# Patient Record
Sex: MALE | Race: OTHER | NOT HISPANIC OR LATINO
[De-identification: names, ages, dates, MRNs, and addresses within clinical notes are randomized per-mention and may not be internally consistent; named-entity substitution may affect disease eponyms.]

---

## 2022-11-29 PROBLEM — Z00.00 ENCOUNTER FOR PREVENTIVE HEALTH EXAMINATION: Status: ACTIVE | Noted: 2022-11-29

## 2022-12-08 PROBLEM — Z87.891 FORMER SMOKER: Status: ACTIVE | Noted: 2022-12-08

## 2022-12-08 PROBLEM — Z78.9 SOCIAL ALCOHOL USE: Status: ACTIVE | Noted: 2022-12-08

## 2022-12-09 ENCOUNTER — APPOINTMENT (OUTPATIENT)
Dept: CARDIOTHORACIC SURGERY | Facility: CLINIC | Age: 72
End: 2022-12-09

## 2022-12-09 VITALS
HEIGHT: 71 IN | BODY MASS INDEX: 34.86 KG/M2 | SYSTOLIC BLOOD PRESSURE: 110 MMHG | TEMPERATURE: 98 F | WEIGHT: 249 LBS | HEART RATE: 81 BPM | DIASTOLIC BLOOD PRESSURE: 63 MMHG | OXYGEN SATURATION: 95 %

## 2022-12-09 DIAGNOSIS — Z83.49 FAMILY HISTORY OF OTHER ENDOCRINE, NUTRITIONAL AND METABOLIC DISEASES: ICD-10-CM

## 2022-12-09 DIAGNOSIS — Z86.79 PERSONAL HISTORY OF OTHER DISEASES OF THE CIRCULATORY SYSTEM: ICD-10-CM

## 2022-12-09 DIAGNOSIS — Z78.9 OTHER SPECIFIED HEALTH STATUS: ICD-10-CM

## 2022-12-09 DIAGNOSIS — Z82.61 FAMILY HISTORY OF ARTHRITIS: ICD-10-CM

## 2022-12-09 DIAGNOSIS — Z80.9 FAMILY HISTORY OF MALIGNANT NEOPLASM, UNSPECIFIED: ICD-10-CM

## 2022-12-09 DIAGNOSIS — Z83.511 FAMILY HISTORY OF GLAUCOMA: ICD-10-CM

## 2022-12-09 DIAGNOSIS — Z87.891 PERSONAL HISTORY OF NICOTINE DEPENDENCE: ICD-10-CM

## 2022-12-09 PROCEDURE — 99205 OFFICE O/P NEW HI 60 MIN: CPT

## 2022-12-12 PROBLEM — Z86.79 HISTORY OF HYPERTENSION: Status: RESOLVED | Noted: 2022-12-12 | Resolved: 2022-12-12

## 2022-12-12 PROBLEM — Z80.9 FAMILY HISTORY OF MALIGNANT NEOPLASM: Status: ACTIVE | Noted: 2022-12-12

## 2022-12-12 PROBLEM — Z82.61 FAMILY HISTORY OF ARTHRITIS: Status: ACTIVE | Noted: 2022-12-12

## 2022-12-12 PROBLEM — Z83.511 FAMILY HISTORY OF GLAUCOMA: Status: ACTIVE | Noted: 2022-12-12

## 2022-12-12 PROBLEM — Z83.49 FAMILY HISTORY OF THYROID DISEASE: Status: ACTIVE | Noted: 2022-12-12

## 2022-12-14 NOTE — HISTORY OF PRESENT ILLNESS
[FreeTextEntry1] : 72 year old male, former smoker ( 25 years 1 ppd, quit 43 years ago) with a past medical history of hypertension, renal calculi, gout, hemochromatosis (managed by Dr.Jeffrey Gomez), atrial fibrillation (on Coumadin), nonischemic cardiomyopathy and severe mitral regurgitation who presents for evaluation and management of his valvular disease. Patient is currently under the care of Dr. Napoleon Corbett. \par \par Patient is doing well and denies recent hospitalization, ER visits, or surgeries. He  denies fever, chills, fatigue, headache, blurred vision, dizziness, syncope, chest pain, palpitations, shortness of breath, orthopnea, paroxysmal nocturnal dyspnea, nausea, vomiting, abdominal pain, back pain, BRBPR or swelling to legs. He is active and typically walks up to 3 miles a day. NYHA II.\par \par Of note he is retired, lives with wife and is independent of all ADLs \par \par

## 2022-12-14 NOTE — END OF VISIT
[FreeTextEntry3] : I, TENNILLE ROBLERO , am scribing for and in the presence of CHUCK GONZALEZ the following sections: History of present illness, past Medical/family/surgical/family/social history, review of systems, vital signs, physical exam and disposition.\par

## 2022-12-14 NOTE — ASSESSMENT
[FreeTextEntry1] : 72 year old male, former smoker ( 25 years 1 ppd, quit 43 years ago) with a past medical history of hypertension, renal calculi, gout, hemochromatosis (managed by Dr.Jeffrey Gomez), atrial fibrillation (on Coumadin), nonischemic cardiomyopathy and severe mitral regurgitation who presents for evaluation and management of his valvular disease. Patient is currently under the care of Dr. Napoleon Corbett. \par \par \par Dr. Atkins reviewed the echocardiogram images with the and discussed the case with Dr. Corbett and Dr. Larkin.. Dr. Larkin discussed the risks, benefits and alternatives to surgery and the various surgical approaches, minimally invasive versus sternotomy. Risks include but not limited to death, heart attack, bleeding, stroke, kidney problems and infection. Dr. Atkins quoted a 2% operative mortality and complication risk. Dr. Atkins feels the patient will benefit from and is a candidate for a Mitral valve repair, possible replacement via sternotomy. CHERI occlusion and CryoMaze Ablation. All questions were addressed and the patient agrees to proceed with surgery. \par  \par Plan \par Mitral valve repair/replacement via sternotomy and CHERI occlusion with atriclip, CyroMaze Ablation \par Stop Coumadin and bridge with Lovenox \par Covid test in [facility] 72 hrs prior to admission\par Admit 1 day before for IV heparin and PST (LABS, UA, EKG, PFTs, chest x-ray PA/LAT) \par Will call patient to plan DOS \par \par

## 2022-12-14 NOTE — DATA REVIEWED
[FreeTextEntry1] : CARLY 11/14/22: \par EF 50-55% \par Severely dilated left atrium \par Severe MR \par Mild TR \par Mitral regurgitation jet is towards the septum \par No evidence of left atrial or CHERI thrombus \par Originates through the posterior leaflet \par Prolapse of the posterior Mitral leaflet \par Systolic flow reversal in the pulmonary veins, which is consistent with severe MR

## 2022-12-14 NOTE — PHYSICAL EXAM
[General Appearance - Alert] : alert [General Appearance - In No Acute Distress] : in no acute distress [Sclera] : the sclera and conjunctiva were normal [PERRL With Normal Accommodation] : pupils were equal in size, round, and reactive to light [Extraocular Movements] : extraocular movements were intact [Outer Ear] : the ears and nose were normal in appearance [Oropharynx] : the oropharynx was normal [Auscultation Breath Sounds / Voice Sounds] : lungs were clear to auscultation bilaterally [Heart Rate And Rhythm] : heart rate was normal and rhythm regular [Heart Sounds] : normal S1 and S2 [Heart Sounds Gallop] : no gallops [Systolic grade ___/6] : A grade [unfilled]/6 systolic murmur was heard. [Examination Of The Chest] : the chest was normal in appearance [2+] : left 2+ [No Abnormalities] : the abdominal aorta was not enlarged and no bruit was heard [Breast Appearance] : normal in appearance [Bowel Sounds] : normal bowel sounds [Abdomen Soft] : soft [Abdomen Tenderness] : non-tender [No CVA Tenderness] : no ~M costovertebral angle tenderness [Abnormal Walk] : normal gait [Nail Clubbing] : no clubbing  or cyanosis of the fingernails [Musculoskeletal - Swelling] : no joint swelling seen [Skin Color & Pigmentation] : normal skin color and pigmentation [Skin Turgor] : normal skin turgor [] : no rash [Cranial Nerves] : cranial nerves 2-12 were intact [Deep Tendon Reflexes (DTR)] : deep tendon reflexes were 2+ and symmetric [Sensation] : the sensory exam was normal to light touch and pinprick [Oriented To Time, Place, And Person] : oriented to person, place, and time [Impaired Insight] : insight and judgment were intact [Affect] : the affect was normal [Varicose Veins Of The Right Leg] : the patient has no varicose veins of the right leg [Varicose Veins Of The Left Leg] : the patient has no varicose veins of the left leg

## 2022-12-19 ENCOUNTER — APPOINTMENT (OUTPATIENT)
Dept: CARDIOTHORACIC SURGERY | Facility: CLINIC | Age: 72
End: 2022-12-19

## 2022-12-19 ENCOUNTER — NON-APPOINTMENT (OUTPATIENT)
Age: 72
End: 2022-12-19

## 2022-12-19 DIAGNOSIS — I34.0 NONRHEUMATIC MITRAL (VALVE) INSUFFICIENCY: ICD-10-CM

## 2022-12-19 PROCEDURE — 99441: CPT

## 2022-12-21 PROBLEM — I34.0 SEVERE MITRAL REGURGITATION: Status: ACTIVE | Noted: 2022-12-08

## 2023-01-03 DIAGNOSIS — Z87.19 PERSONAL HISTORY OF OTHER DISEASES OF THE DIGESTIVE SYSTEM: ICD-10-CM

## 2023-01-06 ENCOUNTER — RESULT REVIEW (OUTPATIENT)
Age: 73
End: 2023-01-06

## 2023-01-09 ENCOUNTER — INPATIENT (INPATIENT)
Facility: HOSPITAL | Age: 73
LOS: 8 days | Discharge: HOME HEALTH | DRG: 216 | End: 2023-01-18
Attending: THORACIC SURGERY (CARDIOTHORACIC VASCULAR SURGERY) | Admitting: THORACIC SURGERY (CARDIOTHORACIC VASCULAR SURGERY)
Payer: MEDICARE

## 2023-01-09 ENCOUNTER — TRANSCRIPTION ENCOUNTER (OUTPATIENT)
Age: 73
End: 2023-01-09

## 2023-01-09 VITALS
SYSTOLIC BLOOD PRESSURE: 118 MMHG | DIASTOLIC BLOOD PRESSURE: 77 MMHG | TEMPERATURE: 97 F | RESPIRATION RATE: 16 BRPM | HEART RATE: 88 BPM | OXYGEN SATURATION: 96 %

## 2023-01-09 DIAGNOSIS — Z90.49 ACQUIRED ABSENCE OF OTHER SPECIFIED PARTS OF DIGESTIVE TRACT: Chronic | ICD-10-CM

## 2023-01-09 LAB
A1C WITH ESTIMATED AVERAGE GLUCOSE RESULT: 5.6 % — SIGNIFICANT CHANGE UP (ref 4–5.6)
ALBUMIN SERPL ELPH-MCNC: 4 G/DL — SIGNIFICANT CHANGE UP (ref 3.3–5)
ALP SERPL-CCNC: 49 U/L — SIGNIFICANT CHANGE UP (ref 40–120)
ALT FLD-CCNC: 34 U/L — SIGNIFICANT CHANGE UP (ref 10–45)
ANION GAP SERPL CALC-SCNC: 8 MMOL/L — SIGNIFICANT CHANGE UP (ref 5–17)
APPEARANCE UR: CLEAR — SIGNIFICANT CHANGE UP
APTT BLD: 33.6 SEC — SIGNIFICANT CHANGE UP (ref 27.5–35.5)
AST SERPL-CCNC: 39 U/L — SIGNIFICANT CHANGE UP (ref 10–40)
BASOPHILS # BLD AUTO: 0.04 K/UL — SIGNIFICANT CHANGE UP (ref 0–0.2)
BASOPHILS NFR BLD AUTO: 0.6 % — SIGNIFICANT CHANGE UP (ref 0–2)
BILIRUB SERPL-MCNC: 1.2 MG/DL — SIGNIFICANT CHANGE UP (ref 0.2–1.2)
BILIRUB UR-MCNC: NEGATIVE — SIGNIFICANT CHANGE UP
BLD GP AB SCN SERPL QL: NEGATIVE — SIGNIFICANT CHANGE UP
BUN SERPL-MCNC: 37 MG/DL — HIGH (ref 7–23)
CALCIUM SERPL-MCNC: 8.9 MG/DL — SIGNIFICANT CHANGE UP (ref 8.4–10.5)
CHLORIDE SERPL-SCNC: 105 MMOL/L — SIGNIFICANT CHANGE UP (ref 96–108)
CHOLEST SERPL-MCNC: 186 MG/DL — SIGNIFICANT CHANGE UP
CO2 SERPL-SCNC: 27 MMOL/L — SIGNIFICANT CHANGE UP (ref 22–31)
COLOR SPEC: YELLOW — SIGNIFICANT CHANGE UP
CREAT SERPL-MCNC: 1.11 MG/DL — SIGNIFICANT CHANGE UP (ref 0.5–1.3)
DIFF PNL FLD: NEGATIVE — SIGNIFICANT CHANGE UP
EGFR: 71 ML/MIN/1.73M2 — SIGNIFICANT CHANGE UP
EOSINOPHIL # BLD AUTO: 0.16 K/UL — SIGNIFICANT CHANGE UP (ref 0–0.5)
EOSINOPHIL NFR BLD AUTO: 2.3 % — SIGNIFICANT CHANGE UP (ref 0–6)
ESTIMATED AVERAGE GLUCOSE: 114 MG/DL — SIGNIFICANT CHANGE UP (ref 68–114)
GLUCOSE SERPL-MCNC: 112 MG/DL — HIGH (ref 70–99)
GLUCOSE UR QL: NEGATIVE — SIGNIFICANT CHANGE UP
HCT VFR BLD CALC: 48.1 % — SIGNIFICANT CHANGE UP (ref 39–50)
HDLC SERPL-MCNC: 44 MG/DL — SIGNIFICANT CHANGE UP
HGB BLD-MCNC: 16.4 G/DL — SIGNIFICANT CHANGE UP (ref 13–17)
IMM GRANULOCYTES NFR BLD AUTO: 0.3 % — SIGNIFICANT CHANGE UP (ref 0–0.9)
INR BLD: 1.14 — SIGNIFICANT CHANGE UP (ref 0.88–1.16)
ISTAT ACTK (ACTIVATED CLOTTING TIME KAOLIN): 245 SEC — HIGH (ref 74–137)
KETONES UR-MCNC: NEGATIVE — SIGNIFICANT CHANGE UP
LEUKOCYTE ESTERASE UR-ACNC: NEGATIVE — SIGNIFICANT CHANGE UP
LIPID PNL WITH DIRECT LDL SERPL: 122 MG/DL — HIGH
LYMPHOCYTES # BLD AUTO: 1.89 K/UL — SIGNIFICANT CHANGE UP (ref 1–3.3)
LYMPHOCYTES # BLD AUTO: 27.8 % — SIGNIFICANT CHANGE UP (ref 13–44)
MCHC RBC-ENTMCNC: 31.1 PG — SIGNIFICANT CHANGE UP (ref 27–34)
MCHC RBC-ENTMCNC: 34.1 GM/DL — SIGNIFICANT CHANGE UP (ref 32–36)
MCV RBC AUTO: 91.1 FL — SIGNIFICANT CHANGE UP (ref 80–100)
MONOCYTES # BLD AUTO: 0.67 K/UL — SIGNIFICANT CHANGE UP (ref 0–0.9)
MONOCYTES NFR BLD AUTO: 9.8 % — SIGNIFICANT CHANGE UP (ref 2–14)
NEUTROPHILS # BLD AUTO: 4.03 K/UL — SIGNIFICANT CHANGE UP (ref 1.8–7.4)
NEUTROPHILS NFR BLD AUTO: 59.2 % — SIGNIFICANT CHANGE UP (ref 43–77)
NITRITE UR-MCNC: NEGATIVE — SIGNIFICANT CHANGE UP
NON HDL CHOLESTEROL: 142 MG/DL — HIGH
NRBC # BLD: 0 /100 WBCS — SIGNIFICANT CHANGE UP (ref 0–0)
NT-PROBNP SERPL-SCNC: 906 PG/ML — HIGH (ref 0–300)
PH UR: 6 — SIGNIFICANT CHANGE UP (ref 5–8)
PLATELET # BLD AUTO: 186 K/UL — SIGNIFICANT CHANGE UP (ref 150–400)
POTASSIUM SERPL-MCNC: 4.5 MMOL/L — SIGNIFICANT CHANGE UP (ref 3.5–5.3)
POTASSIUM SERPL-SCNC: 4.5 MMOL/L — SIGNIFICANT CHANGE UP (ref 3.5–5.3)
PROT SERPL-MCNC: 7.2 G/DL — SIGNIFICANT CHANGE UP (ref 6–8.3)
PROT UR-MCNC: NEGATIVE MG/DL — SIGNIFICANT CHANGE UP
PROTHROM AB SERPL-ACNC: 13.6 SEC — HIGH (ref 10.5–13.4)
RBC # BLD: 5.28 M/UL — SIGNIFICANT CHANGE UP (ref 4.2–5.8)
RBC # FLD: 13.8 % — SIGNIFICANT CHANGE UP (ref 10.3–14.5)
RH IG SCN BLD-IMP: POSITIVE — SIGNIFICANT CHANGE UP
RH IG SCN BLD-IMP: POSITIVE — SIGNIFICANT CHANGE UP
SARS-COV-2 RNA SPEC QL NAA+PROBE: SIGNIFICANT CHANGE UP
SODIUM SERPL-SCNC: 140 MMOL/L — SIGNIFICANT CHANGE UP (ref 135–145)
SP GR SPEC: 1.01 — SIGNIFICANT CHANGE UP (ref 1–1.03)
TRIGL SERPL-MCNC: 98 MG/DL — SIGNIFICANT CHANGE UP
TSH SERPL-MCNC: 2.42 UIU/ML — SIGNIFICANT CHANGE UP (ref 0.27–4.2)
UROBILINOGEN FLD QL: 1 E.U./DL — SIGNIFICANT CHANGE UP
WBC # BLD: 6.81 K/UL — SIGNIFICANT CHANGE UP (ref 3.8–10.5)
WBC # FLD AUTO: 6.81 K/UL — SIGNIFICANT CHANGE UP (ref 3.8–10.5)

## 2023-01-09 PROCEDURE — 99221 1ST HOSP IP/OBS SF/LOW 40: CPT

## 2023-01-09 PROCEDURE — 99152 MOD SED SAME PHYS/QHP 5/>YRS: CPT

## 2023-01-09 PROCEDURE — 93880 EXTRACRANIAL BILAT STUDY: CPT | Mod: 26

## 2023-01-09 PROCEDURE — 94010 BREATHING CAPACITY TEST: CPT | Mod: 26

## 2023-01-09 PROCEDURE — 93454 CORONARY ARTERY ANGIO S&I: CPT | Mod: 26

## 2023-01-09 PROCEDURE — 71046 X-RAY EXAM CHEST 2 VIEWS: CPT | Mod: 26

## 2023-01-09 RX ORDER — HEPARIN SODIUM 5000 [USP'U]/ML
1300 INJECTION INTRAVENOUS; SUBCUTANEOUS
Qty: 25000 | Refills: 0 | Status: DISCONTINUED | OUTPATIENT
Start: 2023-01-09 | End: 2023-01-10

## 2023-01-09 RX ORDER — CHLORHEXIDINE GLUCONATE 213 G/1000ML
1 SOLUTION TOPICAL ONCE
Refills: 0 | Status: DISCONTINUED | OUTPATIENT
Start: 2023-01-09 | End: 2023-01-10

## 2023-01-09 RX ORDER — SODIUM CHLORIDE 9 MG/ML
500 INJECTION INTRAMUSCULAR; INTRAVENOUS; SUBCUTANEOUS
Refills: 0 | Status: DISCONTINUED | OUTPATIENT
Start: 2023-01-09 | End: 2023-01-09

## 2023-01-09 RX ORDER — CHLORHEXIDINE GLUCONATE 213 G/1000ML
1 SOLUTION TOPICAL ONCE
Refills: 0 | Status: COMPLETED | OUTPATIENT
Start: 2023-01-09 | End: 2023-01-09

## 2023-01-09 RX ORDER — CARVEDILOL PHOSPHATE 80 MG/1
25 CAPSULE, EXTENDED RELEASE ORAL EVERY 12 HOURS
Refills: 0 | Status: DISCONTINUED | OUTPATIENT
Start: 2023-01-09 | End: 2023-01-10

## 2023-01-09 RX ORDER — CHLORHEXIDINE GLUCONATE 213 G/1000ML
15 SOLUTION TOPICAL ONCE
Refills: 0 | Status: COMPLETED | OUTPATIENT
Start: 2023-01-09 | End: 2023-01-10

## 2023-01-09 RX ORDER — SODIUM CHLORIDE 9 MG/ML
3 INJECTION INTRAMUSCULAR; INTRAVENOUS; SUBCUTANEOUS ONCE
Refills: 0 | Status: DISCONTINUED | OUTPATIENT
Start: 2023-01-09 | End: 2023-01-10

## 2023-01-09 RX ORDER — POLYETHYLENE GLYCOL 3350 17 G/17G
17 POWDER, FOR SOLUTION ORAL DAILY
Refills: 0 | Status: DISCONTINUED | OUTPATIENT
Start: 2023-01-09 | End: 2023-01-10

## 2023-01-09 RX ORDER — SENNA PLUS 8.6 MG/1
2 TABLET ORAL AT BEDTIME
Refills: 0 | Status: DISCONTINUED | OUTPATIENT
Start: 2023-01-09 | End: 2023-01-10

## 2023-01-09 RX ORDER — PANTOPRAZOLE SODIUM 20 MG/1
40 TABLET, DELAYED RELEASE ORAL
Refills: 0 | Status: DISCONTINUED | OUTPATIENT
Start: 2023-01-09 | End: 2023-01-10

## 2023-01-09 RX ORDER — CHLORHEXIDINE GLUCONATE 213 G/1000ML
1 SOLUTION TOPICAL ONCE
Refills: 0 | Status: COMPLETED | OUTPATIENT
Start: 2023-01-10 | End: 2023-01-10

## 2023-01-09 RX ADMIN — SODIUM CHLORIDE 30 MILLILITER(S): 9 INJECTION INTRAMUSCULAR; INTRAVENOUS; SUBCUTANEOUS at 08:24

## 2023-01-09 RX ADMIN — SENNA PLUS 2 TABLET(S): 8.6 TABLET ORAL at 23:00

## 2023-01-09 RX ADMIN — CARVEDILOL PHOSPHATE 25 MILLIGRAM(S): 80 CAPSULE, EXTENDED RELEASE ORAL at 20:08

## 2023-01-09 RX ADMIN — CHLORHEXIDINE GLUCONATE 1 APPLICATION(S): 213 SOLUTION TOPICAL at 22:59

## 2023-01-09 RX ADMIN — HEPARIN SODIUM 13 UNIT(S)/HR: 5000 INJECTION INTRAVENOUS; SUBCUTANEOUS at 18:55

## 2023-01-09 NOTE — CONSULT NOTE ADULT - SUBJECTIVE AND OBJECTIVE BOX
Surgeon: Dr. Atkins    HISTORY OF PRESENT ILLNESS:  72 year old male, former smoker (1 ppd, quit 43 years ago) with a past medical history of hypertension, renal calculi, gout, hemochromatosis (managed by Dr.Jeffrey Gomez), atrial fibrillation (on Coumadin, last dose 1/3/23), nonischemic cardiomyopathy and severe mitral regurgitation who presents for evaluation and management of his valvular disease. Patient is currently under the care of Dr. Napoleon Corbett. Patient arrived to [facility] for preop cardiac cath 1/9/22 revealing coronaries with mild LI only and known severe MR. Admitted to CTS under Dr. Atkins for presurgical testing for planned MVR, CHERI occlusion, cryomaze ablation with Dr. Atkins and Dr. Larkin tomorrow. Denies fever, chills, fatigue, headache, blurred vision, dizziness, syncope, chest pain, palpitations, shortness of breath, orthopnea, paroxysmal nocturnal dyspnea, nausea, vomiting, abdominal pain, back pain, BRBPR or swelling to legs.       PAST MEDICAL & SURGICAL HISTORY:  Hypertension      Renal calculi      Gout      History of hemochromatosis      Nonischemic cardiomyopathy      Mitral regurgitation      History of cholecystectomy          MEDICATIONS  (STANDING):  chlorhexidine 0.12% Liquid 15 milliLiter(s) Swish and Spit once  chlorhexidine 4% Liquid 1 Application(s) Topical Once  chlorhexidine 4% Liquid 1 Application(s) Topical once  chlorhexidine 4% Liquid 1 Application(s) Topical once  sodium chloride 0.9% lock flush 3 milliLiter(s) IV Push Once    MEDICATIONS  (PRN):      Allergies    No Known Allergies    Intolerances        SOCIAL HISTORY:  Smoker:  former smoker, 1 ppd, quit 43 years ago  ETOH use:  social dirnker  Ilicit Drug use:  No  Occupation: retired  Assisted device use (Cane / Walker): none  Live with: wife    FAMILY HISTORY:  FH: arthritis (Mother)    FH: glaucoma (Sibling)    FH: hypothyroidism (Sibling)        Review of Systems:  CONSTITUTIONAL: Denies fevers / chills, sweats, fatigue, weight loss, weight gain                                       NEURO:  Denies parathesias, seizures, syncope, confusion                                                                                  EYES:  Denies blurry vision, discharge, pain, loss of vision                                                                                    ENMT:  Denies difficulty hearing, vertigo, dysphagia, epistaxis, recent dental work                                       CV:  Denies chest pain, palpitations, ASHFORD, orthopnea                                                                                           RESPIRATORY:  Denies wWheezing, SOB, cough / sputum, hemoptysis                                                               GI:  Denies nausea, vomiting, diarrhea, constipation, melena                                                                          : Denies hematuria, dysuria, urgency, incontinence                                                                                          MUSKULOSKELETAL:  Denies arthritis, joint swelling, muscle weakness                                                             SKIN/BREAST:  Denies rash, itching, hair loss, masses                                                                                              PSYCH:  Denies depression, anxiety, suicidal ideation                                                                                                HEME/LYMPH:  Denies bruises easily, enlarged lymph nodes, tender lymph nodes                                          ENDOCRINE:  Denies cold intolerance, heat intolerance, polydipsia                                                                      Vital Signs Last 24 Hrs  T(C): 36.2 (09 Jan 2023 07:38), Max: 36.2 (09 Jan 2023 07:38)  T(F): 97.2 (09 Jan 2023 07:38), Max: 97.2 (09 Jan 2023 07:38)  HR: 88 (09 Jan 2023 07:38) (88 - 88)  BP: 118/77 (09 Jan 2023 07:38) (118/77 - 118/77)  BP(mean): 90 (09 Jan 2023 07:38) (90 - 90)  RR: 16 (09 Jan 2023 07:38) (16 - 16)  SpO2: 96% (09 Jan 2023 07:38) (96% - 96%)    Parameters below as of 09 Jan 2023 07:38  Patient On (Oxygen Delivery Method): room air        Physical Exam (Need 8)  CONSTITUTIONAL:                                                                          WNL  NEURO:                                                                                             WNL                      EYES:                                                                                                  WNL  ENMT:                                                                                                WNL  CV:                                                                                                      WNL  RESPIRATORY:                                                                                  WNL  GI:                                                                                                       WNL  : CABEZAS + / -                                                                                 WNL  MUSKULOSKELETAL:                                                                       WNL  SKIN / BREAST:                                                                                 WNL                                                          LABS:                        16.4   6.81  )-----------( 186      ( 09 Jan 2023 07:39 )             48.1     01-09    140  |  105  |  37<H>  ----------------------------<  112<H>  4.5   |  27  |  1.11    Ca    8.9      09 Jan 2023 07:37    TPro  7.2  /  Alb  4.0  /  TBili  1.2  /  DBili  x   /  AST  39  /  ALT  34  /  AlkPhos  49  01-09    PT/INR - ( 09 Jan 2023 07:39 )   PT: 13.6 sec;   INR: 1.14          PTT - ( 09 Jan 2023 07:39 )  PTT:33.6 sec            RADIOLOGY & ADDITIONAL STUDIES:  CAROTID U/S:    CXR:    CT Scan:    EKG:    TTE / CARLY:    Cardiac Cath: Surgeon: Dr. Atkins    HISTORY OF PRESENT ILLNESS:  72 year old male, former smoker (1 ppd, quit 43 years ago) with a past medical history of hypertension, renal calculi, gout, hemochromatosis (managed by Dr.Jeffrey Gomez), atrial fibrillation (on Coumadin, last dose 1/3/23), nonischemic cardiomyopathy and severe mitral regurgitation who presents for evaluation and management of his valvular disease. Patient is currently under the care of Dr. Napoleon Corbett. Patient arrived to [facility] for preop cardiac cath 1/9/22 revealing coronaries with mild LI only and known severe MR. Admitted to CTS under Dr. Atkins for presurgical testing for planned MVR, CHERI occlusion, cryomaze ablation with Dr. Atkins and Dr. Larkin tomorrow, 1/10/22. Denies fever, chills, fatigue, headache, blurred vision, dizziness, syncope, chest pain, palpitations, shortness of breath, orthopnea, paroxysmal nocturnal dyspnea, nausea, vomiting, abdominal pain, back pain, BRBPR or swelling to legs.       PAST MEDICAL & SURGICAL HISTORY:  Hypertension      Renal calculi      Gout      History of hemochromatosis      Nonischemic cardiomyopathy      Mitral regurgitation      History of cholecystectomy          MEDICATIONS  (STANDING):  chlorhexidine 0.12% Liquid 15 milliLiter(s) Swish and Spit once  chlorhexidine 4% Liquid 1 Application(s) Topical Once  chlorhexidine 4% Liquid 1 Application(s) Topical once  chlorhexidine 4% Liquid 1 Application(s) Topical once  sodium chloride 0.9% lock flush 3 milliLiter(s) IV Push Once    MEDICATIONS  (PRN):      Allergies    No Known Allergies    Intolerances        SOCIAL HISTORY:  Smoker:  former smoker, 1 ppd, quit 43 years ago  ETOH use:  social dirnker  Ilicit Drug use:  No  Occupation: retired  Assisted device use (Cane / Walker): none  Live with: wife    FAMILY HISTORY:  FH: arthritis (Mother)    FH: glaucoma (Sibling)    FH: hypothyroidism (Sibling)        Review of Systems:  CONSTITUTIONAL: Denies fevers / chills, sweats, fatigue, weight loss, weight gain                                       NEURO:  Denies parathesias, seizures, syncope, confusion                                                                                  EYES:  Denies blurry vision, discharge, pain, loss of vision                                                                                    ENMT:  Denies difficulty hearing, vertigo, dysphagia, epistaxis, recent dental work                                       CV:  Denies chest pain, palpitations, ASHFORD, orthopnea                                                                                           RESPIRATORY:  Denies wWheezing, SOB, cough / sputum, hemoptysis                                                               GI:  Denies nausea, vomiting, diarrhea, constipation, melena                                                                          : Denies hematuria, dysuria, urgency, incontinence                                                                                          MUSKULOSKELETAL:  Denies arthritis, joint swelling, muscle weakness                                                             SKIN/BREAST:  Denies rash, itching, hair loss, masses                                                                                              PSYCH:  Denies depression, anxiety, suicidal ideation                                                                                                HEME/LYMPH:  Denies bruises easily, enlarged lymph nodes, tender lymph nodes                                          ENDOCRINE:  Denies cold intolerance, heat intolerance, polydipsia                                                                      Vital Signs Last 24 Hrs  T(C): 36.2 (09 Jan 2023 07:38), Max: 36.2 (09 Jan 2023 07:38)  T(F): 97.2 (09 Jan 2023 07:38), Max: 97.2 (09 Jan 2023 07:38)  HR: 88 (09 Jan 2023 07:38) (88 - 88)  BP: 118/77 (09 Jan 2023 07:38) (118/77 - 118/77)  BP(mean): 90 (09 Jan 2023 07:38) (90 - 90)  RR: 16 (09 Jan 2023 07:38) (16 - 16)  SpO2: 96% (09 Jan 2023 07:38) (96% - 96%)    Parameters below as of 09 Jan 2023 07:38  Patient On (Oxygen Delivery Method): room air        Physical Exam  General: NAD, sitting comfortably in stretcher, conversing appropriately  Head; NC/AT  Neurological: alert and oriented, UE and LE strength equal b/l, facial symmetry present  Cardiovascular: RRR, Clear S1 and S2, +systolic murmur  Respiratory: chest expansion symmetrical, CTA b/l, no wheezing noted  Gastrointestinal: +BS, soft, NT, ND  Extremities: moving spontaneously, no calf tenderness or edema.  Vascular: warm, well perfused. DP/PT pulses palpable b/l.  Skin: without obvious rashes  Incisions: b/l radial bands in place    LABS:                        16.4   6.81  )-----------( 186      ( 09 Jan 2023 07:39 )             48.1     01-09    140  |  105  |  37<H>  ----------------------------<  112<H>  4.5   |  27  |  1.11    Ca    8.9      09 Jan 2023 07:37    TPro  7.2  /  Alb  4.0  /  TBili  1.2  /  DBili  x   /  AST  39  /  ALT  34  /  AlkPhos  49  01-09    PT/INR - ( 09 Jan 2023 07:39 )   PT: 13.6 sec;   INR: 1.14          PTT - ( 09 Jan 2023 07:39 )  PTT:33.6 sec            RADIOLOGY & ADDITIONAL STUDIES:  CAROTID U/S:  pending    CXR:  pending    CT Scan:  not indicated at this time    EKG:  in chart    TTE / CARLY:  CARLY 11/14/22:  EF 50-55%, severe MVR, Mild TR, prolapse of posterior mitral leaflet    TTE 11/9/22:  EF 55% without wall motion abnormalities, left atrium severely dilated    Cardiac Cath:  CATH 1/09/23: RRA attenpted - tortous/aborted, LLA accessed - has R and L TR bands to be removed at noon by cards PA  coronaries with mild LI only  no LVEDP measured, known severe MR

## 2023-01-09 NOTE — H&P ADULT - ASSESSMENT
71 y/o M (former smoker), with PMHx of HTN,  atrial fibrillation (on coumadin - last dose: __   ), renal calculi, gout, hemochromatosis (managed by Dr. Tenzin Gomez), nonischemic cardiomyopathy (EF: 50-55% as per echo below), and severe mitral regurgitation, who now presents for cardiac catheterization to r/o ischemic disease and will be admitted post procedure to [facility] to undergo surgery  (mitral valve repair/replacement via sternotomy and CHERI occlusion with atrialclip/cryomaze ablation) ,with Dr. Atkins.           H/H    . Pt denies bleeding, GI bleeding, hematemesis, hematuria, BRBPR or melena . Pt to be loaded only with  mg PO x 1 as pt. is pre-op  Cr.    IV NS@ 30 cc/hr X 2  pre-cath only 2/2 severe MR.    ASA 3   Mallampati 3    Risks & benefits of procedure and alternative therapy have been explained to the patient including but not limited to: allergic reaction, bleeding w/possible need for blood transfusion, infection, renal and vascular compromise, limb damage, arrhythmia, stroke, vessel dissection/perforation, Myocardial infarction, emergent CABG. Informed consent obtained and in chart   71 y/o M (former smoker), with PMHx of HTN,  atrial fibrillation (on coumadin - last dose ), renal calculi, gout, hemochromatosis (managed by Dr. Tenzin Gomez), nonischemic cardiomyopathy (EF: 50-55% as per echo below), and severe mitral regurgitation, who now presents for cardiac catheterization to r/o ischemic disease and will be admitted post procedure to [facility] to undergo surgery  (mitral valve repair/replacement via sternotomy and CHERI occlusion with atrialclip/cryomaze ablation) ,with Dr. Atkins.           H/H    . Pt denies bleeding, GI bleeding, hematemesis, hematuria, BRBPR or melena . Pt to be loaded only with  mg PO x 1 as pt. is pre-op  Cr.    IV NS@ 30 cc/hr X 2  pre-cath only 2/2 severe MR.    ASA 3   Mallampati 3    Risks & benefits of procedure and alternative therapy have been explained to the patient including but not limited to: allergic reaction, bleeding w/possible need for blood transfusion, infection, renal and vascular compromise, limb damage, arrhythmia, stroke, vessel dissection/perforation, Myocardial infarction, emergent CABG. Informed consent obtained and in chart

## 2023-01-09 NOTE — PATIENT PROFILE ADULT - FALL HARM RISK - HARM RISK INTERVENTIONS

## 2023-01-09 NOTE — H&P ADULT - HISTORY OF PRESENT ILLNESS
** Patient to be admitted to [facility] post procedure**      Cardiologist: Dr. Atkins / Dr. Corbett (Cards)  Pharmacy:   COVID: 1/6/23 neg (HIE)    coumadin last dose   , lovonex     71 y/o M (former smoker), with PMHx of HTN,  atrial fibrillation (on coumadin - last dose: __   ), renal calculi, gout, hemochromatosis (managed by Dr. Tenzin Gomez), nonischemic cardiomyopathy (EF: 50-55% as per echo below), and severe mitral regurgitation, who presented to Dr. Atkins, for his valvular disease. Patient feels well overall, denies current symptoms. Patient denies CP, SOB, palpitations, orthopnea, LE edema, syncope, n/v, dizziness, diaphoresis, claudication, fever, chills, recent travel, or sick contacts.   ECHO 11/14/2022: Severely dilated LA. Severe MVR. Mild TR. Prolapse of the posterior mitral leaflet. Systolic flow reversal in the pulmonary veins, which is consistent with severe MR. EF: 50-55%.     In light of patient's risk factors, known severe MR, with plan for mitral valve repair/replacement via sternotomy and CHERI occlusion with atrialclip/cryomaze ablation, patient to undergo cardiac catheterization to r/o ischemic disease and will be admitted post procedure to [facility] to undergo surgery with Dr. Atkins.     ** Patient to be admitted to [facility] post procedure**      Cardiologist: Dr. Atkins / Dr. Corbett (Cards)  Pharmacy: [facility] [id number]  COVID: 1/6/23 neg (HIE)    coumadin last dose 1/3/23   bridged to lovonex 1/6-1/8    73 y/o M (former smoker), with PMHx of HTN,  atrial fibrillation (on coumadin), renal calculi, gout, hemochromatosis (managed by Dr. Tenzin Gomez), nonischemic cardiomyopathy (EF: 50-55% as per echo below), and severe mitral regurgitation, who presented to Dr. Atkins, for his valvular disease. Patient feels well overall, denies current symptoms. Patient denies CP, SOB, palpitations, orthopnea, LE edema, syncope, n/v, dizziness, diaphoresis, claudication, fever, chills, recent travel, or sick contacts.  ECHO 11/14/2022: Severely dilated LA. Severe MVR. Mild TR. Prolapse of the posterior mitral leaflet. Systolic flow reversal in the pulmonary veins, which is consistent with severe MR. EF: 50-55%.     In light of patient's risk factors, known severe MR, with plan for mitral valve repair/replacement via sternotomy and CHERI occlusion with atrialclip/cryomaze ablation, patient to undergo cardiac catheterization to r/o ischemic disease and will be admitted post procedure to [facility] to undergo surgery with Dr. Atkins.     (4) rarely moist

## 2023-01-09 NOTE — H&P ADULT - NSHPSOCIALHISTORY_GEN_ALL_CORE
former smoker; quit 43 years ago; smoked 1 PPD x 25 years  social ETOH user  denies any Illicit drug use

## 2023-01-09 NOTE — H&P ADULT - NSICDXPASTMEDICALHX_GEN_ALL_CORE_FT
PAST MEDICAL HISTORY:  Gout     History of hemochromatosis     Hypertension     Mitral regurgitation     Nonischemic cardiomyopathy     Renal calculi

## 2023-01-09 NOTE — H&P ADULT - NSICDXFAMILYHX_GEN_ALL_CORE_FT
FAMILY HISTORY:  Mother  Still living? Unknown  FH: arthritis, Age at diagnosis: Age Unknown    Sibling  Still living? Unknown  FH: glaucoma, Age at diagnosis: Age Unknown  FH: hypothyroidism, Age at diagnosis: Age Unknown

## 2023-01-09 NOTE — H&P ADULT - CONSTITUTIONAL
DATE:  05/03/2019    CONSULTANT:  Bran Cheatham M.D.    REASON FOR CONSULTATION:  Nephrology is consulted for evaluation of renal  failure.     HISTORY OF PRESENT ILLNESS:  The patient is a 65-year-old woman known to our  Nephrology Service from her last hospital admission.  She was discharged home  on April 1, 2019 after being treated for cardiomyopathy and lower extremity  edema, status post acute myocardial infarction.  Cardiac catheterization was  not performed due to renal failure.  Discharge creatinine measured 3.97 mg/dL.  She has been following up in the congestive Heart failure Clinic at [facility].     Yesterday, she presented to the clinic with complaints of dyspnea on exertion  and poor exercise tolerance.  She was also notably jaundiced.  She was admitted  to the hospital.  Creatinine now measures 2.07 mg/dL.  She remains volume  expanded with shortness of breath at rest.  Nephrology is consulted for further  evaluation.     The patient denies any fever or chills.  She had some nausea.  There were no  complaints of cough.  She denies any urinary complaints.  She has significant  lower extremity edema, which makes it difficult for her to ambulate.     PAST MEDICAL HISTORY:  Significant for renal insufficiency, longstanding  diabetes, and hypertension.  She has peripheral edema that was controlled with  diuretics.  Medical history is also significant for acute myocardial infarction  and ischemic cardiomyopathy.  There is no history of stroke.  She is also  treated for hyperlipidemia.     PAST SURGICAL HISTORY:  Significant for coronary artery bypass 10 years ago.  She also has a history of cardiac catheterization with stent placement.     ALLERGIES:  The patient has no known drug allergies.    MEDICATIONS:  Current medications include the following:  Xanax 0.25 mg p.r.n.,  insulin on a sliding scale, hydralazine 25 mg p.o. b.i.d., Coreg 3.125 mg p.o.  q.12 hours, isosorbide mononitrate 30 mg p.o.  daily, potassium chloride 5 mEq  p.o. daily, Tylenol as needed, Plavix 75 mg p.o. daily, aspirin 81 mg p.o.  daily, atorvastatin 80 mg p.o. daily.     FAMILY HISTORY:  Significant for hypertension, diabetes, and coronary artery  disease.     SOCIAL HISTORY:  The patient is single.  She lives at home with one of her  daughters.  She has a total of 3 children.  She does not smoke cigarettes.  She  quit smoking more than 20 years ago.  She does not use alcohol.  She was born  and raised in [place].  She is currently retired.  She worked in [employer]  before residential.  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     PHYSICAL EXAMINATION:  General:  The patient is a chronically ill-appearing,  jaundiced black woman.  She is alert and oriented to person, place, and time.  Vital Signs:  Blood pressure is 109/72, respiratory rate 16, pulse 67,  temperature 36.3 degrees centigrade.  HEENT:  There were no cranial  abnormalities.  There was no conjunctivitis.  Sclerae were icteric.  JVD was  present.  Oral mucosal membranes were moist and intact.  Trachea was in the  midline without stridor.  Pulmonary:  Examination disclosed bibasilar rales and  decreased breath sounds.  Cardiac:  Examination showed a regular rate and  rhythm with a normal S1, S2.  No extra heart sounds were present.  There was no  pericardial friction rub.  There was a grade 2/6 systolic ejection murmur at  the apex that does not radiate.  Abdomen:  Distended.  Ascites was present.  Bowel sounds were present in all 4 quadrants.  There was no tenderness.  Mild  sacral edema was present.  There was 3+ pitting edema peripherally.  Neurologic:  Cranial nerves II through XII were intact.  She was able to move  all 4 extremities.     LABORATORY DATA:  Sodium is 137, potassium 4.2, chloride 103, CO2 22, ,  creatinine 2.07, magnesium 3.1, phosphorus 5.2, bilirubin total 3.2.  INR is  1.2.  Hemoglobin is 11.4 g/dL.  White blood cell count  3.3 thousand per  microliter, platelet count 279,000.     ASSESSMENT AND PLAN:  The patient is a 65-year-old woman readmitted to the  hospital with anasarca and decreased exercise tolerance.  She also appears  jaundiced on examination.  Workup for her jaundice thus far has included an  ultrasound of the gallbladder showing a suggestion of gallstones, however,  there was no evidence of acute cholecystitis.  There was no ductal dilatation.  Chest x-ray is pending.  She appears volume expanded on physical examination,  and is uncomfortable at rest.     Medical problems in this woman include the followin. Stage 3/4 chronic kidney disease.  2. Anasarca.  3. Ischemic cardiomyopathy.  4. Dyspnea on exertion with poor exercise tolerance.  5. Coronary artery disease, status post coronary artery bypass.  6. Ischemic cardiomyopathy, status post pacemaker and defibrillator placement.  7. History of hypertension.  8. Anasarca.  9. Recent acute myocardial infarction.  10. Chronic anemia.  11. Hyperlipidemia.   The patient will be treated with a furosemide drip at a rate of 40 mg/hour as  well as Milrinone as a means of mobilizing fluid and improving cardiac output.  Her last echocardiogram performed on 2019 showed an ejection fraction  measuring between 10% and 15%.  Right ventricular pressures were also elevated  measuring 81 mmHg.  As stated above, she is significantly volume expanded and  has painless jaundice, which may indicate cardiac cirrhosis.     A Lasix drip will be recommended at a rate of 40 mg/hour.  She will be placed  on a salt and fluid restriction.  Milrinone is recommended at a dosage of 0.125  mcg/kg per minute.  If titration upwards is necessary, she may require transfer  to telemetry.  Intake and output will be followed closely.  Urine electrolytes  have already been requested.  A chest x-ray has been requested at the bedside.  Laboratory studies will be followed daily.  Cardiology consultation  is also  requested.  Urine electrolytes have been collected.  ACE inhibitors,  angiotensin II receptor blockers, and nonsteroidals will be avoided at this  time.  Any further orders will be requested as clinically indicated.  The case  will be discussed with Dr. Novoa.     Dr. Novoa, thank you very kindly for allowing me to continue to participate in  the care of this patient.         DATE AND TIME Bran Cheatham M.D.      J/MEDQ-#[id number]  DD:  05/03/2019 12:08:38      DT:  05/03/2019 15:52:41    cc:           Vania Novoa M.D.        [facility]    MRN#: [id number]  CONSULTATION                  ROOM: [address] [place] [address]  ACCT#: [id number]   normal/well-groomed/no distress

## 2023-01-10 ENCOUNTER — APPOINTMENT (OUTPATIENT)
Dept: CARDIOTHORACIC SURGERY | Facility: HOSPITAL | Age: 73
End: 2023-01-10

## 2023-01-10 ENCOUNTER — TRANSCRIPTION ENCOUNTER (OUTPATIENT)
Age: 73
End: 2023-01-10

## 2023-01-10 ENCOUNTER — RESULT REVIEW (OUTPATIENT)
Age: 73
End: 2023-01-10

## 2023-01-10 LAB
ALBUMIN SERPL ELPH-MCNC: 3.3 G/DL — SIGNIFICANT CHANGE UP (ref 3.3–5)
ALBUMIN SERPL ELPH-MCNC: 3.9 G/DL — SIGNIFICANT CHANGE UP (ref 3.3–5)
ALP SERPL-CCNC: 42 U/L — SIGNIFICANT CHANGE UP (ref 40–120)
ALP SERPL-CCNC: 45 U/L — SIGNIFICANT CHANGE UP (ref 40–120)
ALT FLD-CCNC: 57 U/L — HIGH (ref 10–45)
ALT FLD-CCNC: 63 U/L — HIGH (ref 10–45)
ANION GAP SERPL CALC-SCNC: 10 MMOL/L — SIGNIFICANT CHANGE UP (ref 5–17)
ANION GAP SERPL CALC-SCNC: 13 MMOL/L — SIGNIFICANT CHANGE UP (ref 5–17)
ANION GAP SERPL CALC-SCNC: 9 MMOL/L — SIGNIFICANT CHANGE UP (ref 5–17)
APTT BLD: 25.9 SEC — LOW (ref 27.5–35.5)
APTT BLD: 28.6 SEC — SIGNIFICANT CHANGE UP (ref 27.5–35.5)
APTT BLD: 47 SEC — HIGH (ref 27.5–35.5)
APTT BLD: 56.6 SEC — HIGH (ref 27.5–35.5)
AST SERPL-CCNC: 172 U/L — HIGH (ref 10–40)
AST SERPL-CCNC: 174 U/L — HIGH (ref 10–40)
BASE EXCESS BLDA CALC-SCNC: -1 MMOL/L — SIGNIFICANT CHANGE UP (ref -2–3)
BASE EXCESS BLDA CALC-SCNC: -2 MMOL/L — SIGNIFICANT CHANGE UP (ref -2–3)
BASE EXCESS BLDA CALC-SCNC: -2.5 MMOL/L — LOW (ref -2–3)
BASE EXCESS BLDA CALC-SCNC: 0.1 MMOL/L — SIGNIFICANT CHANGE UP (ref -2–3)
BASE EXCESS BLDA CALC-SCNC: 0.3 MMOL/L — SIGNIFICANT CHANGE UP (ref -2–3)
BASE EXCESS BLDA CALC-SCNC: 1.3 MMOL/L — SIGNIFICANT CHANGE UP (ref -2–3)
BASE EXCESS BLDA CALC-SCNC: 1.9 MMOL/L — SIGNIFICANT CHANGE UP (ref -2–3)
BASE EXCESS BLDA CALC-SCNC: 2.3 MMOL/L — SIGNIFICANT CHANGE UP (ref -2–3)
BASE EXCESS BLDA CALC-SCNC: 2.3 MMOL/L — SIGNIFICANT CHANGE UP (ref -2–3)
BASE EXCESS BLDV CALC-SCNC: -1.8 MMOL/L — SIGNIFICANT CHANGE UP (ref -2–3)
BASE EXCESS BLDV CALC-SCNC: -2.4 MMOL/L — LOW (ref -2–3)
BASE EXCESS BLDV CALC-SCNC: 3 MMOL/L — SIGNIFICANT CHANGE UP (ref -2–3)
BILIRUB SERPL-MCNC: 1.7 MG/DL — HIGH (ref 0.2–1.2)
BILIRUB SERPL-MCNC: 2.4 MG/DL — HIGH (ref 0.2–1.2)
BUN SERPL-MCNC: 22 MG/DL — SIGNIFICANT CHANGE UP (ref 7–23)
BUN SERPL-MCNC: 24 MG/DL — HIGH (ref 7–23)
BUN SERPL-MCNC: 26 MG/DL — HIGH (ref 7–23)
CA-I BLDA-SCNC: 0.88 MMOL/L — LOW (ref 1.15–1.33)
CA-I BLDA-SCNC: 0.95 MMOL/L — LOW (ref 1.15–1.33)
CA-I BLDA-SCNC: 0.96 MMOL/L — LOW (ref 1.15–1.33)
CA-I BLDA-SCNC: 1.01 MMOL/L — LOW (ref 1.15–1.33)
CA-I BLDA-SCNC: 1.14 MMOL/L — LOW (ref 1.15–1.33)
CA-I BLDA-SCNC: 1.19 MMOL/L — SIGNIFICANT CHANGE UP (ref 1.15–1.33)
CA-I BLDA-SCNC: 1.21 MMOL/L — SIGNIFICANT CHANGE UP (ref 1.15–1.33)
CA-I BLDA-SCNC: 1.26 MMOL/L — SIGNIFICANT CHANGE UP (ref 1.15–1.33)
CA-I BLDA-SCNC: 1.29 MMOL/L — SIGNIFICANT CHANGE UP (ref 1.15–1.33)
CA-I SERPL-SCNC: 0.98 MMOL/L — LOW (ref 1.15–1.33)
CA-I SERPL-SCNC: 1.16 MMOL/L — SIGNIFICANT CHANGE UP (ref 1.15–1.33)
CALCIUM SERPL-MCNC: 8.4 MG/DL — SIGNIFICANT CHANGE UP (ref 8.4–10.5)
CALCIUM SERPL-MCNC: 8.7 MG/DL — SIGNIFICANT CHANGE UP (ref 8.4–10.5)
CALCIUM SERPL-MCNC: 8.9 MG/DL — SIGNIFICANT CHANGE UP (ref 8.4–10.5)
CHLORIDE SERPL-SCNC: 104 MMOL/L — SIGNIFICANT CHANGE UP (ref 96–108)
CHLORIDE SERPL-SCNC: 106 MMOL/L — SIGNIFICANT CHANGE UP (ref 96–108)
CHLORIDE SERPL-SCNC: 109 MMOL/L — HIGH (ref 96–108)
CO2 BLDA-SCNC: 24 MMOL/L — SIGNIFICANT CHANGE UP (ref 19–24)
CO2 BLDA-SCNC: 24 MMOL/L — SIGNIFICANT CHANGE UP (ref 19–24)
CO2 BLDA-SCNC: 25 MMOL/L — HIGH (ref 19–24)
CO2 BLDA-SCNC: 27 MMOL/L — HIGH (ref 19–24)
CO2 BLDA-SCNC: 27 MMOL/L — HIGH (ref 19–24)
CO2 BLDA-SCNC: 28 MMOL/L — HIGH (ref 19–24)
CO2 BLDA-SCNC: 29 MMOL/L — HIGH (ref 19–24)
CO2 BLDA-SCNC: 30 MMOL/L — HIGH (ref 19–24)
CO2 BLDA-SCNC: 30 MMOL/L — HIGH (ref 19–24)
CO2 BLDV-SCNC: 25 MMOL/L — SIGNIFICANT CHANGE UP (ref 22–26)
CO2 BLDV-SCNC: 26.1 MMOL/L — HIGH (ref 22–26)
CO2 BLDV-SCNC: 31.9 MMOL/L — HIGH (ref 22–26)
CO2 SERPL-SCNC: 21 MMOL/L — LOW (ref 22–31)
CO2 SERPL-SCNC: 23 MMOL/L — SIGNIFICANT CHANGE UP (ref 22–31)
CO2 SERPL-SCNC: 26 MMOL/L — SIGNIFICANT CHANGE UP (ref 22–31)
COHGB MFR BLDA: 1.2 % — SIGNIFICANT CHANGE UP
COHGB MFR BLDA: 1.4 % — SIGNIFICANT CHANGE UP
COHGB MFR BLDA: 1.5 % — SIGNIFICANT CHANGE UP
COHGB MFR BLDA: 1.7 % — SIGNIFICANT CHANGE UP
COHGB MFR BLDA: 1.9 % — SIGNIFICANT CHANGE UP
COHGB MFR BLDV: 1.7 % — SIGNIFICANT CHANGE UP
CREAT SERPL-MCNC: 0.93 MG/DL — SIGNIFICANT CHANGE UP (ref 0.5–1.3)
CREAT SERPL-MCNC: 1.04 MG/DL — SIGNIFICANT CHANGE UP (ref 0.5–1.3)
CREAT SERPL-MCNC: 1.15 MG/DL — SIGNIFICANT CHANGE UP (ref 0.5–1.3)
EGFR: 68 ML/MIN/1.73M2 — SIGNIFICANT CHANGE UP
EGFR: 76 ML/MIN/1.73M2 — SIGNIFICANT CHANGE UP
EGFR: 87 ML/MIN/1.73M2 — SIGNIFICANT CHANGE UP
GAS PNL BLDA: SIGNIFICANT CHANGE UP
GAS PNL BLDV: 134 MMOL/L — LOW (ref 136–145)
GAS PNL BLDV: 138 MMOL/L — SIGNIFICANT CHANGE UP (ref 136–145)
GAS PNL BLDV: SIGNIFICANT CHANGE UP
GLUCOSE BLDA-MCNC: 104 MG/DL — HIGH (ref 70–99)
GLUCOSE BLDA-MCNC: 105 MG/DL — HIGH (ref 70–99)
GLUCOSE BLDA-MCNC: 107 MG/DL — HIGH (ref 70–99)
GLUCOSE BLDA-MCNC: 118 MG/DL — HIGH (ref 70–99)
GLUCOSE BLDA-MCNC: 152 MG/DL — HIGH (ref 70–99)
GLUCOSE BLDA-MCNC: 160 MG/DL — HIGH (ref 70–99)
GLUCOSE BLDA-MCNC: 263 MG/DL — HIGH (ref 70–99)
GLUCOSE BLDA-MCNC: 91 MG/DL — SIGNIFICANT CHANGE UP (ref 70–99)
GLUCOSE BLDA-MCNC: 98 MG/DL — SIGNIFICANT CHANGE UP (ref 70–99)
GLUCOSE BLDC GLUCOMTR-MCNC: 171 MG/DL — HIGH (ref 70–99)
GLUCOSE BLDC GLUCOMTR-MCNC: 87 MG/DL — SIGNIFICANT CHANGE UP (ref 70–99)
GLUCOSE BLDV-MCNC: 104 MG/DL — HIGH (ref 70–99)
GLUCOSE SERPL-MCNC: 102 MG/DL — HIGH (ref 70–99)
GLUCOSE SERPL-MCNC: 169 MG/DL — HIGH (ref 70–99)
GLUCOSE SERPL-MCNC: 217 MG/DL — HIGH (ref 70–99)
HCO3 BLDA-SCNC: 22 MMOL/L — SIGNIFICANT CHANGE UP (ref 21–28)
HCO3 BLDA-SCNC: 23 MMOL/L — SIGNIFICANT CHANGE UP (ref 21–28)
HCO3 BLDA-SCNC: 24 MMOL/L — SIGNIFICANT CHANGE UP (ref 21–28)
HCO3 BLDA-SCNC: 25 MMOL/L — SIGNIFICANT CHANGE UP (ref 21–28)
HCO3 BLDA-SCNC: 26 MMOL/L — SIGNIFICANT CHANGE UP (ref 21–28)
HCO3 BLDA-SCNC: 27 MMOL/L — SIGNIFICANT CHANGE UP (ref 21–28)
HCO3 BLDA-SCNC: 27 MMOL/L — SIGNIFICANT CHANGE UP (ref 21–28)
HCO3 BLDA-SCNC: 28 MMOL/L — SIGNIFICANT CHANGE UP (ref 21–28)
HCO3 BLDA-SCNC: 29 MMOL/L — HIGH (ref 21–28)
HCO3 BLDV-SCNC: 24 MMOL/L — SIGNIFICANT CHANGE UP (ref 22–29)
HCO3 BLDV-SCNC: 25 MMOL/L — SIGNIFICANT CHANGE UP (ref 22–29)
HCO3 BLDV-SCNC: 30 MMOL/L — HIGH (ref 22–29)
HCT VFR BLD CALC: 41.6 % — SIGNIFICANT CHANGE UP (ref 39–50)
HCT VFR BLD CALC: 43.9 % — SIGNIFICANT CHANGE UP (ref 39–50)
HCT VFR BLD CALC: 49.8 % — SIGNIFICANT CHANGE UP (ref 39–50)
HCV AB S/CO SERPL IA: 0.05 S/CO — SIGNIFICANT CHANGE UP
HCV AB SERPL-IMP: SIGNIFICANT CHANGE UP
HGB BLD CALC-MCNC: 14 G/DL — SIGNIFICANT CHANGE UP (ref 12.6–17.4)
HGB BLD-MCNC: 14.3 G/DL — SIGNIFICANT CHANGE UP (ref 13–17)
HGB BLD-MCNC: 15 G/DL — SIGNIFICANT CHANGE UP (ref 13–17)
HGB BLD-MCNC: 16.5 G/DL — SIGNIFICANT CHANGE UP (ref 13–17)
HGB BLDA-MCNC: 13.1 G/DL — SIGNIFICANT CHANGE UP (ref 12.6–17.4)
HGB BLDA-MCNC: 13.3 G/DL — SIGNIFICANT CHANGE UP (ref 12.6–17.4)
HGB BLDA-MCNC: 13.3 G/DL — SIGNIFICANT CHANGE UP (ref 12.6–17.4)
HGB BLDA-MCNC: 13.4 G/DL — SIGNIFICANT CHANGE UP (ref 12.6–17.4)
HGB BLDA-MCNC: 14.1 G/DL — SIGNIFICANT CHANGE UP (ref 12.6–17.4)
HGB BLDA-MCNC: 14.2 G/DL — SIGNIFICANT CHANGE UP (ref 12.6–17.4)
HGB BLDA-MCNC: 15.5 G/DL — SIGNIFICANT CHANGE UP (ref 12.6–17.4)
HGB BLDA-MCNC: 15.8 G/DL — SIGNIFICANT CHANGE UP (ref 12.6–17.4)
HGB BLDA-MCNC: 15.8 G/DL — SIGNIFICANT CHANGE UP (ref 12.6–17.4)
HOROWITZ INDEX BLDV+IHG-RTO: 80 — SIGNIFICANT CHANGE UP
INR BLD: 1.2 — HIGH (ref 0.88–1.16)
INR BLD: 1.32 — HIGH (ref 0.88–1.16)
INR BLD: 1.41 — HIGH (ref 0.88–1.16)
ISTAT ARTERIAL BE: -3 MMOL/L — LOW (ref -2–3)
ISTAT ARTERIAL GLUCOSE: 157 MG/DL — HIGH (ref 70–99)
ISTAT ARTERIAL HCO3: 23 MMOL/L — SIGNIFICANT CHANGE UP (ref 22–26)
ISTAT ARTERIAL HEMATOCRIT: 42 % — SIGNIFICANT CHANGE UP (ref 39–50)
ISTAT ARTERIAL HEMOGLOBIN: 14.3 G/DL — SIGNIFICANT CHANGE UP (ref 13–17)
ISTAT ARTERIAL IONIZED CALCIUM: 1.15 MMOL/L — SIGNIFICANT CHANGE UP (ref 1.12–1.3)
ISTAT ARTERIAL PCO2: 43 MMHG — SIGNIFICANT CHANGE UP (ref 35–45)
ISTAT ARTERIAL PH: 7.34 — LOW (ref 7.35–7.45)
ISTAT ARTERIAL PO2: 94 MMHG — SIGNIFICANT CHANGE UP (ref 80–105)
ISTAT ARTERIAL POTASSIUM: 3.8 MMOL/L — SIGNIFICANT CHANGE UP (ref 3.5–5.3)
ISTAT ARTERIAL SO2: 97 % — SIGNIFICANT CHANGE UP (ref 95–98)
ISTAT ARTERIAL SODIUM: 144 MMOL/L — SIGNIFICANT CHANGE UP (ref 135–145)
ISTAT ARTERIAL TCO2: 24 MMOL/L — SIGNIFICANT CHANGE UP (ref 22–31)
LACTATE SERPL-SCNC: 1.7 MMOL/L — SIGNIFICANT CHANGE UP (ref 0.5–2)
LACTATE SERPL-SCNC: 2 MMOL/L — SIGNIFICANT CHANGE UP (ref 0.5–2)
MAGNESIUM SERPL-MCNC: 1.9 MG/DL — SIGNIFICANT CHANGE UP (ref 1.6–2.6)
MAGNESIUM SERPL-MCNC: 2.4 MG/DL — SIGNIFICANT CHANGE UP (ref 1.6–2.6)
MAGNESIUM SERPL-MCNC: 2.8 MG/DL — HIGH (ref 1.6–2.6)
MCHC RBC-ENTMCNC: 30.9 PG — SIGNIFICANT CHANGE UP (ref 27–34)
MCHC RBC-ENTMCNC: 30.9 PG — SIGNIFICANT CHANGE UP (ref 27–34)
MCHC RBC-ENTMCNC: 31 PG — SIGNIFICANT CHANGE UP (ref 27–34)
MCHC RBC-ENTMCNC: 33.1 GM/DL — SIGNIFICANT CHANGE UP (ref 32–36)
MCHC RBC-ENTMCNC: 34.2 GM/DL — SIGNIFICANT CHANGE UP (ref 32–36)
MCHC RBC-ENTMCNC: 34.4 GM/DL — SIGNIFICANT CHANGE UP (ref 32–36)
MCV RBC AUTO: 90.2 FL — SIGNIFICANT CHANGE UP (ref 80–100)
MCV RBC AUTO: 90.5 FL — SIGNIFICANT CHANGE UP (ref 80–100)
MCV RBC AUTO: 93.3 FL — SIGNIFICANT CHANGE UP (ref 80–100)
METHGB MFR BLDA: 0.4 % — SIGNIFICANT CHANGE UP
METHGB MFR BLDA: 0.8 % — SIGNIFICANT CHANGE UP
METHGB MFR BLDA: 0.9 % — SIGNIFICANT CHANGE UP
METHGB MFR BLDA: 1 % — SIGNIFICANT CHANGE UP
METHGB MFR BLDA: 1 % — SIGNIFICANT CHANGE UP
METHGB MFR BLDA: 1.1 % — SIGNIFICANT CHANGE UP
METHGB MFR BLDV: 0.6 % — SIGNIFICANT CHANGE UP
NRBC # BLD: 0 /100 WBCS — SIGNIFICANT CHANGE UP (ref 0–0)
NT-PROBNP SERPL-SCNC: 866 PG/ML — HIGH (ref 0–300)
OXYHGB MFR BLDA: 96.3 % — HIGH (ref 90–95)
OXYHGB MFR BLDA: 96.4 % — HIGH (ref 90–95)
OXYHGB MFR BLDA: 96.5 % — HIGH (ref 90–95)
OXYHGB MFR BLDA: 96.5 % — HIGH (ref 90–95)
OXYHGB MFR BLDA: 96.9 % — HIGH (ref 90–95)
OXYHGB MFR BLDA: 96.9 % — HIGH (ref 90–95)
OXYHGB MFR BLDA: 97 % — HIGH (ref 90–95)
OXYHGB MFR BLDA: 97.1 % — HIGH (ref 90–95)
OXYHGB MFR BLDA: 97.2 % — HIGH (ref 90–95)
PCO2 BLDA: 38 MMHG — SIGNIFICANT CHANGE UP (ref 35–48)
PCO2 BLDA: 39 MMHG — SIGNIFICANT CHANGE UP (ref 35–48)
PCO2 BLDA: 40 MMHG — SIGNIFICANT CHANGE UP (ref 35–48)
PCO2 BLDA: 41 MMHG — SIGNIFICANT CHANGE UP (ref 35–48)
PCO2 BLDA: 43 MMHG — SIGNIFICANT CHANGE UP (ref 35–48)
PCO2 BLDA: 45 MMHG — SIGNIFICANT CHANGE UP (ref 35–48)
PCO2 BLDA: 47 MMHG — SIGNIFICANT CHANGE UP (ref 35–48)
PCO2 BLDA: 48 MMHG — SIGNIFICANT CHANGE UP (ref 35–48)
PCO2 BLDA: 51 MMHG — HIGH (ref 35–48)
PCO2 BLDV: 42 MMHG — SIGNIFICANT CHANGE UP (ref 42–55)
PCO2 BLDV: 50 MMHG — SIGNIFICANT CHANGE UP (ref 42–55)
PCO2 BLDV: 56 MMHG — HIGH (ref 42–55)
PH BLDA: 7.36 — SIGNIFICANT CHANGE UP (ref 7.35–7.45)
PH BLDA: 7.37 — SIGNIFICANT CHANGE UP (ref 7.35–7.45)
PH BLDA: 7.38 — SIGNIFICANT CHANGE UP (ref 7.35–7.45)
PH BLDA: 7.38 — SIGNIFICANT CHANGE UP (ref 7.35–7.45)
PH BLDA: 7.4 — SIGNIFICANT CHANGE UP (ref 7.35–7.45)
PH BLDA: 7.42 — SIGNIFICANT CHANGE UP (ref 7.35–7.45)
PH BLDV: 7.3 — LOW (ref 7.32–7.43)
PH BLDV: 7.34 — SIGNIFICANT CHANGE UP (ref 7.32–7.43)
PH BLDV: 7.36 — SIGNIFICANT CHANGE UP (ref 7.32–7.43)
PHOSPHATE SERPL-MCNC: 1.1 MG/DL — LOW (ref 2.5–4.5)
PHOSPHATE SERPL-MCNC: 1.6 MG/DL — LOW (ref 2.5–4.5)
PLATELET # BLD AUTO: 133 K/UL — LOW (ref 150–400)
PLATELET # BLD AUTO: 140 K/UL — LOW (ref 150–400)
PLATELET # BLD AUTO: 172 K/UL — SIGNIFICANT CHANGE UP (ref 150–400)
PO2 BLDA: 162 MMHG — HIGH (ref 83–108)
PO2 BLDA: 181 MMHG — HIGH (ref 83–108)
PO2 BLDA: 253 MMHG — HIGH (ref 83–108)
PO2 BLDA: 255 MMHG — HIGH (ref 83–108)
PO2 BLDA: 326 MMHG — HIGH (ref 83–108)
PO2 BLDA: 373 MMHG — HIGH (ref 83–108)
PO2 BLDA: 406 MMHG — HIGH (ref 83–108)
PO2 BLDA: 455 MMHG — HIGH (ref 83–108)
PO2 BLDA: 483 MMHG — HIGH (ref 83–108)
PO2 BLDV: 44 MMHG — SIGNIFICANT CHANGE UP (ref 25–45)
PO2 BLDV: 45 MMHG — SIGNIFICANT CHANGE UP (ref 25–45)
PO2 BLDV: 54 MMHG — HIGH (ref 25–45)
POTASSIUM BLDA-SCNC: 4.3 MMOL/L — SIGNIFICANT CHANGE UP (ref 3.5–5.1)
POTASSIUM BLDA-SCNC: 4.6 MMOL/L — SIGNIFICANT CHANGE UP (ref 3.5–5.1)
POTASSIUM BLDA-SCNC: 4.8 MMOL/L — SIGNIFICANT CHANGE UP (ref 3.5–5.1)
POTASSIUM BLDA-SCNC: 5.8 MMOL/L — HIGH (ref 3.5–5.1)
POTASSIUM BLDA-SCNC: 5.8 MMOL/L — HIGH (ref 3.5–5.1)
POTASSIUM BLDA-SCNC: 6.3 MMOL/L — CRITICAL HIGH (ref 3.5–5.1)
POTASSIUM BLDA-SCNC: 6.6 MMOL/L — CRITICAL HIGH (ref 3.5–5.1)
POTASSIUM BLDA-SCNC: 6.6 MMOL/L — CRITICAL HIGH (ref 3.5–5.1)
POTASSIUM BLDA-SCNC: 6.8 MMOL/L — CRITICAL HIGH (ref 3.5–5.1)
POTASSIUM BLDV-SCNC: 4 MMOL/L — SIGNIFICANT CHANGE UP (ref 3.5–5.1)
POTASSIUM BLDV-SCNC: 6 MMOL/L — HIGH (ref 3.5–5.1)
POTASSIUM SERPL-MCNC: 4.1 MMOL/L — SIGNIFICANT CHANGE UP (ref 3.5–5.3)
POTASSIUM SERPL-MCNC: 4.1 MMOL/L — SIGNIFICANT CHANGE UP (ref 3.5–5.3)
POTASSIUM SERPL-MCNC: 4.4 MMOL/L — SIGNIFICANT CHANGE UP (ref 3.5–5.3)
POTASSIUM SERPL-SCNC: 4.1 MMOL/L — SIGNIFICANT CHANGE UP (ref 3.5–5.3)
POTASSIUM SERPL-SCNC: 4.1 MMOL/L — SIGNIFICANT CHANGE UP (ref 3.5–5.3)
POTASSIUM SERPL-SCNC: 4.4 MMOL/L — SIGNIFICANT CHANGE UP (ref 3.5–5.3)
PROT SERPL-MCNC: 6 G/DL — SIGNIFICANT CHANGE UP (ref 6–8.3)
PROT SERPL-MCNC: 6.2 G/DL — SIGNIFICANT CHANGE UP (ref 6–8.3)
PROTHROM AB SERPL-ACNC: 14.3 SEC — HIGH (ref 10.5–13.4)
PROTHROM AB SERPL-ACNC: 15.7 SEC — HIGH (ref 10.5–13.4)
PROTHROM AB SERPL-ACNC: 16.8 SEC — HIGH (ref 10.5–13.4)
RBC # BLD: 4.61 M/UL — SIGNIFICANT CHANGE UP (ref 4.2–5.8)
RBC # BLD: 4.85 M/UL — SIGNIFICANT CHANGE UP (ref 4.2–5.8)
RBC # BLD: 5.34 M/UL — SIGNIFICANT CHANGE UP (ref 4.2–5.8)
RBC # FLD: 13.4 % — SIGNIFICANT CHANGE UP (ref 10.3–14.5)
RBC # FLD: 13.5 % — SIGNIFICANT CHANGE UP (ref 10.3–14.5)
RBC # FLD: 13.7 % — SIGNIFICANT CHANGE UP (ref 10.3–14.5)
SAO2 % BLDA: 98.9 % — HIGH (ref 94–98)
SAO2 % BLDA: 98.9 % — HIGH (ref 94–98)
SAO2 % BLDA: 99 % — HIGH (ref 94–98)
SAO2 % BLDA: 99 % — HIGH (ref 94–98)
SAO2 % BLDA: 99.2 % — HIGH (ref 94–98)
SAO2 % BLDA: 99.3 % — HIGH (ref 94–98)
SAO2 % BLDA: 99.4 % — HIGH (ref 94–98)
SAO2 % BLDA: 99.5 % — HIGH (ref 94–98)
SAO2 % BLDA: 99.5 % — HIGH (ref 94–98)
SAO2 % BLDV: 71.2 % — SIGNIFICANT CHANGE UP (ref 67–88)
SAO2 % BLDV: 75 % — SIGNIFICANT CHANGE UP (ref 67–88)
SAO2 % BLDV: 86 % — SIGNIFICANT CHANGE UP (ref 67–88)
SODIUM BLDA-SCNC: 132 MMOL/L — LOW (ref 136–145)
SODIUM BLDA-SCNC: 132 MMOL/L — LOW (ref 136–145)
SODIUM BLDA-SCNC: 133 MMOL/L — LOW (ref 136–145)
SODIUM BLDA-SCNC: 135 MMOL/L — LOW (ref 136–145)
SODIUM BLDA-SCNC: 136 MMOL/L — SIGNIFICANT CHANGE UP (ref 136–145)
SODIUM BLDA-SCNC: 136 MMOL/L — SIGNIFICANT CHANGE UP (ref 136–145)
SODIUM BLDA-SCNC: 137 MMOL/L — SIGNIFICANT CHANGE UP (ref 136–145)
SODIUM BLDA-SCNC: 138 MMOL/L — SIGNIFICANT CHANGE UP (ref 136–145)
SODIUM BLDA-SCNC: 139 MMOL/L — SIGNIFICANT CHANGE UP (ref 136–145)
SODIUM SERPL-SCNC: 139 MMOL/L — SIGNIFICANT CHANGE UP (ref 135–145)
SODIUM SERPL-SCNC: 140 MMOL/L — SIGNIFICANT CHANGE UP (ref 135–145)
SODIUM SERPL-SCNC: 142 MMOL/L — SIGNIFICANT CHANGE UP (ref 135–145)
TSH SERPL-MCNC: 2.6 UIU/ML — SIGNIFICANT CHANGE UP (ref 0.27–4.2)
WBC # BLD: 15.63 K/UL — HIGH (ref 3.8–10.5)
WBC # BLD: 17.24 K/UL — HIGH (ref 3.8–10.5)
WBC # BLD: 6.59 K/UL — SIGNIFICANT CHANGE UP (ref 3.8–10.5)
WBC # FLD AUTO: 15.63 K/UL — HIGH (ref 3.8–10.5)
WBC # FLD AUTO: 17.24 K/UL — HIGH (ref 3.8–10.5)
WBC # FLD AUTO: 6.59 K/UL — SIGNIFICANT CHANGE UP (ref 3.8–10.5)

## 2023-01-10 PROCEDURE — 93010 ELECTROCARDIOGRAM REPORT: CPT

## 2023-01-10 PROCEDURE — 99292 CRITICAL CARE ADDL 30 MIN: CPT

## 2023-01-10 PROCEDURE — 88305 TISSUE EXAM BY PATHOLOGIST: CPT | Mod: 26

## 2023-01-10 PROCEDURE — 71045 X-RAY EXAM CHEST 1 VIEW: CPT | Mod: 26

## 2023-01-10 PROCEDURE — 33426 REPAIR OF MITRAL VALVE: CPT

## 2023-01-10 PROCEDURE — 99291 CRITICAL CARE FIRST HOUR: CPT

## 2023-01-10 PROCEDURE — 33426 REPAIR OF MITRAL VALVE: CPT | Mod: 80

## 2023-01-10 RX ORDER — MILRINONE LACTATE 1 MG/ML
0.25 INJECTION, SOLUTION INTRAVENOUS
Qty: 20 | Refills: 0 | Status: DISCONTINUED | OUTPATIENT
Start: 2023-01-10 | End: 2023-01-11

## 2023-01-10 RX ORDER — ALBUMIN HUMAN 25 %
250 VIAL (ML) INTRAVENOUS
Refills: 0 | Status: COMPLETED | OUTPATIENT
Start: 2023-01-10 | End: 2023-01-11

## 2023-01-10 RX ORDER — ASPIRIN/CALCIUM CARB/MAGNESIUM 324 MG
81 TABLET ORAL DAILY
Refills: 0 | Status: DISCONTINUED | OUTPATIENT
Start: 2023-01-11 | End: 2023-01-18

## 2023-01-10 RX ORDER — CHLORHEXIDINE GLUCONATE 213 G/1000ML
15 SOLUTION TOPICAL EVERY 12 HOURS
Refills: 0 | Status: DISCONTINUED | OUTPATIENT
Start: 2023-01-10 | End: 2023-01-11

## 2023-01-10 RX ORDER — AMIODARONE HYDROCHLORIDE 400 MG/1
150 TABLET ORAL ONCE
Refills: 0 | Status: COMPLETED | OUTPATIENT
Start: 2023-01-10 | End: 2023-01-10

## 2023-01-10 RX ORDER — SODIUM CHLORIDE 9 MG/ML
1000 INJECTION INTRAMUSCULAR; INTRAVENOUS; SUBCUTANEOUS
Refills: 0 | Status: DISCONTINUED | OUTPATIENT
Start: 2023-01-10 | End: 2023-01-18

## 2023-01-10 RX ORDER — CHLORHEXIDINE GLUCONATE 213 G/1000ML
5 SOLUTION TOPICAL
Refills: 0 | Status: DISCONTINUED | OUTPATIENT
Start: 2023-01-10 | End: 2023-01-10

## 2023-01-10 RX ORDER — ACETAMINOPHEN 500 MG
1000 TABLET ORAL ONCE
Refills: 0 | Status: COMPLETED | OUTPATIENT
Start: 2023-01-10 | End: 2023-01-12

## 2023-01-10 RX ORDER — CHLORHEXIDINE GLUCONATE 213 G/1000ML
1 SOLUTION TOPICAL DAILY
Refills: 0 | Status: DISCONTINUED | OUTPATIENT
Start: 2023-01-11 | End: 2023-01-18

## 2023-01-10 RX ORDER — DEXTROSE 50 % IN WATER 50 %
25 SYRINGE (ML) INTRAVENOUS
Refills: 0 | Status: DISCONTINUED | OUTPATIENT
Start: 2023-01-10 | End: 2023-01-11

## 2023-01-10 RX ORDER — ALBUMIN HUMAN 25 %
50 VIAL (ML) INTRAVENOUS
Refills: 0 | Status: COMPLETED | OUTPATIENT
Start: 2023-01-10 | End: 2023-01-10

## 2023-01-10 RX ORDER — CEFAZOLIN SODIUM 1 G
2000 VIAL (EA) INJECTION EVERY 8 HOURS
Refills: 0 | Status: COMPLETED | OUTPATIENT
Start: 2023-01-10 | End: 2023-01-12

## 2023-01-10 RX ORDER — CALCIUM GLUCONATE 100 MG/ML
2 VIAL (ML) INTRAVENOUS ONCE
Refills: 0 | Status: COMPLETED | OUTPATIENT
Start: 2023-01-10 | End: 2023-01-10

## 2023-01-10 RX ORDER — VASOPRESSIN 20 [USP'U]/ML
0.02 INJECTION INTRAVENOUS
Qty: 40 | Refills: 0 | Status: DISCONTINUED | OUTPATIENT
Start: 2023-01-10 | End: 2023-01-12

## 2023-01-10 RX ORDER — PROPOFOL 10 MG/ML
5 INJECTION, EMULSION INTRAVENOUS
Qty: 1000 | Refills: 0 | Status: DISCONTINUED | OUTPATIENT
Start: 2023-01-10 | End: 2023-01-11

## 2023-01-10 RX ORDER — HEPARIN SODIUM 5000 [USP'U]/ML
5000 INJECTION INTRAVENOUS; SUBCUTANEOUS EVERY 8 HOURS
Refills: 0 | Status: DISCONTINUED | OUTPATIENT
Start: 2023-01-10 | End: 2023-01-13

## 2023-01-10 RX ORDER — PANTOPRAZOLE SODIUM 20 MG/1
40 TABLET, DELAYED RELEASE ORAL DAILY
Refills: 0 | Status: DISCONTINUED | OUTPATIENT
Start: 2023-01-10 | End: 2023-01-11

## 2023-01-10 RX ORDER — INSULIN HUMAN 100 [IU]/ML
1 INJECTION, SOLUTION SUBCUTANEOUS
Qty: 50 | Refills: 0 | Status: DISCONTINUED | OUTPATIENT
Start: 2023-01-10 | End: 2023-01-11

## 2023-01-10 RX ORDER — DEXMEDETOMIDINE HYDROCHLORIDE IN 0.9% SODIUM CHLORIDE 4 UG/ML
0.2 INJECTION INTRAVENOUS
Qty: 400 | Refills: 0 | Status: DISCONTINUED | OUTPATIENT
Start: 2023-01-10 | End: 2023-01-15

## 2023-01-10 RX ORDER — FENTANYL CITRATE 50 UG/ML
25 INJECTION INTRAVENOUS
Refills: 0 | Status: DISCONTINUED | OUTPATIENT
Start: 2023-01-10 | End: 2023-01-16

## 2023-01-10 RX ORDER — MAGNESIUM OXIDE 400 MG ORAL TABLET 241.3 MG
800 TABLET ORAL ONCE
Refills: 0 | Status: COMPLETED | OUTPATIENT
Start: 2023-01-10 | End: 2023-01-10

## 2023-01-10 RX ORDER — MEPERIDINE HYDROCHLORIDE 50 MG/ML
25 INJECTION INTRAMUSCULAR; INTRAVENOUS; SUBCUTANEOUS ONCE
Refills: 0 | Status: DISCONTINUED | OUTPATIENT
Start: 2023-01-10 | End: 2023-01-10

## 2023-01-10 RX ORDER — DOBUTAMINE HCL 250MG/20ML
5 VIAL (ML) INTRAVENOUS
Qty: 500 | Refills: 0 | Status: DISCONTINUED | OUTPATIENT
Start: 2023-01-10 | End: 2023-01-16

## 2023-01-10 RX ORDER — NOREPINEPHRINE BITARTRATE/D5W 8 MG/250ML
0.05 PLASTIC BAG, INJECTION (ML) INTRAVENOUS
Qty: 8 | Refills: 0 | Status: DISCONTINUED | OUTPATIENT
Start: 2023-01-10 | End: 2023-01-11

## 2023-01-10 RX ORDER — DEXTROSE 50 % IN WATER 50 %
50 SYRINGE (ML) INTRAVENOUS
Refills: 0 | Status: DISCONTINUED | OUTPATIENT
Start: 2023-01-10 | End: 2023-01-11

## 2023-01-10 RX ADMIN — HEPARIN SODIUM 15 UNIT(S)/HR: 5000 INJECTION INTRAVENOUS; SUBCUTANEOUS at 01:01

## 2023-01-10 RX ADMIN — AMIODARONE HYDROCHLORIDE 133.33 MILLIGRAM(S): 400 TABLET ORAL at 23:54

## 2023-01-10 RX ADMIN — Medication 250 MILLILITER(S): at 23:50

## 2023-01-10 RX ADMIN — CHLORHEXIDINE GLUCONATE 15 MILLILITER(S): 213 SOLUTION TOPICAL at 11:38

## 2023-01-10 RX ADMIN — Medication 200 GRAM(S): at 22:00

## 2023-01-10 RX ADMIN — Medication 50 MILLILITER(S): at 20:19

## 2023-01-10 RX ADMIN — FENTANYL CITRATE 25 MICROGRAM(S): 50 INJECTION INTRAVENOUS at 20:52

## 2023-01-10 RX ADMIN — DEXMEDETOMIDINE HYDROCHLORIDE IN 0.9% SODIUM CHLORIDE 5.6 MICROGRAM(S)/KG/HR: 4 INJECTION INTRAVENOUS at 22:03

## 2023-01-10 RX ADMIN — HEPARIN SODIUM 15 UNIT(S)/HR: 5000 INJECTION INTRAVENOUS; SUBCUTANEOUS at 10:40

## 2023-01-10 RX ADMIN — MAGNESIUM OXIDE 400 MG ORAL TABLET 800 MILLIGRAM(S): 241.3 TABLET ORAL at 10:40

## 2023-01-10 RX ADMIN — MILRINONE LACTATE 8.4 MICROGRAM(S)/KG/MIN: 1 INJECTION, SOLUTION INTRAVENOUS at 22:04

## 2023-01-10 RX ADMIN — CARVEDILOL PHOSPHATE 25 MILLIGRAM(S): 80 CAPSULE, EXTENDED RELEASE ORAL at 05:40

## 2023-01-10 RX ADMIN — VASOPRESSIN 3 UNIT(S)/MIN: 20 INJECTION INTRAVENOUS at 22:05

## 2023-01-10 RX ADMIN — CHLORHEXIDINE GLUCONATE 1 APPLICATION(S): 213 SOLUTION TOPICAL at 05:14

## 2023-01-10 RX ADMIN — Medication 125 MILLILITER(S): at 21:00

## 2023-01-10 RX ADMIN — PANTOPRAZOLE SODIUM 40 MILLIGRAM(S): 20 TABLET, DELAYED RELEASE ORAL at 06:57

## 2023-01-10 RX ADMIN — Medication 50 MILLILITER(S): at 20:36

## 2023-01-10 RX ADMIN — FENTANYL CITRATE 25 MICROGRAM(S): 50 INJECTION INTRAVENOUS at 20:22

## 2023-01-10 RX ADMIN — Medication 125 MILLILITER(S): at 22:00

## 2023-01-10 NOTE — BRIEF OPERATIVE NOTE - NSICDXBRIEFPOSTOP_GEN_ALL_CORE_FT
POST-OP DIAGNOSIS:  Prolapse of mitral valve 10-Stefano-2023 18:44:15  Ban Cooper  Chronic systolic congestive heart failure 10-Stefano-2023 18:45:00  Ban Cooper  A-fib 10-Stefano-2023 18:45:20  Ban Cooper

## 2023-01-10 NOTE — PRE-OP CHECKLIST - PATIENT'S PERSONAL PROPERTY GIVEN TO
security/safe
Abdomen soft, nontender, nondistended, bowel sounds present in all 4 quadrants.
security/safe

## 2023-01-10 NOTE — PRE-OP CHECKLIST - SELECT TESTS ORDERED
87/POCT Blood Glucose 87/CBC/CMP/PT/PTT/INR/Type and Screen/Urinalysis/EKG/CXR/POCT Blood Glucose/COVID-19

## 2023-01-10 NOTE — PROGRESS NOTE ADULT - ASSESSMENT
72 M  former smoker w/PMHx of HTN,  atrial fibrillation on coumadin, gout, hemochromatosis and severe mitral regurgitation admitted  for cardiac catheterization to r/o ischemic disease prior to surgical mgmt of MR via sternotomy with CryoMaze and Mitral clip.  Nonischemic CMP EF ~ 50%    S/p Mitral valve repair/ Maze and CHERI occlusion  EF 25-30 %   1/10  Received intubated and sedated  On Levo/Vaso/  and Mil  Woke up nonfocal     ASSESSMENT/PLAN  Back on sedation w/full vent synchrony  developed Junc tach/SVT with good response to carotid massage/ one amio bolus for frequent ectopies   Lytes optmized/ Pacer wires back up VVI @ 40   Low UOP and borderline cardiac indices--received volume repletion with colloids   HCT in good range no need for pRBC transfusion yet--> will monitor response   Cont  5/ will sl increase milrinone to 0.375 for marginal indices/SVR 1500 and low UOP  LFTs mildly elevated, Lactate normal  Came off Levo/ Vaso req decreasing as well   Blakes with average output 20 cc/hr   On insulin gtt for hyperglycemia with improving acidosis   Ppx: Sq hep/ PPI/ HOB 45     To monitor organ perfusion parameters closely, titrate inotrops accordingly   SAT in am     ATTENDING: I have personally and independently provided 45 Min of critical care services. this excludes time spent on any procedures or teaching.        72 M  former smoker w/PMHx of HTN,  atrial fibrillation on coumadin, gout, hemochromatosis and severe mitral regurgitation admitted  for cardiac catheterization to r/o ischemic disease prior to surgical mgmt of MR via sternotomy with CryoMaze and Mitral clip.  Nonischemic CMP EF ~ 50%    S/p Mitral valve repair/ Maze and CHERI occlusion  EF 25-30 %   1/10  Received intubated and sedated  On Levo/Vaso/  and Mil  Woke up nonfocal     ASSESSMENT/PLAN  Back on sedation w/full vent synchrony/ ETT retracted  developed Junc tach/SVT with good response to carotid massage/ one amio bolus for frequent ectopies   Lytes optmized/ Pacer wires back up VVI @ 40   Low UOP and borderline cardiac indices--received volume repletion with colloids   HCT in good range no need for pRBC transfusion yet--> will monitor response   Cont  5/ will sl increase milrinone to 0.375 for marginal indices/SVR 1500 and low UOP  LFTs mildly elevated, Lactate normal  Came off Levo/ Vaso req decreasing as well   Blakes with average output 20 cc/hr   On insulin gtt for hyperglycemia with improving acidosis   Ppx: Sq hep/ PPI/ HOB 45     To monitor organ perfusion parameters closely, titrate inotrops accordingly   SAT in am     ATTENDING: I have personally and independently provided 45 Min of critical care services. this excludes time spent on any procedures or teaching.        72 M  former smoker w/PMHx of HTN,  atrial fibrillation on coumadin, gout, hemochromatosis and severe mitral regurgitation admitted  for cardiac catheterization to r/o ischemic disease prior to surgical mgmt of MR via sternotomy with CryoMaze and Mitral clip.  Nonischemic CMP EF ~ 50%    S/p Mitral valve repair/ Maze and CHERI occlusion  EF 25-30 %   1/10  Received intubated and sedated  On Levo/Vaso/  and Mil  Woke up nonfocal     ASSESSMENT/PLAN  Back on sedation w/full vent synchrony/ ETT retracted 1 cm  developed Junc tach/SVT with good response to carotid massage/ one amio bolus for frequent ectopies   Lytes optimized Pacer wires back up VVI @ 40   Low UOP and borderline cardiac indices--received volume repletion with colloids   HCT in good range no need for pRBC transfusion yet--> will monitor response   Cont  5/ will sl increase milrinone to 0.375 for marginal indices/SVR 1500 and low UOP  LFTs mildly elevated, Lactate normal  Came off Levo/ Vaso req decreasing as well   Blakes with average output 20 cc/hr   On insulin gtt for hyperglycemia with improving acidosis   Ppx: Sq hep/ PPI/ HOB 45     To monitor organ perfusion parameters closely, titrate inotrops accordingly   SAT in am     ATTENDING: I have personally and independently provided 45 Min of critical care services. this excludes time spent on any procedures or teaching.

## 2023-01-10 NOTE — PROGRESS NOTE ADULT - SUBJECTIVE AND OBJECTIVE BOX
CTICU  CRITICAL  CARE  attending     Hand off received 					   Pertinent clinical, laboratory, radiographic, hemodynamic, echocardiographic, respiratory data, microbiologic data and chart were reviewed and analyzed frequently throughout the course of the day and night  Patient seen and examined with CTS/ SH attending at bedside  Pt is a 72y , Male, HEALTH ISSUES - PROBLEM Dx:      , FAMILY HISTORY:  FH: arthritis (Mother)    FH: glaucoma (Sibling)    FH: hypothyroidism (Sibling)    PAST MEDICAL & SURGICAL HISTORY:  Hypertension      Renal calculi      Gout      History of hemochromatosis      Nonischemic cardiomyopathy      Mitral regurgitation      History of cholecystectomy        Patient is a 72y old  Male who presents with a chief complaint of     14 system review limited by mentation and multiorgan morbidity     Vital signs, hemodynamic and respiratory parameters were reviewed from the bedside nursing flowsheet.  ICU Vital Signs Last 24 Hrs  T(C): 36.1 (10 Stefano 2023 22:50), Max: 36.5 (10 Stefano 2023 08:45)  T(F): 96.9 (10 Stefano 2023 22:50), Max: 97.7 (10 Stefano 2023 08:45)  HR: 73 (10 Stefano 2023 23:00) (73 - 91)  BP: 108/62 (10 Stefano 2023 11:06) (97/55 - 129/79)  BP(mean): 77 (10 Stefano 2023 08:51) (74 - 77)  ABP: 136/77 (10 Stefano 2023 23:00) (94/53 - 147/75)  ABP(mean): 95 (10 Stefano 2023 23:00) (68 - 100)  RR: 18 (10 Stefano 2023 23:00) (12 - 18)  SpO2: 98% (10 Stefano 2023 23:00) (91% - 100%)    O2 Parameters below as of 10 Stefano 2023 23:00  Patient On (Oxygen Delivery Method): ventilator    O2 Concentration (%): 60      Adult Advanced Hemodynamics Last 24 Hrs  CVP(mm Hg): 8 (10 Stefano 2023 23:00) (-5 - 220)  CVP(cm H2O): --  CO: 4.2 (10 Stefano 2023 22:30) (4.2 - 4.3)  CI: 1.8 (10 Stefano 2023 22:30) (1.8 - 1.9)  PA: 30/17 (10 Stefano 2023 23:00) (18/13 - 34/24)  PA(mean): 24 (10 Stefano 2023 23:00) (16 - 28)  PCWP: --  SVR: 1675 (10 Stefano 2023 22:30) (1200 - 1675)  SVRI: 3865 (10 Stefano 2023 22:30) (6483 - 1875)  PVR: --  PVRI: --, ABG - ( 10 Stefano 2023 22:00 )  pH, Arterial: 7.36  pH, Blood: x     /  pCO2: 32    /  pO2: 122   / HCO3: 18    / Base Excess: -6.3  /  SaO2: 99.1              Mode: AC/ CMV (Assist Control/ Continuous Mandatory Ventilation)  RR (machine): 18  TV (machine): 600  FiO2: 60  PEEP: 7  ITime: 1  MAP: 12  PIP: 22    Intake and output was reviewed and the fluid balance was calculated  Daily Height in cm: 180 (10 Stefano 2023 11:06)    Daily   I&O's Summary    09 Jan 2023 07:01  -  10 Stefano 2023 07:00  --------------------------------------------------------  IN: 65 mL / OUT: 0 mL / NET: 65 mL    10 Stefano 2023 07:01  -  10 Stefano 2023 23:31  --------------------------------------------------------  IN: 1192.3 mL / OUT: 685 mL / NET: 507.3 mL        All lines and drain sites were assessed  Glycemic trend was reviewedCAPILLARY BLOOD GLUCOSE      POCT Blood Glucose.: 171 mg/dL (10 Stefano 2023 23:04)    No acute change in focality  Auscultation of the chest reveals equal bs  Abdomen is soft  Extremities are warm and well perfused  Wounds appear clean and unremarkable  Antibiotics are periop    labs  CBC Full  -  ( 10 Stefano 2023 22:38 )  WBC Count : 15.63 K/uL  RBC Count : 4.61 M/uL  Hemoglobin : 14.3 g/dL  Hematocrit : 41.6 %  Platelet Count - Automated : 133 K/uL  Mean Cell Volume : 90.2 fl  Mean Cell Hemoglobin : 31.0 pg  Mean Cell Hemoglobin Concentration : 34.4 gm/dL  Auto Neutrophil # : x  Auto Lymphocyte # : x  Auto Monocyte # : x  Auto Eosinophil # : x  Auto Basophil # : x  Auto Neutrophil % : x  Auto Lymphocyte % : x  Auto Monocyte % : x  Auto Eosinophil % : x  Auto Basophil % : x    01-10    140  |  106  |  24<H>  ----------------------------<  217<H>  4.1   |  21<L>  |  1.15    Ca    8.7      10 Stefano 2023 22:38  Phos  1.1     01-10  Mg     2.4     01-10    TPro  6.2  /  Alb  3.9  /  TBili  2.4<H>  /  DBili  x   /  AST  172<H>  /  ALT  57<H>  /  AlkPhos  42  01-10    PT/INR - ( 10 Stefano 2023 22:38 )   PT: 16.8 sec;   INR: 1.41          PTT - ( 10 Stefano 2023 22:38 )  PTT:25.9 sec  The current medications were reviewed   MEDICATIONS  (STANDING):  acetaminophen   IVPB .. 1000 milliGRAM(s) IV Intermittent once  albumin human  5% IVPB 250 milliLiter(s) IV Intermittent every 1 hour  ceFAZolin   IVPB 2000 milliGRAM(s) IV Intermittent every 8 hours  chlorhexidine 0.12% Liquid 15 milliLiter(s) Oral Mucosa every 12 hours  dexMEDEtomidine Infusion 0.2 MICROgram(s)/kG/Hr (5.6 mL/Hr) IV Continuous <Continuous>  dextrose 50% Injectable 50 milliLiter(s) IV Push every 15 minutes  dextrose 50% Injectable 25 milliLiter(s) IV Push every 15 minutes  DOBUTamine Infusion 5 MICROgram(s)/kG/Min (16.8 mL/Hr) IV Continuous <Continuous>  heparin   Injectable 5000 Unit(s) SubCutaneous every 8 hours  insulin regular Infusion 1 Unit(s)/Hr (1 mL/Hr) IV Continuous <Continuous>  milrinone Infusion 0.25 MICROgram(s)/kG/Min (8.4 mL/Hr) IV Continuous <Continuous>  norepinephrine Infusion 0.05 MICROgram(s)/kG/Min (10.5 mL/Hr) IV Continuous <Continuous>  pantoprazole  Injectable 40 milliGRAM(s) IV Push daily  propofol Infusion 5 MICROgram(s)/kG/Min (3.36 mL/Hr) IV Continuous <Continuous>  sodium chloride 0.9%. 1000 milliLiter(s) (10 mL/Hr) IV Continuous <Continuous>  vasopressin Infusion 0.02 Unit(s)/Min (3 mL/Hr) IV Continuous <Continuous>    MEDICATIONS  (PRN):  fentaNYL    Injectable 25 MICROGram(s) IV Push every 3 hours PRN Severe Pain (7 - 10)       PROBLEM LIST/ ASSESSMENT:  HEALTH ISSUES - PROBLEM Dx:      ,   Patient is a 72y old  Male who presents with a chief complaint of    s/p cardiac surgery                My plan includes :  close hemodynamic, ventilatory and drain monitoring and management per post op routine    Monitor for arrhythmias and monitor parameters for organ perfusion  beta blockade not administered due to hemodynamic instability and bradycardia  monitor neurologic status  Head of the bed should remain elevated to 45 deg .   chest PT and IS will be encouraged  monitor adequacy of oxygenation and ventilation and attempt to wean oxygen  antibiotic regimen will be tailored to the clinical, laboratory and microbiologic data  Nutritional goals will be met using po eventually , ensure adequate caloric intake and montior the same  Stress ulcer and VTE prophylaxis will be achieved    Glycemic control is satisfactory  Electrolytes have been repleted as necessary and wound care has been carried out. Pain control has been achieved.   agressive physical therapy and early mobility and ambulation goals will be met   The family was updated about the course and plan  CRITICAL CARE TIME personally provided by me  in evaluation and management, reassessments, review and interpretation of labs and x-rays, ventilator and hemodynamic management, formulating a plan and coordinating care: ___90____ MIN.  Time does not include procedural time. Time spent was non routine post-operarive caRE and included multiple and repeated evaluations at the bedside  CTICU ATTENDING     					    Dmitry Swan MD

## 2023-01-10 NOTE — BRIEF OPERATIVE NOTE - NSICDXBRIEFPROCEDURE_GEN_ALL_CORE_FT
PROCEDURES:  Repair, mitral valve, with CARLY 10-Stefano-2023 18:37:05 36mm medtronic partial ring Ban Cooper  Maze procedure with cardiopulmonary bypass 10-Stefano-2023 18:38:10  Ban Cooper  Occlusion, left atrial appendage 10-Stefano-2023 18:39:22 atriclip (atricure) Ban Cooper

## 2023-01-10 NOTE — BRIEF OPERATIVE NOTE - COMMENTS
Dr. Larkin was the first assistant for this case including but not limited to sternotomy, mitral valve repair, cryo maze procedure and CHERI occlusion.      I was present for this procedure and participated as first assistant as described by the PA above, unless otherwise noted below.

## 2023-01-10 NOTE — BRIEF OPERATIVE NOTE - NSICDXBRIEFPREOP_GEN_ALL_CORE_FT
PRE-OP DIAGNOSIS:  Mitral valve prolapse 10-Stefano-2023 18:40:25  Ban Cooper  A-fib 10-Stefano-2023 18:40:35  Ban Cooper

## 2023-01-10 NOTE — PROGRESS NOTE ADULT - SUBJECTIVE AND OBJECTIVE BOX
INTERVAL COURSE/ OVERNIGHT EVENTS   S/p MV repair/ Maze procedure with CHERI occlusion  EF 25-30%   1/10  3 L Crystalloids intraop   Received intubated on levo/vaso/  and mil  Woke up nonfocal   Low CT output  Volume repleted for low UOP and borderline cardiac indices/HCT 41%/ Normal lactate, mixed venous sats in good range    ICU Vital Signs Last 24 Hrs  T(C): 36.1 (10 Stefano 2023 22:50), Max: 36.5 (10 Stefano 2023 08:45)  T(F): 96.9 (10 Stefano 2023 22:50), Max: 97.7 (10 Stefano 2023 08:45)  HR: 68 (2023 02:00) (68 - 133)  BP: 108/62 (10 Stefano 2023 11:06) (108/62 - 129/79)  BP(mean): 77 (10 Stefano 2023 08:51) (74 - 77)  ABP: 113/89 (2023 02:00) (94/53 - 147/75)  ABP(mean): 98 (2023 02:00) (68 - 101)  RR: 18 (2023 02:00) (12 - 18)  SpO2: 99% (2023 02:00) (95% - 100%)  O2 Parameters below as of 2023 02:00    Patient On (Oxygen Delivery Method): ventilator  O2 Concentration (%): 60  Mode: AC/ CMV (Assist Control/ Continuous Mandatory Ventilation)  RR (machine): 18  TV (machine): 600  FiO2: 60  PEEP: 7  ITime: 1  MAP: 14  PIP: 23    Adult Advanced Hemodynamics Last 24 Hrs  CVP(mm Hg): 10 (2023 02:00) (-5 - 220)  CO: 4.2 (2023 01:30) (4.2 - 4.4)  CI: 1.8 (2023 01:30) (1.8 - 1.9)  PA: 30/17 (2023 02:00) (18/13 - 34/24)  PA(mean): 22 (2023 02:00) (16 - 28)  SVR: 1579 (2023 01:30) (1199 - 1675)  SVRI: 3645 (2023 01:30) (6896 - 6587)  ABG - ( 2023 02:02 )  pH, Arterial: 7.41  pH, Blood: x     /  pCO2: 30    /  pO2: 119   / HCO3: 19    / Base Excess: -4.5  /  SaO2: 99.3      I&O's Summary  10 Stefano 2023 07:01  -  2023 02:41  --------------------------------------------------------  IN: 2210.2 mL / OUT: 935 mL / NET: 1275.2 mL    PHYSICAL EXAM  General: sedated and intubated/ full vent synchrony   Respiratory: CTA B/L; no wheezes  Cardiovascular: Regular rhythm/rate  Gastrointestinal: Soft; NTND  Extremities: WWP; no edema    MEDICATIONS  (STANDING):  acetaminophen   IVPB .. 1000 milliGRAM(s) IV Intermittent once  albumin human  5% IVPB 250 milliLiter(s) IV Intermittent every 1 hour  aspirin enteric coated 81 milliGRAM(s) Oral daily  ceFAZolin   IVPB 2000 milliGRAM(s) IV Intermittent every 8 hours  chlorhexidine 0.12% Liquid 15 milliLiter(s) Oral Mucosa every 12 hours  chlorhexidine 2% Cloths 1 Application(s) Topical daily  dexMEDEtomidine Infusion 0.2 MICROgram(s)/kG/Hr (5.6 mL/Hr) IV Continuous <Continuous>  dextrose 50% Injectable 50 milliLiter(s) IV Push every 15 minutes  dextrose 50% Injectable 25 milliLiter(s) IV Push every 15 minutes  DOBUTamine Infusion 5 MICROgram(s)/kG/Min (16.8 mL/Hr) IV Continuous <Continuous>  heparin   Injectable 5000 Unit(s) SubCutaneous every 8 hours  insulin regular Infusion 1 Unit(s)/Hr (1 mL/Hr) IV Continuous <Continuous>  milrinone Infusion 0.25 MICROgram(s)/kG/Min (8.4 mL/Hr) IV Continuous <Continuous>  norepinephrine Infusion 0.05 MICROgram(s)/kG/Min (10.5 mL/Hr) IV Continuous <Continuous>  pantoprazole  Injectable 40 milliGRAM(s) IV Push daily  propofol Infusion 5 MICROgram(s)/kG/Min (3.36 mL/Hr) IV Continuous <Continuous>  sodium chloride 0.9%. 1000 milliLiter(s) (10 mL/Hr) IV Continuous <Continuous>  vasopressin Infusion 0.02 Unit(s)/Min (3 mL/Hr) IV Continuous <Continuous>    MEDICATIONS  (PRN):  fentaNYL    Injectable 25 MICROGram(s) IV Push every 3 hours PRN Severe Pain (7 - 10)      LABS                        14.3   15.63 )-----------( 133      ( 10 Stefano 2023 22:38 )             41.6     01-10    140  |  106  |  24<H>  ----------------------------<  217<H>  4.1   |  21<L>  |  1.15    Ca    8.7      10 Stefano 2023 22:38  Phos  1.1     0110  Mg     2.4     01-10    TPro  6.2  /  Alb  3.9  /  TBili  2.4<H>  /  DBili  x   /  AST  172<H>  /  ALT  57<H>  /  AlkPhos  42  01-10    LIVER FUNCTIONS - ( 10 Stefano 2023 22:38 )  Alb: 3.9 g/dL / Pro: 6.2 g/dL / ALK PHOS: 42 U/L / ALT: 57 U/L / AST: 172 U/L / GGT: x           PT/INR - ( 10 Stefano 2023 22:38 )   PT: 16.8 sec;   INR: 1.41          PTT - ( 10 Stefano 2023 22:38 )  PTT:25.9 sec  Urinalysis Basic - ( 2023 10:07 )    Color: Yellow / Appearance: Clear / S.010 / pH: x  Gluc: x / Ketone: NEGATIVE  / Bili: Negative / Urobili: 1.0 E.U./dL   Blood: x / Protein: NEGATIVE mg/dL / Nitrite: NEGATIVE   Leuk Esterase: NEGATIVE / RBC: x / WBC x   Sq Epi: x / Non Sq Epi: x / Bacteria: x    IMAGING/EKG/ETC  Reviewed.

## 2023-01-11 LAB
ALBUMIN SERPL ELPH-MCNC: 3.6 G/DL — SIGNIFICANT CHANGE UP (ref 3.3–5)
ALBUMIN SERPL ELPH-MCNC: 3.7 G/DL — SIGNIFICANT CHANGE UP (ref 3.3–5)
ALBUMIN SERPL ELPH-MCNC: 3.8 G/DL — SIGNIFICANT CHANGE UP (ref 3.3–5)
ALBUMIN SERPL ELPH-MCNC: 4.1 G/DL — SIGNIFICANT CHANGE UP (ref 3.3–5)
ALBUMIN SERPL ELPH-MCNC: 4.1 G/DL — SIGNIFICANT CHANGE UP (ref 3.3–5)
ALP SERPL-CCNC: 26 U/L — LOW (ref 40–120)
ALP SERPL-CCNC: 28 U/L — LOW (ref 40–120)
ALP SERPL-CCNC: 31 U/L — LOW (ref 40–120)
ALP SERPL-CCNC: 34 U/L — LOW (ref 40–120)
ALP SERPL-CCNC: 34 U/L — LOW (ref 40–120)
ALT FLD-CCNC: 25 U/L — SIGNIFICANT CHANGE UP (ref 10–45)
ALT FLD-CCNC: 32 U/L — SIGNIFICANT CHANGE UP (ref 10–45)
ALT FLD-CCNC: 35 U/L — SIGNIFICANT CHANGE UP (ref 10–45)
ALT FLD-CCNC: 35 U/L — SIGNIFICANT CHANGE UP (ref 10–45)
ALT FLD-CCNC: 47 U/L — HIGH (ref 10–45)
ANION GAP SERPL CALC-SCNC: 11 MMOL/L — SIGNIFICANT CHANGE UP (ref 5–17)
ANION GAP SERPL CALC-SCNC: 7 MMOL/L — SIGNIFICANT CHANGE UP (ref 5–17)
ANION GAP SERPL CALC-SCNC: 8 MMOL/L — SIGNIFICANT CHANGE UP (ref 5–17)
ANION GAP SERPL CALC-SCNC: 9 MMOL/L — SIGNIFICANT CHANGE UP (ref 5–17)
ANION GAP SERPL CALC-SCNC: 9 MMOL/L — SIGNIFICANT CHANGE UP (ref 5–17)
APTT BLD: 25.3 SEC — LOW (ref 27.5–35.5)
APTT BLD: 25.4 SEC — LOW (ref 27.5–35.5)
APTT BLD: 26.1 SEC — LOW (ref 27.5–35.5)
APTT BLD: 28.3 SEC — SIGNIFICANT CHANGE UP (ref 27.5–35.5)
AST SERPL-CCNC: 138 U/L — HIGH (ref 10–40)
AST SERPL-CCNC: 70 U/L — HIGH (ref 10–40)
AST SERPL-CCNC: 86 U/L — HIGH (ref 10–40)
AST SERPL-CCNC: 96 U/L — HIGH (ref 10–40)
AST SERPL-CCNC: 99 U/L — HIGH (ref 10–40)
BASE EXCESS BLDV CALC-SCNC: -1.2 MMOL/L — SIGNIFICANT CHANGE UP (ref -2–3)
BASE EXCESS BLDV CALC-SCNC: 1 MMOL/L — SIGNIFICANT CHANGE UP (ref -2–3)
BASE EXCESS BLDV CALC-SCNC: 1.7 MMOL/L — SIGNIFICANT CHANGE UP (ref -2–3)
BASE EXCESS BLDV CALC-SCNC: 1.7 MMOL/L — SIGNIFICANT CHANGE UP (ref -2–3)
BASE EXCESS BLDV CALC-SCNC: 4.2 MMOL/L — HIGH (ref -2–3)
BILIRUB SERPL-MCNC: 1 MG/DL — SIGNIFICANT CHANGE UP (ref 0.2–1.2)
BILIRUB SERPL-MCNC: 1 MG/DL — SIGNIFICANT CHANGE UP (ref 0.2–1.2)
BILIRUB SERPL-MCNC: 1.1 MG/DL — SIGNIFICANT CHANGE UP (ref 0.2–1.2)
BILIRUB SERPL-MCNC: 1.2 MG/DL — SIGNIFICANT CHANGE UP (ref 0.2–1.2)
BILIRUB SERPL-MCNC: 2 MG/DL — HIGH (ref 0.2–1.2)
BUN SERPL-MCNC: 19 MG/DL — SIGNIFICANT CHANGE UP (ref 7–23)
BUN SERPL-MCNC: 21 MG/DL — SIGNIFICANT CHANGE UP (ref 7–23)
BUN SERPL-MCNC: 21 MG/DL — SIGNIFICANT CHANGE UP (ref 7–23)
BUN SERPL-MCNC: 22 MG/DL — SIGNIFICANT CHANGE UP (ref 7–23)
BUN SERPL-MCNC: 23 MG/DL — SIGNIFICANT CHANGE UP (ref 7–23)
CALCIUM SERPL-MCNC: 7.8 MG/DL — LOW (ref 8.4–10.5)
CALCIUM SERPL-MCNC: 8.4 MG/DL — SIGNIFICANT CHANGE UP (ref 8.4–10.5)
CALCIUM SERPL-MCNC: 8.4 MG/DL — SIGNIFICANT CHANGE UP (ref 8.4–10.5)
CALCIUM SERPL-MCNC: 8.9 MG/DL — SIGNIFICANT CHANGE UP (ref 8.4–10.5)
CALCIUM SERPL-MCNC: 9.7 MG/DL — SIGNIFICANT CHANGE UP (ref 8.4–10.5)
CHLORIDE SERPL-SCNC: 106 MMOL/L — SIGNIFICANT CHANGE UP (ref 96–108)
CHLORIDE SERPL-SCNC: 107 MMOL/L — SIGNIFICANT CHANGE UP (ref 96–108)
CHLORIDE SERPL-SCNC: 107 MMOL/L — SIGNIFICANT CHANGE UP (ref 96–108)
CHLORIDE SERPL-SCNC: 110 MMOL/L — HIGH (ref 96–108)
CHLORIDE SERPL-SCNC: 110 MMOL/L — HIGH (ref 96–108)
CO2 BLDV-SCNC: 24.8 MMOL/L — SIGNIFICANT CHANGE UP (ref 22–26)
CO2 BLDV-SCNC: 27.3 MMOL/L — HIGH (ref 22–26)
CO2 BLDV-SCNC: 27.9 MMOL/L — HIGH (ref 22–26)
CO2 BLDV-SCNC: 27.9 MMOL/L — HIGH (ref 22–26)
CO2 BLDV-SCNC: 30.6 MMOL/L — HIGH (ref 22–26)
CO2 SERPL-SCNC: 22 MMOL/L — SIGNIFICANT CHANGE UP (ref 22–31)
CO2 SERPL-SCNC: 25 MMOL/L — SIGNIFICANT CHANGE UP (ref 22–31)
CO2 SERPL-SCNC: 27 MMOL/L — SIGNIFICANT CHANGE UP (ref 22–31)
CREAT SERPL-MCNC: 0.94 MG/DL — SIGNIFICANT CHANGE UP (ref 0.5–1.3)
CREAT SERPL-MCNC: 1.05 MG/DL — SIGNIFICANT CHANGE UP (ref 0.5–1.3)
CREAT SERPL-MCNC: 1.05 MG/DL — SIGNIFICANT CHANGE UP (ref 0.5–1.3)
CREAT SERPL-MCNC: 1.06 MG/DL — SIGNIFICANT CHANGE UP (ref 0.5–1.3)
CREAT SERPL-MCNC: 1.11 MG/DL — SIGNIFICANT CHANGE UP (ref 0.5–1.3)
EGFR: 71 ML/MIN/1.73M2 — SIGNIFICANT CHANGE UP
EGFR: 75 ML/MIN/1.73M2 — SIGNIFICANT CHANGE UP
EGFR: 86 ML/MIN/1.73M2 — SIGNIFICANT CHANGE UP
GAS PNL BLDA: SIGNIFICANT CHANGE UP
GLUCOSE BLDC GLUCOMTR-MCNC: 102 MG/DL — HIGH (ref 70–99)
GLUCOSE BLDC GLUCOMTR-MCNC: 114 MG/DL — HIGH (ref 70–99)
GLUCOSE BLDC GLUCOMTR-MCNC: 127 MG/DL — HIGH (ref 70–99)
GLUCOSE BLDC GLUCOMTR-MCNC: 133 MG/DL — HIGH (ref 70–99)
GLUCOSE BLDC GLUCOMTR-MCNC: 142 MG/DL — HIGH (ref 70–99)
GLUCOSE BLDC GLUCOMTR-MCNC: 145 MG/DL — HIGH (ref 70–99)
GLUCOSE BLDC GLUCOMTR-MCNC: 146 MG/DL — HIGH (ref 70–99)
GLUCOSE BLDC GLUCOMTR-MCNC: 148 MG/DL — HIGH (ref 70–99)
GLUCOSE BLDC GLUCOMTR-MCNC: 157 MG/DL — HIGH (ref 70–99)
GLUCOSE BLDC GLUCOMTR-MCNC: 159 MG/DL — HIGH (ref 70–99)
GLUCOSE BLDC GLUCOMTR-MCNC: 176 MG/DL — HIGH (ref 70–99)
GLUCOSE BLDC GLUCOMTR-MCNC: 96 MG/DL — SIGNIFICANT CHANGE UP (ref 70–99)
GLUCOSE BLDC GLUCOMTR-MCNC: 99 MG/DL — SIGNIFICANT CHANGE UP (ref 70–99)
GLUCOSE SERPL-MCNC: 106 MG/DL — HIGH (ref 70–99)
GLUCOSE SERPL-MCNC: 137 MG/DL — HIGH (ref 70–99)
GLUCOSE SERPL-MCNC: 149 MG/DL — HIGH (ref 70–99)
GLUCOSE SERPL-MCNC: 150 MG/DL — HIGH (ref 70–99)
GLUCOSE SERPL-MCNC: 193 MG/DL — HIGH (ref 70–99)
HCO3 BLDV-SCNC: 24 MMOL/L — SIGNIFICANT CHANGE UP (ref 22–29)
HCO3 BLDV-SCNC: 26 MMOL/L — SIGNIFICANT CHANGE UP (ref 22–29)
HCO3 BLDV-SCNC: 27 MMOL/L — SIGNIFICANT CHANGE UP (ref 22–29)
HCO3 BLDV-SCNC: 27 MMOL/L — SIGNIFICANT CHANGE UP (ref 22–29)
HCO3 BLDV-SCNC: 29 MMOL/L — SIGNIFICANT CHANGE UP (ref 22–29)
HCT VFR BLD CALC: 31.3 % — LOW (ref 39–50)
HCT VFR BLD CALC: 33.6 % — LOW (ref 39–50)
HCT VFR BLD CALC: 34.8 % — LOW (ref 39–50)
HCT VFR BLD CALC: 35.3 % — LOW (ref 39–50)
HCT VFR BLD CALC: 37.8 % — LOW (ref 39–50)
HGB BLD-MCNC: 10.9 G/DL — LOW (ref 13–17)
HGB BLD-MCNC: 11.5 G/DL — LOW (ref 13–17)
HGB BLD-MCNC: 12.1 G/DL — LOW (ref 13–17)
HGB BLD-MCNC: 12.1 G/DL — LOW (ref 13–17)
HGB BLD-MCNC: 13.1 G/DL — SIGNIFICANT CHANGE UP (ref 13–17)
INR BLD: 1.49 — HIGH (ref 0.88–1.16)
INR BLD: 1.51 — HIGH (ref 0.88–1.16)
INR BLD: 1.51 — HIGH (ref 0.88–1.16)
INR BLD: 1.55 — HIGH (ref 0.88–1.16)
LACTATE SERPL-SCNC: 0.9 MMOL/L — SIGNIFICANT CHANGE UP (ref 0.5–2)
LACTATE SERPL-SCNC: 1 MMOL/L — SIGNIFICANT CHANGE UP (ref 0.5–2)
LACTATE SERPL-SCNC: 1.1 MMOL/L — SIGNIFICANT CHANGE UP (ref 0.5–2)
LACTATE SERPL-SCNC: 1.3 MMOL/L — SIGNIFICANT CHANGE UP (ref 0.5–2)
LACTATE SERPL-SCNC: 1.9 MMOL/L — SIGNIFICANT CHANGE UP (ref 0.5–2)
MAGNESIUM SERPL-MCNC: 1.9 MG/DL — SIGNIFICANT CHANGE UP (ref 1.6–2.6)
MAGNESIUM SERPL-MCNC: 2 MG/DL — SIGNIFICANT CHANGE UP (ref 1.6–2.6)
MAGNESIUM SERPL-MCNC: 2.1 MG/DL — SIGNIFICANT CHANGE UP (ref 1.6–2.6)
MAGNESIUM SERPL-MCNC: 2.2 MG/DL — SIGNIFICANT CHANGE UP (ref 1.6–2.6)
MAGNESIUM SERPL-MCNC: 2.2 MG/DL — SIGNIFICANT CHANGE UP (ref 1.6–2.6)
MCHC RBC-ENTMCNC: 30.8 PG — SIGNIFICANT CHANGE UP (ref 27–34)
MCHC RBC-ENTMCNC: 30.9 PG — SIGNIFICANT CHANGE UP (ref 27–34)
MCHC RBC-ENTMCNC: 31.1 PG — SIGNIFICANT CHANGE UP (ref 27–34)
MCHC RBC-ENTMCNC: 31.3 PG — SIGNIFICANT CHANGE UP (ref 27–34)
MCHC RBC-ENTMCNC: 31.3 PG — SIGNIFICANT CHANGE UP (ref 27–34)
MCHC RBC-ENTMCNC: 34.2 GM/DL — SIGNIFICANT CHANGE UP (ref 32–36)
MCHC RBC-ENTMCNC: 34.3 GM/DL — SIGNIFICANT CHANGE UP (ref 32–36)
MCHC RBC-ENTMCNC: 34.7 GM/DL — SIGNIFICANT CHANGE UP (ref 32–36)
MCHC RBC-ENTMCNC: 34.8 GM/DL — SIGNIFICANT CHANGE UP (ref 32–36)
MCHC RBC-ENTMCNC: 34.8 GM/DL — SIGNIFICANT CHANGE UP (ref 32–36)
MCV RBC AUTO: 89.8 FL — SIGNIFICANT CHANGE UP (ref 80–100)
MCV RBC AUTO: 89.9 FL — SIGNIFICANT CHANGE UP (ref 80–100)
MCV RBC AUTO: 90.1 FL — SIGNIFICANT CHANGE UP (ref 80–100)
MCV RBC AUTO: 90.1 FL — SIGNIFICANT CHANGE UP (ref 80–100)
MCV RBC AUTO: 90.2 FL — SIGNIFICANT CHANGE UP (ref 80–100)
NRBC # BLD: 0 /100 WBCS — SIGNIFICANT CHANGE UP (ref 0–0)
PCO2 BLDV: 39 MMHG — LOW (ref 42–55)
PCO2 BLDV: 42 MMHG — SIGNIFICANT CHANGE UP (ref 42–55)
PCO2 BLDV: 44 MMHG — SIGNIFICANT CHANGE UP (ref 42–55)
PH BLDV: 7.39 — SIGNIFICANT CHANGE UP (ref 7.32–7.43)
PH BLDV: 7.4 — SIGNIFICANT CHANGE UP (ref 7.32–7.43)
PH BLDV: 7.41 — SIGNIFICANT CHANGE UP (ref 7.32–7.43)
PH BLDV: 7.41 — SIGNIFICANT CHANGE UP (ref 7.32–7.43)
PH BLDV: 7.43 — SIGNIFICANT CHANGE UP (ref 7.32–7.43)
PHOSPHATE SERPL-MCNC: 1.9 MG/DL — LOW (ref 2.5–4.5)
PHOSPHATE SERPL-MCNC: 3.4 MG/DL — SIGNIFICANT CHANGE UP (ref 2.5–4.5)
PHOSPHATE SERPL-MCNC: 3.5 MG/DL — SIGNIFICANT CHANGE UP (ref 2.5–4.5)
PHOSPHATE SERPL-MCNC: 3.8 MG/DL — SIGNIFICANT CHANGE UP (ref 2.5–4.5)
PHOSPHATE SERPL-MCNC: 3.9 MG/DL — SIGNIFICANT CHANGE UP (ref 2.5–4.5)
PLATELET # BLD AUTO: 100 K/UL — LOW (ref 150–400)
PLATELET # BLD AUTO: 109 K/UL — LOW (ref 150–400)
PLATELET # BLD AUTO: 109 K/UL — LOW (ref 150–400)
PLATELET # BLD AUTO: 112 K/UL — LOW (ref 150–400)
PLATELET # BLD AUTO: 114 K/UL — LOW (ref 150–400)
PO2 BLDV: 37 MMHG — SIGNIFICANT CHANGE UP (ref 25–45)
PO2 BLDV: 41 MMHG — SIGNIFICANT CHANGE UP (ref 25–45)
PO2 BLDV: 43 MMHG — SIGNIFICANT CHANGE UP (ref 25–45)
POTASSIUM SERPL-MCNC: 3.4 MMOL/L — LOW (ref 3.5–5.3)
POTASSIUM SERPL-MCNC: 3.8 MMOL/L — SIGNIFICANT CHANGE UP (ref 3.5–5.3)
POTASSIUM SERPL-MCNC: 4 MMOL/L — SIGNIFICANT CHANGE UP (ref 3.5–5.3)
POTASSIUM SERPL-MCNC: 4.2 MMOL/L — SIGNIFICANT CHANGE UP (ref 3.5–5.3)
POTASSIUM SERPL-MCNC: 4.4 MMOL/L — SIGNIFICANT CHANGE UP (ref 3.5–5.3)
POTASSIUM SERPL-SCNC: 3.4 MMOL/L — LOW (ref 3.5–5.3)
POTASSIUM SERPL-SCNC: 3.8 MMOL/L — SIGNIFICANT CHANGE UP (ref 3.5–5.3)
POTASSIUM SERPL-SCNC: 4 MMOL/L — SIGNIFICANT CHANGE UP (ref 3.5–5.3)
POTASSIUM SERPL-SCNC: 4.2 MMOL/L — SIGNIFICANT CHANGE UP (ref 3.5–5.3)
POTASSIUM SERPL-SCNC: 4.4 MMOL/L — SIGNIFICANT CHANGE UP (ref 3.5–5.3)
PROT SERPL-MCNC: 5.5 G/DL — LOW (ref 6–8.3)
PROT SERPL-MCNC: 5.8 G/DL — LOW (ref 6–8.3)
PROT SERPL-MCNC: 5.8 G/DL — LOW (ref 6–8.3)
PROT SERPL-MCNC: 6 G/DL — SIGNIFICANT CHANGE UP (ref 6–8.3)
PROT SERPL-MCNC: 6.1 G/DL — SIGNIFICANT CHANGE UP (ref 6–8.3)
PROTHROM AB SERPL-ACNC: 17.8 SEC — HIGH (ref 10.5–13.4)
PROTHROM AB SERPL-ACNC: 18 SEC — HIGH (ref 10.5–13.4)
PROTHROM AB SERPL-ACNC: 18 SEC — HIGH (ref 10.5–13.4)
PROTHROM AB SERPL-ACNC: 18.5 SEC — HIGH (ref 10.5–13.4)
RBC # BLD: 3.48 M/UL — LOW (ref 4.2–5.8)
RBC # BLD: 3.73 M/UL — LOW (ref 4.2–5.8)
RBC # BLD: 3.86 M/UL — LOW (ref 4.2–5.8)
RBC # BLD: 3.92 M/UL — LOW (ref 4.2–5.8)
RBC # BLD: 4.21 M/UL — SIGNIFICANT CHANGE UP (ref 4.2–5.8)
RBC # FLD: 13.4 % — SIGNIFICANT CHANGE UP (ref 10.3–14.5)
RBC # FLD: 13.5 % — SIGNIFICANT CHANGE UP (ref 10.3–14.5)
SAO2 % BLDV: 66.3 % — LOW (ref 67–88)
SAO2 % BLDV: 71.1 % — SIGNIFICANT CHANGE UP (ref 67–88)
SAO2 % BLDV: 71.4 % — SIGNIFICANT CHANGE UP (ref 67–88)
SAO2 % BLDV: 75.2 % — SIGNIFICANT CHANGE UP (ref 67–88)
SAO2 % BLDV: 75.3 % — SIGNIFICANT CHANGE UP (ref 67–88)
SODIUM SERPL-SCNC: 140 MMOL/L — SIGNIFICANT CHANGE UP (ref 135–145)
SODIUM SERPL-SCNC: 141 MMOL/L — SIGNIFICANT CHANGE UP (ref 135–145)
SODIUM SERPL-SCNC: 143 MMOL/L — SIGNIFICANT CHANGE UP (ref 135–145)
SODIUM SERPL-SCNC: 143 MMOL/L — SIGNIFICANT CHANGE UP (ref 135–145)
SODIUM SERPL-SCNC: 145 MMOL/L — SIGNIFICANT CHANGE UP (ref 135–145)
WBC # BLD: 11.86 K/UL — HIGH (ref 3.8–10.5)
WBC # BLD: 11.96 K/UL — HIGH (ref 3.8–10.5)
WBC # BLD: 12.18 K/UL — HIGH (ref 3.8–10.5)
WBC # BLD: 12.7 K/UL — HIGH (ref 3.8–10.5)
WBC # BLD: 13.39 K/UL — HIGH (ref 3.8–10.5)
WBC # FLD AUTO: 11.86 K/UL — HIGH (ref 3.8–10.5)
WBC # FLD AUTO: 11.96 K/UL — HIGH (ref 3.8–10.5)
WBC # FLD AUTO: 12.18 K/UL — HIGH (ref 3.8–10.5)
WBC # FLD AUTO: 12.7 K/UL — HIGH (ref 3.8–10.5)
WBC # FLD AUTO: 13.39 K/UL — HIGH (ref 3.8–10.5)

## 2023-01-11 PROCEDURE — 71045 X-RAY EXAM CHEST 1 VIEW: CPT | Mod: 26

## 2023-01-11 PROCEDURE — 99292 CRITICAL CARE ADDL 30 MIN: CPT

## 2023-01-11 PROCEDURE — 99291 CRITICAL CARE FIRST HOUR: CPT

## 2023-01-11 PROCEDURE — 93308 TTE F-UP OR LMTD: CPT | Mod: 26

## 2023-01-11 PROCEDURE — 36620 INSERTION CATHETER ARTERY: CPT

## 2023-01-11 RX ORDER — DIGOXIN 250 MCG
250 TABLET ORAL ONCE
Refills: 0 | Status: COMPLETED | OUTPATIENT
Start: 2023-01-11 | End: 2023-01-11

## 2023-01-11 RX ORDER — POTASSIUM CHLORIDE 20 MEQ
20 PACKET (EA) ORAL
Refills: 0 | Status: COMPLETED | OUTPATIENT
Start: 2023-01-11 | End: 2023-01-11

## 2023-01-11 RX ORDER — POLYETHYLENE GLYCOL 3350 17 G/17G
17 POWDER, FOR SOLUTION ORAL AT BEDTIME
Refills: 0 | Status: DISCONTINUED | OUTPATIENT
Start: 2023-01-11 | End: 2023-01-18

## 2023-01-11 RX ORDER — DEXMEDETOMIDINE HYDROCHLORIDE IN 0.9% SODIUM CHLORIDE 4 UG/ML
0.1 INJECTION INTRAVENOUS
Qty: 400 | Refills: 0 | Status: DISCONTINUED | OUTPATIENT
Start: 2023-01-11 | End: 2023-01-12

## 2023-01-11 RX ORDER — MAGNESIUM SULFATE 500 MG/ML
2 VIAL (ML) INJECTION ONCE
Refills: 0 | Status: COMPLETED | OUTPATIENT
Start: 2023-01-11 | End: 2023-01-11

## 2023-01-11 RX ORDER — OXYCODONE HYDROCHLORIDE 5 MG/1
10 TABLET ORAL EVERY 6 HOURS
Refills: 0 | Status: DISCONTINUED | OUTPATIENT
Start: 2023-01-11 | End: 2023-01-15

## 2023-01-11 RX ORDER — PANTOPRAZOLE SODIUM 20 MG/1
40 TABLET, DELAYED RELEASE ORAL
Refills: 0 | Status: DISCONTINUED | OUTPATIENT
Start: 2023-01-11 | End: 2023-01-18

## 2023-01-11 RX ORDER — SODIUM CHLORIDE 9 MG/ML
1000 INJECTION, SOLUTION INTRAVENOUS
Refills: 0 | Status: DISCONTINUED | OUTPATIENT
Start: 2023-01-11 | End: 2023-01-16

## 2023-01-11 RX ORDER — VASOPRESSIN 20 [USP'U]/ML
0.03 INJECTION INTRAVENOUS
Qty: 40 | Refills: 0 | Status: DISCONTINUED | OUTPATIENT
Start: 2023-01-11 | End: 2023-01-12

## 2023-01-11 RX ORDER — POTASSIUM PHOSPHATE, MONOBASIC POTASSIUM PHOSPHATE, DIBASIC 236; 224 MG/ML; MG/ML
30 INJECTION, SOLUTION INTRAVENOUS ONCE
Refills: 0 | Status: COMPLETED | OUTPATIENT
Start: 2023-01-11 | End: 2023-01-11

## 2023-01-11 RX ORDER — MILRINONE LACTATE 1 MG/ML
0.38 INJECTION, SOLUTION INTRAVENOUS
Qty: 20 | Refills: 0 | Status: DISCONTINUED | OUTPATIENT
Start: 2023-01-11 | End: 2023-01-16

## 2023-01-11 RX ORDER — POTASSIUM CHLORIDE 20 MEQ
20 PACKET (EA) ORAL
Refills: 0 | Status: COMPLETED | OUTPATIENT
Start: 2023-01-11 | End: 2023-01-12

## 2023-01-11 RX ORDER — ALBUMIN HUMAN 25 %
250 VIAL (ML) INTRAVENOUS
Refills: 0 | Status: COMPLETED | OUTPATIENT
Start: 2023-01-11 | End: 2023-01-10

## 2023-01-11 RX ORDER — INSULIN LISPRO 100/ML
VIAL (ML) SUBCUTANEOUS
Refills: 0 | Status: DISCONTINUED | OUTPATIENT
Start: 2023-01-11 | End: 2023-01-16

## 2023-01-11 RX ORDER — DEXTROSE 50 % IN WATER 50 %
12.5 SYRINGE (ML) INTRAVENOUS ONCE
Refills: 0 | Status: DISCONTINUED | OUTPATIENT
Start: 2023-01-11 | End: 2023-01-16

## 2023-01-11 RX ORDER — ALBUMIN HUMAN 25 %
250 VIAL (ML) INTRAVENOUS ONCE
Refills: 0 | Status: COMPLETED | OUTPATIENT
Start: 2023-01-11 | End: 2023-01-11

## 2023-01-11 RX ORDER — POTASSIUM PHOSPHATE, MONOBASIC POTASSIUM PHOSPHATE, DIBASIC 236; 224 MG/ML; MG/ML
15 INJECTION, SOLUTION INTRAVENOUS ONCE
Refills: 0 | Status: COMPLETED | OUTPATIENT
Start: 2023-01-11 | End: 2023-01-11

## 2023-01-11 RX ORDER — CALCIUM GLUCONATE 100 MG/ML
2 VIAL (ML) INTRAVENOUS ONCE
Refills: 0 | Status: COMPLETED | OUTPATIENT
Start: 2023-01-11 | End: 2023-01-11

## 2023-01-11 RX ORDER — DEXTROSE 50 % IN WATER 50 %
25 SYRINGE (ML) INTRAVENOUS ONCE
Refills: 0 | Status: DISCONTINUED | OUTPATIENT
Start: 2023-01-11 | End: 2023-01-16

## 2023-01-11 RX ORDER — CALCIUM GLUCONATE 100 MG/ML
2 VIAL (ML) INTRAVENOUS ONCE
Refills: 0 | Status: COMPLETED | OUTPATIENT
Start: 2023-01-11 | End: 2023-01-12

## 2023-01-11 RX ORDER — SODIUM BICARBONATE 1 MEQ/ML
50 SYRINGE (ML) INTRAVENOUS ONCE
Refills: 0 | Status: COMPLETED | OUTPATIENT
Start: 2023-01-11 | End: 2023-01-11

## 2023-01-11 RX ORDER — ACETAMINOPHEN 500 MG
650 TABLET ORAL EVERY 6 HOURS
Refills: 0 | Status: DISCONTINUED | OUTPATIENT
Start: 2023-01-11 | End: 2023-01-18

## 2023-01-11 RX ORDER — AMIODARONE HYDROCHLORIDE 400 MG/1
150 TABLET ORAL ONCE
Refills: 0 | Status: COMPLETED | OUTPATIENT
Start: 2023-01-11 | End: 2023-01-11

## 2023-01-11 RX ORDER — FUROSEMIDE 40 MG
20 TABLET ORAL ONCE
Refills: 0 | Status: COMPLETED | OUTPATIENT
Start: 2023-01-11 | End: 2023-01-11

## 2023-01-11 RX ORDER — DEXTROSE 50 % IN WATER 50 %
15 SYRINGE (ML) INTRAVENOUS ONCE
Refills: 0 | Status: DISCONTINUED | OUTPATIENT
Start: 2023-01-11 | End: 2023-01-16

## 2023-01-11 RX ORDER — ALBUMIN HUMAN 25 %
250 VIAL (ML) INTRAVENOUS
Refills: 0 | Status: COMPLETED | OUTPATIENT
Start: 2023-01-11 | End: 2023-01-11

## 2023-01-11 RX ORDER — GLUCAGON INJECTION, SOLUTION 0.5 MG/.1ML
1 INJECTION, SOLUTION SUBCUTANEOUS ONCE
Refills: 0 | Status: DISCONTINUED | OUTPATIENT
Start: 2023-01-11 | End: 2023-01-18

## 2023-01-11 RX ORDER — OXYCODONE HYDROCHLORIDE 5 MG/1
5 TABLET ORAL EVERY 4 HOURS
Refills: 0 | Status: DISCONTINUED | OUTPATIENT
Start: 2023-01-11 | End: 2023-01-15

## 2023-01-11 RX ORDER — MAGNESIUM SULFATE 500 MG/ML
2 VIAL (ML) INJECTION ONCE
Refills: 0 | Status: COMPLETED | OUTPATIENT
Start: 2023-01-11 | End: 2023-01-12

## 2023-01-11 RX ORDER — FUROSEMIDE 40 MG
10 TABLET ORAL ONCE
Refills: 0 | Status: COMPLETED | OUTPATIENT
Start: 2023-01-11 | End: 2023-01-11

## 2023-01-11 RX ADMIN — INSULIN HUMAN 1 UNIT(S)/HR: 100 INJECTION, SOLUTION SUBCUTANEOUS at 00:06

## 2023-01-11 RX ADMIN — AMIODARONE HYDROCHLORIDE 600 MILLIGRAM(S): 400 TABLET ORAL at 11:17

## 2023-01-11 RX ADMIN — Medication 125 MILLILITER(S): at 19:25

## 2023-01-11 RX ADMIN — CHLORHEXIDINE GLUCONATE 1 APPLICATION(S): 213 SOLUTION TOPICAL at 17:30

## 2023-01-11 RX ADMIN — Medication 100 MILLIGRAM(S): at 17:40

## 2023-01-11 RX ADMIN — HEPARIN SODIUM 5000 UNIT(S): 5000 INJECTION INTRAVENOUS; SUBCUTANEOUS at 05:51

## 2023-01-11 RX ADMIN — CHLORHEXIDINE GLUCONATE 15 MILLILITER(S): 213 SOLUTION TOPICAL at 05:51

## 2023-01-11 RX ADMIN — DEXMEDETOMIDINE HYDROCHLORIDE IN 0.9% SODIUM CHLORIDE 5.6 MICROGRAM(S)/KG/HR: 4 INJECTION INTRAVENOUS at 07:08

## 2023-01-11 RX ADMIN — Medication 200 GRAM(S): at 05:51

## 2023-01-11 RX ADMIN — DEXMEDETOMIDINE HYDROCHLORIDE IN 0.9% SODIUM CHLORIDE 5.6 MICROGRAM(S)/KG/HR: 4 INJECTION INTRAVENOUS at 23:00

## 2023-01-11 RX ADMIN — MILRINONE LACTATE 12.6 MICROGRAM(S)/KG/MIN: 1 INJECTION, SOLUTION INTRAVENOUS at 21:38

## 2023-01-11 RX ADMIN — Medication 50 MILLIEQUIVALENT(S): at 10:01

## 2023-01-11 RX ADMIN — Medication 125 MILLILITER(S): at 13:05

## 2023-01-11 RX ADMIN — Medication 200 GRAM(S): at 10:01

## 2023-01-11 RX ADMIN — AMIODARONE HYDROCHLORIDE 133.33 MILLIGRAM(S): 400 TABLET ORAL at 13:06

## 2023-01-11 RX ADMIN — FENTANYL CITRATE 25 MICROGRAM(S): 50 INJECTION INTRAVENOUS at 09:33

## 2023-01-11 RX ADMIN — Medication 100 MILLIGRAM(S): at 10:00

## 2023-01-11 RX ADMIN — Medication 50 MILLIEQUIVALENT(S): at 09:33

## 2023-01-11 RX ADMIN — Medication 50 MILLIEQUIVALENT(S): at 09:01

## 2023-01-11 RX ADMIN — PANTOPRAZOLE SODIUM 40 MILLIGRAM(S): 20 TABLET, DELAYED RELEASE ORAL at 15:16

## 2023-01-11 RX ADMIN — Medication 10 MILLIGRAM(S): at 17:40

## 2023-01-11 RX ADMIN — Medication 20 MILLIGRAM(S): at 06:05

## 2023-01-11 RX ADMIN — Medication 100 MILLIGRAM(S): at 02:49

## 2023-01-11 RX ADMIN — Medication 16.8 MICROGRAM(S)/KG/MIN: at 07:08

## 2023-01-11 RX ADMIN — Medication 25 GRAM(S): at 11:07

## 2023-01-11 RX ADMIN — Medication 125 MILLILITER(S): at 10:03

## 2023-01-11 RX ADMIN — Medication 250 MILLILITER(S): at 01:11

## 2023-01-11 RX ADMIN — HEPARIN SODIUM 5000 UNIT(S): 5000 INJECTION INTRAVENOUS; SUBCUTANEOUS at 15:16

## 2023-01-11 RX ADMIN — Medication 50 MILLIEQUIVALENT(S): at 11:05

## 2023-01-11 RX ADMIN — HEPARIN SODIUM 5000 UNIT(S): 5000 INJECTION INTRAVENOUS; SUBCUTANEOUS at 22:53

## 2023-01-11 RX ADMIN — FENTANYL CITRATE 25 MICROGRAM(S): 50 INJECTION INTRAVENOUS at 01:00

## 2023-01-11 RX ADMIN — Medication 16.8 MICROGRAM(S)/KG/MIN: at 21:39

## 2023-01-11 RX ADMIN — POTASSIUM PHOSPHATE, MONOBASIC POTASSIUM PHOSPHATE, DIBASIC 62.5 MILLIMOLE(S): 236; 224 INJECTION, SOLUTION INTRAVENOUS at 07:09

## 2023-01-11 RX ADMIN — Medication 125 MILLILITER(S): at 09:02

## 2023-01-11 RX ADMIN — AMIODARONE HYDROCHLORIDE 600 MILLIGRAM(S): 400 TABLET ORAL at 09:01

## 2023-01-11 RX ADMIN — Medication 250 MICROGRAM(S): at 13:06

## 2023-01-11 RX ADMIN — Medication 50 MILLIEQUIVALENT(S): at 23:17

## 2023-01-11 RX ADMIN — POTASSIUM PHOSPHATE, MONOBASIC POTASSIUM PHOSPHATE, DIBASIC 83.33 MILLIMOLE(S): 236; 224 INJECTION, SOLUTION INTRAVENOUS at 01:07

## 2023-01-11 NOTE — PROCEDURE NOTE - NSICDXPROCEDURE_GEN_ALL_CORE_FT
Detail Level: Detailed PROCEDURES:  Insertion of arterial line with imaging guidance 11-Jan-2023 06:43:12  Abdi Hong

## 2023-01-11 NOTE — PROGRESS NOTE ADULT - SUBJECTIVE AND OBJECTIVE BOX
CTICU  CRITICAL  CARE  attending     Hand off received 					   Pertinent clinical, laboratory, radiographic, hemodynamic, echocardiographic, respiratory data, microbiologic data and chart were reviewed and analyzed frequently throughout the course of the day and night    72 years old  male (former smoker), with HTN,  atrial fibrillation (on coumadin), renal calculi, gout, hemochromatosis (managed by Dr. Tenzin Gomez), nonischemic cardiomyopathy.  He was evaluated for CHF.  ECHO: Severe mitral regurgitation. Prolapse of the posterior mitral leaflet. Systolic flow reversal in the pulmonary veins. Severely dilated Left atrium.   He presented to Dr. Atkins, for his valvular disease.   S/P Mitral valve repair.  S/P cryo maze  S/P Occlusion of LA appendage.        FAMILY HISTORY:  FH: arthritis (Mother)    FH: glaucoma (Sibling)    FH: hypothyroidism (Sibling)    PAST MEDICAL & SURGICAL HISTORY:  Hypertension  Renal calculi  Gout  History of hemochromatosis  Nonischemic cardiomyopathy  Mitral regurgitation  History of cholecystectomy          14 system review was unremarkable    Vital signs, hemodynamic and respiratory parameters were reviewed from the bedside nursing flow sheet.  ICU Vital Signs Last 24 Hrs  T(C): 37.4 (11 Jan 2023 22:00), Max: 37.4 (11 Jan 2023 22:00)  T(F): 99.3 (11 Jan 2023 22:00), Max: 99.3 (11 Jan 2023 22:00)  HR: 87 (12 Jan 2023 00:33) (68 - 123)  BP: 114/69 (11 Jan 2023 23:00) (114/69 - 138/64)  BP(mean): 87 (11 Jan 2023 23:00) (85 - 101)  ABP: 116/66 (12 Jan 2023 00:00) (75/69 - 154/84)  ABP(mean): 83 (12 Jan 2023 00:00) (65 - 108)  RR: 18 (12 Jan 2023 00:33) (12 - 18)  SpO2: 100% (12 Jan 2023 00:33) (93% - 100%)    O2 Parameters below as of 12 Jan 2023 00:33  Patient On (Oxygen Delivery Method): BiPAP/CPAP    O2 Concentration (%): 50      Adult Advanced Hemodynamics Last 24 Hrs  CVP(mm Hg): 12 (12 Jan 2023 00:00) (3 - 15)  CVP(cm H2O): --  CO: 6.6 (11 Jan 2023 21:00) (4.2 - 6.6)  CI: 2.9 (11 Jan 2023 21:00) (1.8 - 2.9)  PA: 35/19 (12 Jan 2023 00:00) (24/11 - 42/22)  PA(mean): 25 (12 Jan 2023 00:00) (17 - 31)  PCWP: --  SVR: 932 (11 Jan 2023 21:00) (932 - 1632)  SVRI: 2152 (11 Jan 2023 21:00) (2151 - 6206)  PVR: --  PVRI: --, ABG - ( 11 Jan 2023 21:24 )  pH, Arterial: 7.49  pH, Blood: x     /  pCO2: 33    /  pO2: 108   / HCO3: 25    / Base Excess: 2.2   /  SaO2: 98.8              Mode: CPAP with PS  FiO2: 40  PEEP: 5  MAP: 8  PIP: 19    Intake and output was reviewed and the fluid balance was calculated  Daily     Daily   I&O's Summary    10 Stefano 2023 07:01  -  11 Jan 2023 07:00  --------------------------------------------------------  IN: 3193.7 mL / OUT: 1800 mL / NET: 1393.7 mL    11 Jan 2023 07:01  -  12 Jan 2023 00:52  --------------------------------------------------------  IN: 2662.5 mL / OUT: 2805 mL / NET: -142.5 mL        All lines and drain sites were assessed    Neuro: No change in the mental status from the baseline. Follows commands. Moves all 4 extremities.  Neck: No JVD.  CVS: S1, S2, No S3.  Lungs: Good air entry bilaterally.  Abd: Soft. Distended. No tenderness. + Bowel sounds.  Vascular: + DP/PT.  Extremities: No edema.  Lymphatic: Normal.  Skin: No abnormalities.      labs  CBC Full  -  ( 11 Jan 2023 21:24 )  WBC Count : 13.39 K/uL  RBC Count : 3.48 M/uL  Hemoglobin : 10.9 g/dL  Hematocrit : 31.3 %  Platelet Count - Automated : 100 K/uL  Mean Cell Volume : 89.9 fl  Mean Cell Hemoglobin : 31.3 pg  Mean Cell Hemoglobin Concentration : 34.8 gm/dL  Auto Neutrophil # : x  Auto Lymphocyte # : x  Auto Monocyte # : x  Auto Eosinophil # : x  Auto Basophil # : x  Auto Neutrophil % : x  Auto Lymphocyte % : x  Auto Monocyte % : x  Auto Eosinophil % : x  Auto Basophil % : x    01-11    140  |  106  |  19  ----------------------------<  149<H>  3.4<L>   |  27  |  0.94    Ca    7.8<L>      11 Jan 2023 21:24  Phos  3.4     01-11  Mg     1.9     01-11    TPro  5.5<L>  /  Alb  3.6  /  TBili  1.0  /  DBili  x   /  AST  70<H>  /  ALT  25  /  AlkPhos  26<L>  01-11    PT/INR - ( 11 Jan 2023 23:10 )   PT: 19.9 sec;   INR: 1.66          PTT - ( 11 Jan 2023 23:10 )  PTT:28.5 sec  The current medications were reviewed   MEDICATIONS  (STANDING):  acetaminophen   IVPB .. 1000 milliGRAM(s) IV Intermittent once  aspirin enteric coated 81 milliGRAM(s) Oral daily  ceFAZolin   IVPB 2000 milliGRAM(s) IV Intermittent every 8 hours  chlorhexidine 2% Cloths 1 Application(s) Topical daily  dexMEDEtomidine Infusion 0.2 MICROgram(s)/kG/Hr (5.6 mL/Hr) IV Continuous <Continuous>  dexMEDEtomidine Infusion 0.1 MICROgram(s)/kG/Hr (2.8 mL/Hr) IV Continuous <Continuous>  dextrose 5%. 1000 milliLiter(s) (100 mL/Hr) IV Continuous <Continuous>  dextrose 5%. 1000 milliLiter(s) (50 mL/Hr) IV Continuous <Continuous>  dextrose 50% Injectable 25 Gram(s) IV Push once  dextrose 50% Injectable 12.5 Gram(s) IV Push once  dextrose 50% Injectable 25 Gram(s) IV Push once  DOBUTamine Infusion 5 MICROgram(s)/kG/Min (16.8 mL/Hr) IV Continuous <Continuous>  glucagon  Injectable 1 milliGRAM(s) IntraMuscular once  heparin   Injectable 5000 Unit(s) SubCutaneous every 8 hours  insulin lispro (ADMELOG) corrective regimen sliding scale   SubCutaneous Before meals and at bedtime  milrinone Infusion 0.375 MICROgram(s)/kG/Min (12.6 mL/Hr) IV Continuous <Continuous>  pantoprazole    Tablet 40 milliGRAM(s) Oral before breakfast  polyethylene glycol 3350 17 Gram(s) Oral at bedtime  potassium chloride  20 mEq/100 mL IVPB 20 milliEquivalent(s) IV Intermittent every 2 hours  sodium chloride 0.9%. 1000 milliLiter(s) (10 mL/Hr) IV Continuous <Continuous>  vasopressin Infusion 0.02 Unit(s)/Min (3 mL/Hr) IV Continuous <Continuous>  vasopressin Infusion 0.033 Unit(s)/Min (4.95 mL/Hr) IV Continuous <Continuous>    MEDICATIONS  (PRN):  acetaminophen     Tablet .. 650 milliGRAM(s) Oral every 6 hours PRN Mild Pain (1 - 3)  dextrose Oral Gel 15 Gram(s) Oral once PRN Blood Glucose LESS THAN 70 milliGRAM(s)/deciliter  fentaNYL    Injectable 25 MICROGram(s) IV Push every 3 hours PRN Severe Pain (7 - 10)  oxyCODONE    IR 5 milliGRAM(s) Oral every 4 hours PRN Moderate Pain (4 - 6)  oxyCODONE    IR 10 milliGRAM(s) Oral every 6 hours PRN Severe Pain (7 - 10)          72 years old  Male with severe mitral regurgitation in the setting of severely compromised left ventricular function.  S/P mitral valve repair.  Resolving CHF  Hypokalemia.  Hemodynamically stable.  Good oxygenation.  Fair urine out put.      My plan includes :  Bipap overnight.   Continue dobutamine and milrinone infusions.   Statin Rx.  Betablocker and after load reduction after discontinuation of vasopressin.  Heart Failure Rx.  Bronchodilator Rx.  Close hemodynamic, ventilatory and drain monitoring and management  Monitor for arrhythmias and monitor parameters for organ perfusion  Monitor neurologic status  Monitor renal function.  Head of the bed should remain elevated to 45 deg .   Chest PT and IS will be encouraged  Monitor adequacy of oxygenation and ventilation and attempt to wean oxygen  Nutritional goals will be met using po eventually , ensure adequate caloric intake and monitor the same  Stress ulcer and VTE prophylaxis will be achieved    Glycemic control is satisfactory  Electrolytes have been repleted as necessary and wound care has been carried out. Pain control has been achieved.   Aggressive physical therapy and early mobility and ambulation goals will be met   The family was updated about the course and plan  CRITICAL CARE TIME SPENT in evaluation and management, reassessments, review and interpretation of labs and x-rays, ventilator and hemodynamic management, formulating a plan and coordinating care: ___30____ MIN.  Time does not include procedural time.  CTICU ATTENDING     					    Kamari Torres MD

## 2023-01-11 NOTE — PROGRESS NOTE ADULT - SUBJECTIVE AND OBJECTIVE BOX
CTICU  CRITICAL  CARE  attending     Hand off received 					   Pertinent clinical, laboratory, radiographic, hemodynamic, echocardiographic, respiratory data, microbiologic data and chart were reviewed and analyzed frequently throughout the course of the day and night  Patient seen and examined with CTS/ SH attending at bedside  Pt is a 72y , Male, HEALTH ISSUES - PROBLEM Dx:      , FAMILY HISTORY:  FH: arthritis (Mother)    FH: glaucoma (Sibling)    FH: hypothyroidism (Sibling)    PAST MEDICAL & SURGICAL HISTORY:  Hypertension      Renal calculi      Gout      History of hemochromatosis      Nonischemic cardiomyopathy      Mitral regurgitation      History of cholecystectomy        Patient is a 72y old  Male who presents with a chief complaint of     14 system review limited by mentation and multiorgan morbidity     Vital signs, hemodynamic and respiratory parameters were reviewed from the bedside nursing flowsheet.  ICU Vital Signs Last 24 Hrs  T(C): 36.4 (11 Jan 2023 07:00), Max: 36.5 (10 Stefano 2023 08:45)  T(F): 97.5 (11 Jan 2023 07:00), Max: 97.7 (10 Stefano 2023 08:45)  HR: 118 (11 Jan 2023 08:00) (68 - 133)  BP: 117/72 (11 Jan 2023 05:00) (108/62 - 135/80)  BP(mean): 86 (11 Jan 2023 05:00) (77 - 101)  ABP: 119/63 (11 Jan 2023 08:00) (75/69 - 147/75)  ABP(mean): 74 (11 Jan 2023 08:00) (68 - 101)  RR: 16 (11 Jan 2023 08:00) (12 - 18)  SpO2: 100% (11 Jan 2023 08:00) (95% - 100%)    O2 Parameters below as of 11 Jan 2023 08:00  Patient On (Oxygen Delivery Method): ventilator    O2 Concentration (%): 60      Adult Advanced Hemodynamics Last 24 Hrs  CVP(mm Hg): 8 (11 Jan 2023 08:00) (-5 - 220)  CVP(cm H2O): --  CO: 5 (11 Jan 2023 08:00) (4.2 - 5.2)  CI: 2.2 (11 Jan 2023 08:00) (1.8 - 2.3)  PA: 25/15 (11 Jan 2023 08:00) (18/13 - 34/24)  PA(mean): 20 (11 Jan 2023 08:00) (16 - 28)  PCWP: --  SVR: 1087 (11 Jan 2023 08:00) (984 - 1675)  SVRI: 2509 (11 Jan 2023 08:00) (6157 - 3865)  PVR: --  PVRI: --, ABG - ( 11 Jan 2023 02:02 )  pH, Arterial: 7.41  pH, Blood: x     /  pCO2: 30    /  pO2: 119   / HCO3: 19    / Base Excess: -4.5  /  SaO2: 99.3              Mode: AC/ CMV (Assist Control/ Continuous Mandatory Ventilation)  RR (machine): 16  TV (machine): 550  FiO2: 60  PEEP: 7  ITime: 1  MAP: 11  PIP: 23    Intake and output was reviewed and the fluid balance was calculated  Daily Height in cm: 180 (10 Stefano 2023 11:06)    Daily   I&O's Summary    10 Stefano 2023 07:01  -  11 Jan 2023 07:00  --------------------------------------------------------  IN: 3187.3 mL / OUT: 1800 mL / NET: 1387.3 mL        All lines and drain sites were assessed  Glycemic trend was reviewedCAPILLARY BLOOD GLUCOSE      POCT Blood Glucose.: 102 mg/dL (11 Jan 2023 07:56)    No acute change in focality  Auscultation of the chest reveals equal bs  Abdomen is soft  Extremities are warm and well perfused  Wounds appear clean and unremarkable  Antibiotics are periop    labs  CBC Full  -  ( 11 Jan 2023 02:02 )  WBC Count : 12.18 K/uL  RBC Count : 4.21 M/uL  Hemoglobin : 13.1 g/dL  Hematocrit : 37.8 %  Platelet Count - Automated : 112 K/uL  Mean Cell Volume : 89.8 fl  Mean Cell Hemoglobin : 31.1 pg  Mean Cell Hemoglobin Concentration : 34.7 gm/dL  Auto Neutrophil # : x  Auto Lymphocyte # : x  Auto Monocyte # : x  Auto Eosinophil # : x  Auto Basophil # : x  Auto Neutrophil % : x  Auto Lymphocyte % : x  Auto Monocyte % : x  Auto Eosinophil % : x  Auto Basophil % : x    01-11    141  |  110<H>  |  23  ----------------------------<  193<H>  3.8   |  22  |  1.11    Ca    8.4      11 Jan 2023 02:02  Phos  1.9     01-11  Mg     2.1     01-11    TPro  6.1  /  Alb  4.1  /  TBili  2.0<H>  /  DBili  x   /  AST  138<H>  /  ALT  47<H>  /  AlkPhos  34<L>  01-11    PT/INR - ( 11 Jan 2023 02:02 )   PT: 17.8 sec;   INR: 1.49          PTT - ( 11 Jan 2023 02:02 )  PTT:25.3 sec  The current medications were reviewed   MEDICATIONS  (STANDING):  acetaminophen   IVPB .. 1000 milliGRAM(s) IV Intermittent once  aspirin enteric coated 81 milliGRAM(s) Oral daily  ceFAZolin   IVPB 2000 milliGRAM(s) IV Intermittent every 8 hours  chlorhexidine 0.12% Liquid 15 milliLiter(s) Oral Mucosa every 12 hours  chlorhexidine 2% Cloths 1 Application(s) Topical daily  dexMEDEtomidine Infusion 0.2 MICROgram(s)/kG/Hr (5.6 mL/Hr) IV Continuous <Continuous>  dextrose 50% Injectable 50 milliLiter(s) IV Push every 15 minutes  dextrose 50% Injectable 25 milliLiter(s) IV Push every 15 minutes  DOBUTamine Infusion 5 MICROgram(s)/kG/Min (16.8 mL/Hr) IV Continuous <Continuous>  heparin   Injectable 5000 Unit(s) SubCutaneous every 8 hours  insulin regular Infusion 1 Unit(s)/Hr (1 mL/Hr) IV Continuous <Continuous>  milrinone Infusion 0.25 MICROgram(s)/kG/Min (8.4 mL/Hr) IV Continuous <Continuous>  norepinephrine Infusion 0.05 MICROgram(s)/kG/Min (10.5 mL/Hr) IV Continuous <Continuous>  pantoprazole  Injectable 40 milliGRAM(s) IV Push daily  propofol Infusion 5 MICROgram(s)/kG/Min (3.36 mL/Hr) IV Continuous <Continuous>  sodium chloride 0.9%. 1000 milliLiter(s) (10 mL/Hr) IV Continuous <Continuous>  vasopressin Infusion 0.02 Unit(s)/Min (3 mL/Hr) IV Continuous <Continuous>    MEDICATIONS  (PRN):  fentaNYL    Injectable 25 MICROGram(s) IV Push every 3 hours PRN Severe Pain (7 - 10)       PROBLEM LIST/ ASSESSMENT:  HEALTH ISSUES - PROBLEM Dx:      ,   Patient is a 72y old  Male who presents with a chief complaint of    s/p cardiac surgery                My plan includes :  close hemodynamic, ventilatory and drain monitoring and management per post op routine    Monitor for arrhythmias and monitor parameters for organ perfusion  beta blockade not administered due to hemodynamic instability and bradycardia  monitor neurologic status  Head of the bed should remain elevated to 45 deg .   chest PT and IS will be encouraged  monitor adequacy of oxygenation and ventilation and attempt to wean oxygen  antibiotic regimen will be tailored to the clinical, laboratory and microbiologic data  Nutritional goals will be met using po eventually , ensure adequate caloric intake and montior the same  Stress ulcer and VTE prophylaxis will be achieved    Glycemic control is satisfactory  Electrolytes have been repleted as necessary and wound care has been carried out. Pain control has been achieved.   agressive physical therapy and early mobility and ambulation goals will be met   The family was updated about the course and plan  CRITICAL CARE TIME personally provided by me  in evaluation and management, reassessments, review and interpretation of labs and x-rays, ventilator and hemodynamic management, formulating a plan and coordinating care: ___90____ MIN.  Time does not include procedural time. Time spent was non routine post-operarive caRE and included multiple and repeated evaluations at the bedside  CTICU ATTENDING     					    Dmitry Swan MD

## 2023-01-11 NOTE — PROCEDURE NOTE - NSINDICATIONS_GEN_A_CORE
monitoring purposes
arterial puncture to obtain ABG's/cannulation purposes/critical patient/monitoring purposes

## 2023-01-12 LAB
ALBUMIN SERPL ELPH-MCNC: 3.6 G/DL — SIGNIFICANT CHANGE UP (ref 3.3–5)
ALBUMIN SERPL ELPH-MCNC: 3.6 G/DL — SIGNIFICANT CHANGE UP (ref 3.3–5)
ALBUMIN SERPL ELPH-MCNC: 3.7 G/DL — SIGNIFICANT CHANGE UP (ref 3.3–5)
ALP SERPL-CCNC: 30 U/L — LOW (ref 40–120)
ALP SERPL-CCNC: 32 U/L — LOW (ref 40–120)
ALP SERPL-CCNC: 39 U/L — LOW (ref 40–120)
ALT FLD-CCNC: 17 U/L — SIGNIFICANT CHANGE UP (ref 10–45)
ALT FLD-CCNC: 22 U/L — SIGNIFICANT CHANGE UP (ref 10–45)
ALT FLD-CCNC: 24 U/L — SIGNIFICANT CHANGE UP (ref 10–45)
ANION GAP SERPL CALC-SCNC: 8 MMOL/L — SIGNIFICANT CHANGE UP (ref 5–17)
ANION GAP SERPL CALC-SCNC: 8 MMOL/L — SIGNIFICANT CHANGE UP (ref 5–17)
ANION GAP SERPL CALC-SCNC: 9 MMOL/L — SIGNIFICANT CHANGE UP (ref 5–17)
APTT BLD: 21.2 SEC — LOW (ref 27.5–35.5)
APTT BLD: 27.6 SEC — SIGNIFICANT CHANGE UP (ref 27.5–35.5)
APTT BLD: 28.5 SEC — SIGNIFICANT CHANGE UP (ref 27.5–35.5)
APTT BLD: 28.8 SEC — SIGNIFICANT CHANGE UP (ref 27.5–35.5)
AST SERPL-CCNC: 45 U/L — HIGH (ref 10–40)
AST SERPL-CCNC: 62 U/L — HIGH (ref 10–40)
AST SERPL-CCNC: 70 U/L — HIGH (ref 10–40)
BASE EXCESS BLDA CALC-SCNC: 3.9 MMOL/L — HIGH (ref -2–3)
BASE EXCESS BLDV CALC-SCNC: 3.2 MMOL/L — HIGH (ref -2–3)
BASE EXCESS BLDV CALC-SCNC: 4.5 MMOL/L — HIGH (ref -2–3)
BASE EXCESS BLDV CALC-SCNC: 4.5 MMOL/L — HIGH (ref -2–3)
BASE EXCESS BLDV CALC-SCNC: 4.8 MMOL/L — HIGH (ref -2–3)
BILIRUB SERPL-MCNC: 1.2 MG/DL — SIGNIFICANT CHANGE UP (ref 0.2–1.2)
BILIRUB SERPL-MCNC: 1.4 MG/DL — HIGH (ref 0.2–1.2)
BILIRUB SERPL-MCNC: 1.5 MG/DL — HIGH (ref 0.2–1.2)
BUN SERPL-MCNC: 16 MG/DL — SIGNIFICANT CHANGE UP (ref 7–23)
BUN SERPL-MCNC: 17 MG/DL — SIGNIFICANT CHANGE UP (ref 7–23)
BUN SERPL-MCNC: 18 MG/DL — SIGNIFICANT CHANGE UP (ref 7–23)
CALCIUM SERPL-MCNC: 8 MG/DL — LOW (ref 8.4–10.5)
CALCIUM SERPL-MCNC: 8.6 MG/DL — SIGNIFICANT CHANGE UP (ref 8.4–10.5)
CALCIUM SERPL-MCNC: 8.7 MG/DL — SIGNIFICANT CHANGE UP (ref 8.4–10.5)
CHLORIDE SERPL-SCNC: 101 MMOL/L — SIGNIFICANT CHANGE UP (ref 96–108)
CHLORIDE SERPL-SCNC: 101 MMOL/L — SIGNIFICANT CHANGE UP (ref 96–108)
CHLORIDE SERPL-SCNC: 103 MMOL/L — SIGNIFICANT CHANGE UP (ref 96–108)
CO2 BLDA-SCNC: 29 MMOL/L — HIGH (ref 19–24)
CO2 BLDV-SCNC: 29.2 MMOL/L — HIGH (ref 22–26)
CO2 BLDV-SCNC: 31.2 MMOL/L — HIGH (ref 22–26)
CO2 BLDV-SCNC: 31.2 MMOL/L — HIGH (ref 22–26)
CO2 BLDV-SCNC: 31.3 MMOL/L — HIGH (ref 22–26)
CO2 SERPL-SCNC: 26 MMOL/L — SIGNIFICANT CHANGE UP (ref 22–31)
CO2 SERPL-SCNC: 26 MMOL/L — SIGNIFICANT CHANGE UP (ref 22–31)
CO2 SERPL-SCNC: 27 MMOL/L — SIGNIFICANT CHANGE UP (ref 22–31)
CREAT SERPL-MCNC: 0.83 MG/DL — SIGNIFICANT CHANGE UP (ref 0.5–1.3)
CREAT SERPL-MCNC: 0.86 MG/DL — SIGNIFICANT CHANGE UP (ref 0.5–1.3)
CREAT SERPL-MCNC: 0.89 MG/DL — SIGNIFICANT CHANGE UP (ref 0.5–1.3)
EGFR: 91 ML/MIN/1.73M2 — SIGNIFICANT CHANGE UP
EGFR: 92 ML/MIN/1.73M2 — SIGNIFICANT CHANGE UP
EGFR: 93 ML/MIN/1.73M2 — SIGNIFICANT CHANGE UP
GAS PNL BLDA: SIGNIFICANT CHANGE UP
GLUCOSE BLDC GLUCOMTR-MCNC: 105 MG/DL — HIGH (ref 70–99)
GLUCOSE BLDC GLUCOMTR-MCNC: 117 MG/DL — HIGH (ref 70–99)
GLUCOSE BLDC GLUCOMTR-MCNC: 126 MG/DL — HIGH (ref 70–99)
GLUCOSE BLDC GLUCOMTR-MCNC: 129 MG/DL — HIGH (ref 70–99)
GLUCOSE SERPL-MCNC: 119 MG/DL — HIGH (ref 70–99)
GLUCOSE SERPL-MCNC: 144 MG/DL — HIGH (ref 70–99)
GLUCOSE SERPL-MCNC: 164 MG/DL — HIGH (ref 70–99)
HCO3 BLDA-SCNC: 28 MMOL/L — SIGNIFICANT CHANGE UP (ref 21–28)
HCO3 BLDV-SCNC: 28 MMOL/L — SIGNIFICANT CHANGE UP (ref 22–29)
HCO3 BLDV-SCNC: 30 MMOL/L — HIGH (ref 22–29)
HCT VFR BLD CALC: 31.2 % — LOW (ref 39–50)
HCT VFR BLD CALC: 31.5 % — LOW (ref 39–50)
HCT VFR BLD CALC: 33 % — LOW (ref 39–50)
HGB BLD-MCNC: 10.8 G/DL — LOW (ref 13–17)
HGB BLD-MCNC: 10.9 G/DL — LOW (ref 13–17)
HGB BLD-MCNC: 11.4 G/DL — LOW (ref 13–17)
INR BLD: 1.48 — HIGH (ref 0.88–1.16)
INR BLD: 1.56 — HIGH (ref 0.88–1.16)
INR BLD: 1.61 — HIGH (ref 0.88–1.16)
INR BLD: 1.66 — HIGH (ref 0.88–1.16)
LACTATE SERPL-SCNC: 0.9 MMOL/L — SIGNIFICANT CHANGE UP (ref 0.5–2)
LACTATE SERPL-SCNC: 0.9 MMOL/L — SIGNIFICANT CHANGE UP (ref 0.5–2)
LACTATE SERPL-SCNC: 1.5 MMOL/L — SIGNIFICANT CHANGE UP (ref 0.5–2)
MAGNESIUM SERPL-MCNC: 1.9 MG/DL — SIGNIFICANT CHANGE UP (ref 1.6–2.6)
MAGNESIUM SERPL-MCNC: 2 MG/DL — SIGNIFICANT CHANGE UP (ref 1.6–2.6)
MAGNESIUM SERPL-MCNC: 2.8 MG/DL — HIGH (ref 1.6–2.6)
MCHC RBC-ENTMCNC: 31 PG — SIGNIFICANT CHANGE UP (ref 27–34)
MCHC RBC-ENTMCNC: 31.3 PG — SIGNIFICANT CHANGE UP (ref 27–34)
MCHC RBC-ENTMCNC: 31.4 PG — SIGNIFICANT CHANGE UP (ref 27–34)
MCHC RBC-ENTMCNC: 34.5 GM/DL — SIGNIFICANT CHANGE UP (ref 32–36)
MCHC RBC-ENTMCNC: 34.6 GM/DL — SIGNIFICANT CHANGE UP (ref 32–36)
MCHC RBC-ENTMCNC: 34.6 GM/DL — SIGNIFICANT CHANGE UP (ref 32–36)
MCV RBC AUTO: 89.5 FL — SIGNIFICANT CHANGE UP (ref 80–100)
MCV RBC AUTO: 90.7 FL — SIGNIFICANT CHANGE UP (ref 80–100)
MCV RBC AUTO: 90.7 FL — SIGNIFICANT CHANGE UP (ref 80–100)
NRBC # BLD: 0 /100 WBCS — SIGNIFICANT CHANGE UP (ref 0–0)
PCO2 BLDA: 38 MMHG — SIGNIFICANT CHANGE UP (ref 35–48)
PCO2 BLDV: 42 MMHG — SIGNIFICANT CHANGE UP (ref 42–55)
PCO2 BLDV: 45 MMHG — SIGNIFICANT CHANGE UP (ref 42–55)
PCO2 BLDV: 46 MMHG — SIGNIFICANT CHANGE UP (ref 42–55)
PCO2 BLDV: 46 MMHG — SIGNIFICANT CHANGE UP (ref 42–55)
PH BLDA: 7.47 — HIGH (ref 7.35–7.45)
PH BLDV: 7.42 — SIGNIFICANT CHANGE UP (ref 7.32–7.43)
PH BLDV: 7.42 — SIGNIFICANT CHANGE UP (ref 7.32–7.43)
PH BLDV: 7.43 — SIGNIFICANT CHANGE UP (ref 7.32–7.43)
PH BLDV: 7.43 — SIGNIFICANT CHANGE UP (ref 7.32–7.43)
PHOSPHATE SERPL-MCNC: 1.8 MG/DL — LOW (ref 2.5–4.5)
PHOSPHATE SERPL-MCNC: 2.1 MG/DL — LOW (ref 2.5–4.5)
PHOSPHATE SERPL-MCNC: 3.1 MG/DL — SIGNIFICANT CHANGE UP (ref 2.5–4.5)
PLATELET # BLD AUTO: 103 K/UL — LOW (ref 150–400)
PLATELET # BLD AUTO: 105 K/UL — LOW (ref 150–400)
PLATELET # BLD AUTO: 110 K/UL — LOW (ref 150–400)
PO2 BLDA: 105 MMHG — SIGNIFICANT CHANGE UP (ref 83–108)
PO2 BLDV: 36 MMHG — SIGNIFICANT CHANGE UP (ref 25–45)
PO2 BLDV: 38 MMHG — SIGNIFICANT CHANGE UP (ref 25–45)
PO2 BLDV: 41 MMHG — SIGNIFICANT CHANGE UP (ref 25–45)
PO2 BLDV: <33 MMHG — SIGNIFICANT CHANGE UP (ref 25–45)
POTASSIUM SERPL-MCNC: 3.7 MMOL/L — SIGNIFICANT CHANGE UP (ref 3.5–5.3)
POTASSIUM SERPL-MCNC: 3.9 MMOL/L — SIGNIFICANT CHANGE UP (ref 3.5–5.3)
POTASSIUM SERPL-MCNC: 4 MMOL/L — SIGNIFICANT CHANGE UP (ref 3.5–5.3)
POTASSIUM SERPL-SCNC: 3.7 MMOL/L — SIGNIFICANT CHANGE UP (ref 3.5–5.3)
POTASSIUM SERPL-SCNC: 3.9 MMOL/L — SIGNIFICANT CHANGE UP (ref 3.5–5.3)
POTASSIUM SERPL-SCNC: 4 MMOL/L — SIGNIFICANT CHANGE UP (ref 3.5–5.3)
PROT SERPL-MCNC: 5.5 G/DL — LOW (ref 6–8.3)
PROT SERPL-MCNC: 5.9 G/DL — LOW (ref 6–8.3)
PROT SERPL-MCNC: 6 G/DL — SIGNIFICANT CHANGE UP (ref 6–8.3)
PROTHROM AB SERPL-ACNC: 17.7 SEC — HIGH (ref 10.5–13.4)
PROTHROM AB SERPL-ACNC: 18.6 SEC — HIGH (ref 10.5–13.4)
PROTHROM AB SERPL-ACNC: 19.3 SEC — HIGH (ref 10.5–13.4)
PROTHROM AB SERPL-ACNC: 19.9 SEC — HIGH (ref 10.5–13.4)
RBC # BLD: 3.44 M/UL — LOW (ref 4.2–5.8)
RBC # BLD: 3.52 M/UL — LOW (ref 4.2–5.8)
RBC # BLD: 3.64 M/UL — LOW (ref 4.2–5.8)
RBC # FLD: 13.5 % — SIGNIFICANT CHANGE UP (ref 10.3–14.5)
RBC # FLD: 13.6 % — SIGNIFICANT CHANGE UP (ref 10.3–14.5)
RBC # FLD: 13.7 % — SIGNIFICANT CHANGE UP (ref 10.3–14.5)
SAO2 % BLDA: 99 % — HIGH (ref 94–98)
SAO2 % BLDV: 53.5 % — LOW (ref 67–88)
SAO2 % BLDV: 65.2 % — LOW (ref 67–88)
SAO2 % BLDV: 65.7 % — LOW (ref 67–88)
SAO2 % BLDV: 72.7 % — SIGNIFICANT CHANGE UP (ref 67–88)
SODIUM SERPL-SCNC: 135 MMOL/L — SIGNIFICANT CHANGE UP (ref 135–145)
SODIUM SERPL-SCNC: 136 MMOL/L — SIGNIFICANT CHANGE UP (ref 135–145)
SODIUM SERPL-SCNC: 138 MMOL/L — SIGNIFICANT CHANGE UP (ref 135–145)
WBC # BLD: 12.42 K/UL — HIGH (ref 3.8–10.5)
WBC # BLD: 13.34 K/UL — HIGH (ref 3.8–10.5)
WBC # BLD: 13.76 K/UL — HIGH (ref 3.8–10.5)
WBC # FLD AUTO: 12.42 K/UL — HIGH (ref 3.8–10.5)
WBC # FLD AUTO: 13.34 K/UL — HIGH (ref 3.8–10.5)
WBC # FLD AUTO: 13.76 K/UL — HIGH (ref 3.8–10.5)

## 2023-01-12 PROCEDURE — 71045 X-RAY EXAM CHEST 1 VIEW: CPT | Mod: 26,77

## 2023-01-12 PROCEDURE — 99292 CRITICAL CARE ADDL 30 MIN: CPT

## 2023-01-12 PROCEDURE — 93308 TTE F-UP OR LMTD: CPT | Mod: 26

## 2023-01-12 PROCEDURE — 99233 SBSQ HOSP IP/OBS HIGH 50: CPT

## 2023-01-12 PROCEDURE — 71045 X-RAY EXAM CHEST 1 VIEW: CPT | Mod: 26

## 2023-01-12 PROCEDURE — 99291 CRITICAL CARE FIRST HOUR: CPT

## 2023-01-12 PROCEDURE — 99223 1ST HOSP IP/OBS HIGH 75: CPT

## 2023-01-12 RX ORDER — ALBUMIN HUMAN 25 %
250 VIAL (ML) INTRAVENOUS ONCE
Refills: 0 | Status: COMPLETED | OUTPATIENT
Start: 2023-01-12 | End: 2023-01-12

## 2023-01-12 RX ORDER — MAGNESIUM SULFATE 500 MG/ML
2 VIAL (ML) INJECTION ONCE
Refills: 0 | Status: COMPLETED | OUTPATIENT
Start: 2023-01-12 | End: 2023-01-12

## 2023-01-12 RX ORDER — FUROSEMIDE 40 MG
10 TABLET ORAL ONCE
Refills: 0 | Status: DISCONTINUED | OUTPATIENT
Start: 2023-01-12 | End: 2023-01-13

## 2023-01-12 RX ORDER — POTASSIUM PHOSPHATE, MONOBASIC POTASSIUM PHOSPHATE, DIBASIC 236; 224 MG/ML; MG/ML
30 INJECTION, SOLUTION INTRAVENOUS ONCE
Refills: 0 | Status: COMPLETED | OUTPATIENT
Start: 2023-01-12 | End: 2023-01-13

## 2023-01-12 RX ORDER — MAGNESIUM SULFATE 500 MG/ML
2 VIAL (ML) INJECTION ONCE
Refills: 0 | Status: COMPLETED | OUTPATIENT
Start: 2023-01-12 | End: 2023-01-13

## 2023-01-12 RX ORDER — POTASSIUM PHOSPHATE, MONOBASIC POTASSIUM PHOSPHATE, DIBASIC 236; 224 MG/ML; MG/ML
15 INJECTION, SOLUTION INTRAVENOUS ONCE
Refills: 0 | Status: COMPLETED | OUTPATIENT
Start: 2023-01-12 | End: 2023-01-12

## 2023-01-12 RX ORDER — POTASSIUM CHLORIDE 20 MEQ
20 PACKET (EA) ORAL ONCE
Refills: 0 | Status: DISCONTINUED | OUTPATIENT
Start: 2023-01-12 | End: 2023-01-12

## 2023-01-12 RX ORDER — POTASSIUM CHLORIDE 20 MEQ
20 PACKET (EA) ORAL
Refills: 0 | Status: COMPLETED | OUTPATIENT
Start: 2023-01-12 | End: 2023-01-12

## 2023-01-12 RX ORDER — FUROSEMIDE 40 MG
20 TABLET ORAL ONCE
Refills: 0 | Status: COMPLETED | OUTPATIENT
Start: 2023-01-12 | End: 2023-01-12

## 2023-01-12 RX ORDER — POTASSIUM CHLORIDE 20 MEQ
20 PACKET (EA) ORAL ONCE
Refills: 0 | Status: COMPLETED | OUTPATIENT
Start: 2023-01-12 | End: 2023-01-12

## 2023-01-12 RX ORDER — FUROSEMIDE 40 MG
20 TABLET ORAL ONCE
Refills: 0 | Status: DISCONTINUED | OUTPATIENT
Start: 2023-01-12 | End: 2023-01-13

## 2023-01-12 RX ADMIN — Medication 100 MILLIEQUIVALENT(S): at 05:00

## 2023-01-12 RX ADMIN — Medication 25 GRAM(S): at 12:33

## 2023-01-12 RX ADMIN — PANTOPRAZOLE SODIUM 40 MILLIGRAM(S): 20 TABLET, DELAYED RELEASE ORAL at 05:38

## 2023-01-12 RX ADMIN — Medication 125 MILLILITER(S): at 19:26

## 2023-01-12 RX ADMIN — MILRINONE LACTATE 12.6 MICROGRAM(S)/KG/MIN: 1 INJECTION, SOLUTION INTRAVENOUS at 16:44

## 2023-01-12 RX ADMIN — Medication 16.8 MICROGRAM(S)/KG/MIN: at 16:44

## 2023-01-12 RX ADMIN — Medication 400 MILLIGRAM(S): at 02:40

## 2023-01-12 RX ADMIN — Medication 81 MILLIGRAM(S): at 14:01

## 2023-01-12 RX ADMIN — OXYCODONE HYDROCHLORIDE 10 MILLIGRAM(S): 5 TABLET ORAL at 14:02

## 2023-01-12 RX ADMIN — HEPARIN SODIUM 5000 UNIT(S): 5000 INJECTION INTRAVENOUS; SUBCUTANEOUS at 14:26

## 2023-01-12 RX ADMIN — Medication 100 MILLIGRAM(S): at 01:35

## 2023-01-12 RX ADMIN — Medication 100 MILLIEQUIVALENT(S): at 19:31

## 2023-01-12 RX ADMIN — OXYCODONE HYDROCHLORIDE 10 MILLIGRAM(S): 5 TABLET ORAL at 15:00

## 2023-01-12 RX ADMIN — HEPARIN SODIUM 5000 UNIT(S): 5000 INJECTION INTRAVENOUS; SUBCUTANEOUS at 05:38

## 2023-01-12 RX ADMIN — Medication 100 MILLIGRAM(S): at 11:20

## 2023-01-12 RX ADMIN — Medication 200 GRAM(S): at 00:41

## 2023-01-12 RX ADMIN — POTASSIUM PHOSPHATE, MONOBASIC POTASSIUM PHOSPHATE, DIBASIC 62.5 MILLIMOLE(S): 236; 224 INJECTION, SOLUTION INTRAVENOUS at 14:02

## 2023-01-12 RX ADMIN — Medication 20 MILLIGRAM(S): at 04:58

## 2023-01-12 RX ADMIN — Medication 50 MILLIEQUIVALENT(S): at 01:34

## 2023-01-12 RX ADMIN — CHLORHEXIDINE GLUCONATE 1 APPLICATION(S): 213 SOLUTION TOPICAL at 12:35

## 2023-01-12 RX ADMIN — Medication 1000 MILLIGRAM(S): at 03:10

## 2023-01-12 RX ADMIN — Medication 25 GRAM(S): at 00:39

## 2023-01-12 RX ADMIN — HEPARIN SODIUM 5000 UNIT(S): 5000 INJECTION INTRAVENOUS; SUBCUTANEOUS at 23:30

## 2023-01-12 RX ADMIN — FENTANYL CITRATE 25 MICROGRAM(S): 50 INJECTION INTRAVENOUS at 18:45

## 2023-01-12 RX ADMIN — Medication 100 MILLIEQUIVALENT(S): at 06:50

## 2023-01-12 RX ADMIN — FENTANYL CITRATE 25 MICROGRAM(S): 50 INJECTION INTRAVENOUS at 18:32

## 2023-01-12 RX ADMIN — Medication 50 MILLIEQUIVALENT(S): at 03:58

## 2023-01-12 NOTE — CONSULT NOTE ADULT - SUBJECTIVE AND OBJECTIVE BOX
Electrophysiology Consult Note:     CHIEF COMPLAINT:  Patient is a 72y old  Male who presents with a chief complaint of I25.10     (2023 16:08)        HISTORY OF PRESENT ILLNESS:   73 y/o M with history of HTN  atrial fibrillation (on coumadin), renal calculi, gout, hemochromatosis (managed by Dr. Tenzin Gomez), nonischemic cardiomyopathy (EF: 50-55% on echo in 2022), and severe mitral regurgitation.  He is now s/p MV repair, MAZE, AtriClip on 1/10/23.  Intraop LVEF 25-30%.  POD1 pt was in/out of AFIB. Today, telemetry with HR in the 80s.  There is questionable AV dissociation based on EKG today.   Pt was put on BIPAP today due to some SOB.  On low dose dubutamine and milrinone gtt.          PAST MEDICAL & SURGICAL HISTORY:  Hypertension  Renal calculi  Gout  History of hemochromatosis  Nonischemic cardiomyopathy  Mitral regurgitation  History of cholecystectomy    FAMILY HISTORY:  FH: arthritis (Mother)  FH: glaucoma (Sibling)  FH: hypothyroidism (Sibling)      SOCIAL HISTORY:    former smoker; quit 43 years ago; smoked 1 PPD x 25 years  social ETOH user  denies any Illicit drug use    Allergies  No Known Allergies        MEDICATIONS  (STANDING):  aspirin enteric coated 81 milliGRAM(s) Oral daily  chlorhexidine 2% Cloths 1 Application(s) Topical daily  dexMEDEtomidine Infusion 0.2 MICROgram(s)/kG/Hr (5.6 mL/Hr) IV Continuous <Continuous>  dexMEDEtomidine Infusion 0.1 MICROgram(s)/kG/Hr (2.8 mL/Hr) IV Continuous <Continuous>  DOBUTamine Infusion 5 MICROgram(s)/kG/Min (16.8 mL/Hr) IV Continuous <Continuous>  glucagon  Injectable 1 milliGRAM(s) IntraMuscular once  heparin   Injectable 5000 Unit(s) SubCutaneous every 8 hours  insulin lispro (ADMELOG) corrective regimen sliding scale   SubCutaneous Before meals and at bedtime  milrinone Infusion 0.375 MICROgram(s)/kG/Min (12.6 mL/Hr) IV Continuous <Continuous>  pantoprazole    Tablet 40 milliGRAM(s) Oral before breakfast  polyethylene glycol 3350 17 Gram(s) Oral at bedtime      MEDICATIONS  (PRN):  acetaminophen     Tablet .. 650 milliGRAM(s) Oral every 6 hours PRN Mild Pain (1 - 3)  dextrose Oral Gel 15 Gram(s) Oral once PRN Blood Glucose LESS THAN 70 milliGRAM(s)/deciliter  fentaNYL    Injectable 25 MICROGram(s) IV Push every 3 hours PRN Severe Pain (7 - 10)  oxyCODONE    IR 5 milliGRAM(s) Oral every 4 hours PRN Moderate Pain (4 - 6)  oxyCODONE    IR 10 milliGRAM(s) Oral every 6 hours PRN Severe Pain (7 - 10)        REVIEW OF SYSTEMS:  CONSTITUTIONAL: No fever, weight loss.  +fatigue  EYES: No eye pain, visual disturbances, or discharge  ENMT:  No difficulty hearing, tinnitus, vertigo; No sinus or throat pain  NECK: No pain or stiffness  RESPIRATORY: No cough, wheezing, chills or hemoptysis; + Shortness of Breath  CARDIOVASCULAR: sternal wound pain. NO palpitations.   GASTROINTESTINAL: no n/v/d or abd pain.   GENITOURINARY: No dysuria, frequency, hematuria, or incontinence  NEUROLOGICAL: No headaches, memory loss, loss of strength, numbness, or tremors  SKIN: No itching, burning, rashes, or lesions   LYMPH Nodes: No enlarged glands  ENDOCRINE: No heat or cold intolerance; No hair loss  MUSCULOSKELETAL: No joint pain or swelling; No muscle, back, or extremity pain  PSYCHIATRIC: No depression, anxiety, mood swings, or difficulty sleeping  HEME/LYMPH: No easy bruising, or bleeding gums  ALLERY AND IMMUNOLOGIC: No hives or eczema	      PHYSICAL EXAM:  Vital Signs Last 24 Hrs  T(C): 36.7 (2023 13:49), Max: 37.4 (2023 22:00)  T(F): 98 (2023 13:49), Max: 99.3 (2023 22:00)  HR: 87 (2023 16:00) (72 - 106)  BP: 114/69 (2023 23:00) (114/69 - 138/64)  BP(mean): 87 (2023 23:00) (85 - 87)  RR: 20 (2023 14:00) (14 - 21)  SpO2: 98% (2023 16:00) (83% - 100%)    Parameters below as of 2023 16:00  Patient On (Oxygen Delivery Method): nasal cannula, high flow  O2 Flow (L/min): 50  O2 Concentration (%): 50    Constitutional: NAD. On BIPAP 	  CVS: Normal S1 / S2, regular, sternal wound intact.   Pulm: diminished SB anteriorly.   GI:  + BS, soft, NT / ND   Ext: No LE edema      	  LABS:	                         11.4   13.76 )-----------( 103      ( 2023 10:23 )             33.0     12    136  |  101  |  17  ----------------------------<  164<H>  4.0   |  27  |  0.83    Ca    8.7      2023 10:23  Phos  1.8       Mg     2.0         TPro  6.0  /  Alb  3.7  /  TBili  1.5<H>  /  DBili  x   /  AST  62<H>  /  ALT  22  /  AlkPhos  32<L>      EK23: AFIB VR 86 bpm.  ms.   EK/10/23: NSR 80 bpm. first degree AVB  ms. QRS 98 ms. QTc 502 ms.   EK23: Sinus with Wenckebach and PVC.  QRS 98 ms.   Telemetry:  23: in/out of AFIB. , telemetry with HR in the 80s.  There is questionable AV dissociation. HR 80s.

## 2023-01-12 NOTE — DIETITIAN INITIAL EVALUATION ADULT - OTHER INFO
72 year old male, former smoker (1 ppd, quit 43 years ago) with a past medical history of hypertension, renal calculi, gout, hemochromatosis (managed by Dr.Jeffrey Gomez), atrial fibrillation (on Coumadin, last dose 1/3/23), nonischemic cardiomyopathy and severe mitral regurgitation who presents for evaluation and management of his valvular disease. Patient is currently under the care of Dr. Napoleon Corbett. Patient arrived to [facility] for preop cardiac cath 1/9/22 revealing coronaries with mild LI only and known severe MR. Admitted to [facility] under Dr. Atkins for presurgical testing. Now s/p S/P mitral valve repair, cryo maze and occlusion of LA appendage 1/10.       Pt seen at bedside for initial assessment- on NC w/ humidification, precedex, dobutamine and milrinone drips- MAP 74. No n/v/d/c documented at this time. No BM noted at this time. Confirms NKFA. Denies change in appetite PTA; unable to provide full diet recall at this time as pt appeared lethargic (likely in setting of precedex drip).  Reports usual body weight 245 pounds (relatively consistent with dosing weight 246 pounds). Notes weight has been stable. No edema documented at this time. No pressure ulcers documented at this time. Surgical incision per chart. Alexandro score=15. Labs reviewed 1/12; noted w/ hypermagnesemia. Fingersticks 1/11-1/12:  mg/dL; insulin regimen ordered. Observed pt with no overt signs of muscle or fat wasting. Based on ASPEN guidelines, pt does not meet criteria for malnutrition at this time. Provided pt with diet education regarding importance of meeting nutritional needs post operatively ; amenable to education. Discussed current diet order CTSCHO low sodium; provided brief diet ed, however, pt would benefit from diet ed reinforcement as pt appeared lethargic. Pt reports feeling hungry during hospital stay, able to complete 100% of meals. No cultural, ethnic, Adventist food preferences noted. Made aware RD remains available. RD to follow up. See nutrition recommendations below.

## 2023-01-12 NOTE — DIETITIAN INITIAL EVALUATION ADULT - PERTINENT LABORATORY DATA
01-12    136  |  101  |  17  ----------------------------<  164<H>  4.0   |  27  |  0.83    Ca    8.7      12 Jan 2023 10:23  Phos  1.8     01-12  Mg     2.0     01-12    TPro  6.0  /  Alb  3.7  /  TBili  1.5<H>  /  DBili  x   /  AST  62<H>  /  ALT  22  /  AlkPhos  32<L>  01-12  POCT Blood Glucose.: 126 mg/dL (01-12-23 @ 12:24)  A1C with Estimated Average Glucose Result: 5.6 % (01-09-23 @ 07:39)

## 2023-01-12 NOTE — DIETITIAN INITIAL EVALUATION ADULT - OTHER CALCULATIONS
Based on Standards of Care pt >% IBW thus actual body weight used for all calculations. Needs adjusted for advanced age and post op.

## 2023-01-12 NOTE — DIETITIAN INITIAL EVALUATION ADULT - PERTINENT MEDS FT
MEDICATIONS  (STANDING):  aspirin enteric coated 81 milliGRAM(s) Oral daily  chlorhexidine 2% Cloths 1 Application(s) Topical daily  dexMEDEtomidine Infusion 0.2 MICROgram(s)/kG/Hr (5.6 mL/Hr) IV Continuous <Continuous>  dexMEDEtomidine Infusion 0.1 MICROgram(s)/kG/Hr (2.8 mL/Hr) IV Continuous <Continuous>  dextrose 5%. 1000 milliLiter(s) (100 mL/Hr) IV Continuous <Continuous>  dextrose 5%. 1000 milliLiter(s) (50 mL/Hr) IV Continuous <Continuous>  dextrose 50% Injectable 25 Gram(s) IV Push once  dextrose 50% Injectable 12.5 Gram(s) IV Push once  dextrose 50% Injectable 25 Gram(s) IV Push once  DOBUTamine Infusion 5 MICROgram(s)/kG/Min (16.8 mL/Hr) IV Continuous <Continuous>  glucagon  Injectable 1 milliGRAM(s) IntraMuscular once  heparin   Injectable 5000 Unit(s) SubCutaneous every 8 hours  insulin lispro (ADMELOG) corrective regimen sliding scale   SubCutaneous Before meals and at bedtime  milrinone Infusion 0.375 MICROgram(s)/kG/Min (12.6 mL/Hr) IV Continuous <Continuous>  pantoprazole    Tablet 40 milliGRAM(s) Oral before breakfast  polyethylene glycol 3350 17 Gram(s) Oral at bedtime  sodium chloride 0.9%. 1000 milliLiter(s) (10 mL/Hr) IV Continuous <Continuous>  vasopressin Infusion 0.02 Unit(s)/Min (3 mL/Hr) IV Continuous <Continuous>  vasopressin Infusion 0.033 Unit(s)/Min (4.95 mL/Hr) IV Continuous <Continuous>    MEDICATIONS  (PRN):  acetaminophen     Tablet .. 650 milliGRAM(s) Oral every 6 hours PRN Mild Pain (1 - 3)  dextrose Oral Gel 15 Gram(s) Oral once PRN Blood Glucose LESS THAN 70 milliGRAM(s)/deciliter  fentaNYL    Injectable 25 MICROGram(s) IV Push every 3 hours PRN Severe Pain (7 - 10)  oxyCODONE    IR 5 milliGRAM(s) Oral every 4 hours PRN Moderate Pain (4 - 6)  oxyCODONE    IR 10 milliGRAM(s) Oral every 6 hours PRN Severe Pain (7 - 10)

## 2023-01-12 NOTE — PROGRESS NOTE ADULT - SUBJECTIVE AND OBJECTIVE BOX
CTICU  CRITICAL  CARE  attending     Hand off received 					   Pertinent clinical, laboratory, radiographic, hemodynamic, echocardiographic, respiratory data, microbiologic data and chart were reviewed and analyzed frequently throughout the course of the day and night      72 years old  male (former smoker), with HTN,  atrial fibrillation (on coumadin), renal calculi, gout, hemochromatosis (managed by Dr. Tenzin Gomez), nonischemic cardiomyopathy.  He was evaluated for CHF.  ECHO: Severe mitral regurgitation. Prolapse of the posterior mitral leaflet. Systolic flow reversal in the pulmonary veins. Severely dilated Left atrium.   He presented to Dr. Atkins, for his valvular disease.   S/P Mitral valve repair.  S/P cryo maze  S/P Occlusion of LA appendage.      FAMILY HISTORY:  FH: arthritis (Mother)    FH: glaucoma (Sibling)    FH: hypothyroidism (Sibling)    PAST MEDICAL & SURGICAL HISTORY:  Hypertension  Renal calculi  Gout  History of hemochromatosis  Nonischemic cardiomyopathy  Mitral regurgitation  History of cholecystectomy        14 system review was unremarkable    Vital signs, hemodynamic and respiratory parameters were reviewed from the bedside nursing flow sheet.  ICU Vital Signs Last 24 Hrs  T(C): 37.3 (12 Jan 2023 21:03), Max: 37.3 (12 Jan 2023 21:03)  T(F): 99.2 (12 Jan 2023 21:03), Max: 99.2 (12 Jan 2023 21:03)  HR: 114 (12 Jan 2023 22:00) (72 - 114)  BP: 114/69 (11 Jan 2023 23:00) (114/69 - 114/69)  BP(mean): 87 (11 Jan 2023 23:00) (87 - 87)  ABP: 128/67 (12 Jan 2023 22:00) (100/52 - 143/64)  ABP(mean): 84 (12 Jan 2023 22:00) (68 - 93)  RR: 18 (12 Jan 2023 17:00) (16 - 21)  SpO2: 98% (12 Jan 2023 22:00) (83% - 100%)    O2 Parameters below as of 12 Jan 2023 22:00  Patient On (Oxygen Delivery Method): nasal cannula, high flow  O2 Flow (L/min): 50  O2 Concentration (%): 50      Adult Advanced Hemodynamics Last 24 Hrs  CVP(mm Hg): 12 (12 Jan 2023 22:00) (4 - 18)  CVP(cm H2O): --  CO: 7.9 (12 Jan 2023 05:00) (7.9 - 7.9)  CI: 3.4 (12 Jan 2023 05:00) (3.4 - 3.4)  PA: 33/17 (12 Jan 2023 07:00) (32/12 - 37/20)  PA(mean): 23 (12 Jan 2023 07:00) (20 - 27)  PCWP: --  SVR: 819 (12 Jan 2023 05:00) (819 - 819)  SVRI: 1891 (12 Jan 2023 05:00) (1891 - 1891)  PVR: --  PVRI: --, ABG - ( 12 Jan 2023 22:21 )  pH, Arterial: 7.47  pH, Blood: x     /  pCO2: 38    /  pO2: 105   / HCO3: 28    / Base Excess: 3.9   /  SaO2: 99.0                Intake and output was reviewed and the fluid balance was calculated  Daily     Daily   I&O's Summary    11 Jan 2023 07:01  -  12 Jan 2023 07:00  --------------------------------------------------------  IN: 3683.9 mL / OUT: 3995 mL / NET: -311.1 mL    12 Jan 2023 07:01  -  12 Jan 2023 22:49  --------------------------------------------------------  IN: 867.2 mL / OUT: 2070 mL / NET: -1202.8 mL        All lines and drain sites were assessed    Neuro: No focal motor deficit.  Neck: No JVD.  CVS: S1, S2, No S3.  Lungs: Good air entry bilaterally.  Abd: Soft. No tenderness. + Bowel sounds.  Vascular: + DP/PT.  Extremities: No edema.  Lymphatic: Normal.  Skin: No abnormalities.      labs  CBC Full  -  ( 12 Jan 2023 22:18 )  WBC Count : 12.42 K/uL  RBC Count : 3.44 M/uL  Hemoglobin : 10.8 g/dL  Hematocrit : 31.2 %  Platelet Count - Automated : 110 K/uL  Mean Cell Volume : 90.7 fl  Mean Cell Hemoglobin : 31.4 pg  Mean Cell Hemoglobin Concentration : 34.6 gm/dL  Auto Neutrophil # : x  Auto Lymphocyte # : x  Auto Monocyte # : x  Auto Eosinophil # : x  Auto Basophil # : x  Auto Neutrophil % : x  Auto Lymphocyte % : x  Auto Monocyte % : x  Auto Eosinophil % : x  Auto Basophil % : x    01-12    135  |  101  |  16  ----------------------------<  119<H>  3.9   |  26  |  0.86    Ca    8.7      12 Jan 2023 10:23  Phos  1.8     01-12  Mg     1.9     01-12    TPro  6.0  /  Alb  3.7  /  TBili  1.5<H>  /  DBili  x   /  AST  62<H>  /  ALT  22  /  AlkPhos  32<L>  01-12    PT/INR - ( 12 Jan 2023 22:18 )   PT: 17.7 sec;   INR: 1.48          PTT - ( 12 Jan 2023 22:18 )  PTT:27.6 sec  The current medications were reviewed   MEDICATIONS  (STANDING):  aspirin enteric coated 81 milliGRAM(s) Oral daily  chlorhexidine 2% Cloths 1 Application(s) Topical daily  dexMEDEtomidine Infusion 0.2 MICROgram(s)/kG/Hr (5.6 mL/Hr) IV Continuous <Continuous>  dextrose 5%. 1000 milliLiter(s) (100 mL/Hr) IV Continuous <Continuous>  dextrose 5%. 1000 milliLiter(s) (50 mL/Hr) IV Continuous <Continuous>  dextrose 50% Injectable 25 Gram(s) IV Push once  dextrose 50% Injectable 12.5 Gram(s) IV Push once  dextrose 50% Injectable 25 Gram(s) IV Push once  DOBUTamine Infusion 5 MICROgram(s)/kG/Min (16.8 mL/Hr) IV Continuous <Continuous>  furosemide   Injectable 20 milliGRAM(s) IV Push once  furosemide   Injectable 10 milliGRAM(s) IV Push once  glucagon  Injectable 1 milliGRAM(s) IntraMuscular once  heparin   Injectable 5000 Unit(s) SubCutaneous every 8 hours  insulin lispro (ADMELOG) corrective regimen sliding scale   SubCutaneous Before meals and at bedtime  milrinone Infusion 0.375 MICROgram(s)/kG/Min (12.6 mL/Hr) IV Continuous <Continuous>  pantoprazole    Tablet 40 milliGRAM(s) Oral before breakfast  polyethylene glycol 3350 17 Gram(s) Oral at bedtime  sodium chloride 0.9%. 1000 milliLiter(s) (10 mL/Hr) IV Continuous <Continuous>    MEDICATIONS  (PRN):  acetaminophen     Tablet .. 650 milliGRAM(s) Oral every 6 hours PRN Mild Pain (1 - 3)  dextrose Oral Gel 15 Gram(s) Oral once PRN Blood Glucose LESS THAN 70 milliGRAM(s)/deciliter  fentaNYL    Injectable 25 MICROGram(s) IV Push every 3 hours PRN Severe Pain (7 - 10)  oxyCODONE    IR 5 milliGRAM(s) Oral every 4 hours PRN Moderate Pain (4 - 6)  oxyCODONE    IR 10 milliGRAM(s) Oral every 6 hours PRN Severe Pain (7 - 10)        72 years old  Male with severe mitral regurgitation in the setting of severely compromised left ventricular function.  S/P mitral valve repair.  Hemodynamically stable.  Good oxygenation.  Fair urine out put.  Overall doing well.      My plan includes :  Low dose dobutamine and milrinone.  Statin Rx.  Heart failure Rx.  Gentle diuresis.  Aggressive pulmonary toilet and bronchodilator Rx.   Close hemodynamic, ventilatory and drain monitoring and management  Monitor for arrhythmias and monitor parameters for organ perfusion  Monitor neurologic status  Monitor renal function.  Head of the bed should remain elevated to 45 deg .   Chest PT and IS will be encouraged  Monitor adequacy of oxygenation and ventilation and attempt to wean oxygen  Nutritional goals will be met using po eventually , ensure adequate caloric intake and monitor the same  Stress ulcer and VTE prophylaxis will be achieved    Glycemic control is satisfactory  Electrolytes have been repleted as necessary and wound care has been carried out. Pain control has been achieved.   Aggressive physical therapy and early mobility and ambulation goals will be met   The family was updated about the course and plan  CRITICAL CARE TIME SPENT in evaluation and management, reassessments, review and interpretation of labs and x-rays, ventilator and hemodynamic management, formulating a plan and coordinating care: ___30____ MIN.  Time does not include procedural time.  CTICU ATTENDING     					    Kamari Torres MD

## 2023-01-12 NOTE — CONSULT NOTE ADULT - SUBJECTIVE AND OBJECTIVE BOX
Heart Failure Consult Note    HPI:            PAST MEDICAL & SURGICAL HISTORY:  Hypertension    Renal calculi    Gout    History of hemochromatosis    Nonischemic cardiomyopathy    Mitral regurgitation    History of cholecystectomy        MEDICATIONS  (STANDING):  aspirin enteric coated 81 milliGRAM(s) Oral daily  chlorhexidine 2% Cloths 1 Application(s) Topical daily  dexMEDEtomidine Infusion 0.2 MICROgram(s)/kG/Hr (5.6 mL/Hr) IV Continuous <Continuous>  dextrose 5%. 1000 milliLiter(s) (100 mL/Hr) IV Continuous <Continuous>  dextrose 5%. 1000 milliLiter(s) (50 mL/Hr) IV Continuous <Continuous>  dextrose 50% Injectable 25 Gram(s) IV Push once  dextrose 50% Injectable 12.5 Gram(s) IV Push once  dextrose 50% Injectable 25 Gram(s) IV Push once  DOBUTamine Infusion 5 MICROgram(s)/kG/Min (16.8 mL/Hr) IV Continuous <Continuous>  glucagon  Injectable 1 milliGRAM(s) IntraMuscular once  heparin   Injectable 5000 Unit(s) SubCutaneous every 8 hours  insulin lispro (ADMELOG) corrective regimen sliding scale   SubCutaneous Before meals and at bedtime  milrinone Infusion 0.375 MICROgram(s)/kG/Min (12.6 mL/Hr) IV Continuous <Continuous>  pantoprazole    Tablet 40 milliGRAM(s) Oral before breakfast  polyethylene glycol 3350 17 Gram(s) Oral at bedtime  sodium chloride 0.9%. 1000 milliLiter(s) (10 mL/Hr) IV Continuous <Continuous>  vasopressin Infusion 0.02 Unit(s)/Min (3 mL/Hr) IV Continuous <Continuous>    MEDICATIONS  (PRN):  acetaminophen     Tablet .. 650 milliGRAM(s) Oral every 6 hours PRN Mild Pain (1 - 3)  dextrose Oral Gel 15 Gram(s) Oral once PRN Blood Glucose LESS THAN 70 milliGRAM(s)/deciliter  fentaNYL    Injectable 25 MICROGram(s) IV Push every 3 hours PRN Severe Pain (7 - 10)  oxyCODONE    IR 5 milliGRAM(s) Oral every 4 hours PRN Moderate Pain (4 - 6)  oxyCODONE    IR 10 milliGRAM(s) Oral every 6 hours PRN Severe Pain (7 - 10)    Vital Signs Last 24 Hrs  T(C): 36.8 (12 Jan 2023 17:17), Max: 37.4 (11 Jan 2023 22:00)  T(F): 98.2 (12 Jan 2023 17:17), Max: 99.3 (11 Jan 2023 22:00)  HR: 85 (12 Jan 2023 17:00) (72 - 106)  BP: 114/69 (11 Jan 2023 23:00) (114/69 - 138/64)  BP(mean): 87 (11 Jan 2023 23:00) (85 - 87)  RR: 18 (12 Jan 2023 17:00) (14 - 21)  SpO2: 97% (12 Jan 2023 17:00) (83% - 100%)    Parameters below as of 12 Jan 2023 17:00  Patient On (Oxygen Delivery Method): nasal cannula, high flow  O2 Flow (L/min): 50  O2 Concentration (%): 50    PHYSICAL EXAM:  GEN: Awake, alert. NAD.   HEENT: NCAT, PERRL, EOMI. Mucosa moist. No JVD.  RESP: CTA b/l  CV: RRR. Normal S1/S2. No m/r/g.  ABD: Soft. NT/ND. BS+  EXT: Warm. No edema, clubbing, or cyanosis.   NEURO: AAOx3. No focal deficits.     LABS:                        11.4   13.76 )-----------( 103      ( 12 Jan 2023 10:23 )             33.0     01-12    136  |  101  |  17  ----------------------------<  164<H>  4.0   |  27  |  0.83    Ca    8.7      12 Jan 2023 10:23  Phos  1.8     01-12  Mg     2.0     01-12    TPro  6.0  /  Alb  3.7  /  TBili  1.5<H>  /  DBili  x   /  AST  62<H>  /  ALT  22  /  AlkPhos  32<L>  01-12        PT/INR - ( 12 Jan 2023 10:23 )   PT: 18.6 sec;   INR: 1.56          PTT - ( 12 Jan 2023 10:23 )  PTT:21.2 sec    I&O's Summary    11 Jan 2023 07:01  -  12 Jan 2023 07:00  --------------------------------------------------------  IN: 3683.9 mL / OUT: 3995 mL / NET: -311.1 mL    12 Jan 2023 07:01  -  12 Jan 2023 17:42  --------------------------------------------------------  IN: 588.8 mL / OUT: 1310 mL / NET: -721.2 mL      RADIOLOGY & ADDITIONAL STUDIES:    EKG:         Heart Failure Consult Note    HPI: 72M Former smoker, Hx of hemochromatosis, HTN, AFib (on coumadin), NICM with severe MR presented for elective mitral valve repair of primary severe MR with prolapse of posterior leaflet. Denies any hospitalizations for HF, NYHA II symptoms, walks up to 3 miles a day. No previous LE edema/orthopnea, dyspnea. Underwent MV repair with MAZE and CHERI occlusion on 1/10/22, patient was extubated yesterday and remains on bipap as well as inotropes. Patient has no current complaints.     Home meds: Lasix 40mg, Ramipril 2.5, coreg 25mg BID, allopurinol 100, Coumadin  Social: Former smoker (25 pack year, quit 43 years ago)    Date of HF Diagnosis:   Etiology of CM:  NICM  Date of last hospitalization before current one: None  Number of hospitalizations in last year: NOne  Date of last echocardiogram: Nov 2022  LVEF/LVEDD:   Type of ischemia work up: LHC in Jan 2023  Prior RHC: No   Ever required inotropes/MCS: Yes/No    DM: No  AFib: Yes  CKD:   ICD/CRT: No    Home medications before this current admission:  ACEi/ARB: ramipril  ARNI: no  MRA: no  BB: coreg  SGLT2: no  Nitrates/Hydralazine: no      Best pBNP as outpatient: NA      PAST MEDICAL & SURGICAL HISTORY:  Hypertension    Renal calculi    Gout    History of hemochromatosis    Nonischemic cardiomyopathy    Mitral regurgitation    History of cholecystectomy        MEDICATIONS  (STANDING):  aspirin enteric coated 81 milliGRAM(s) Oral daily  chlorhexidine 2% Cloths 1 Application(s) Topical daily  dexMEDEtomidine Infusion 0.2 MICROgram(s)/kG/Hr (5.6 mL/Hr) IV Continuous <Continuous>  dextrose 5%. 1000 milliLiter(s) (100 mL/Hr) IV Continuous <Continuous>  dextrose 5%. 1000 milliLiter(s) (50 mL/Hr) IV Continuous <Continuous>  dextrose 50% Injectable 25 Gram(s) IV Push once  dextrose 50% Injectable 12.5 Gram(s) IV Push once  dextrose 50% Injectable 25 Gram(s) IV Push once  DOBUTamine Infusion 5 MICROgram(s)/kG/Min (16.8 mL/Hr) IV Continuous <Continuous>  glucagon  Injectable 1 milliGRAM(s) IntraMuscular once  heparin   Injectable 5000 Unit(s) SubCutaneous every 8 hours  insulin lispro (ADMELOG) corrective regimen sliding scale   SubCutaneous Before meals and at bedtime  milrinone Infusion 0.375 MICROgram(s)/kG/Min (12.6 mL/Hr) IV Continuous <Continuous>  pantoprazole    Tablet 40 milliGRAM(s) Oral before breakfast  polyethylene glycol 3350 17 Gram(s) Oral at bedtime  sodium chloride 0.9%. 1000 milliLiter(s) (10 mL/Hr) IV Continuous <Continuous>  vasopressin Infusion 0.02 Unit(s)/Min (3 mL/Hr) IV Continuous <Continuous>    MEDICATIONS  (PRN):  acetaminophen     Tablet .. 650 milliGRAM(s) Oral every 6 hours PRN Mild Pain (1 - 3)  dextrose Oral Gel 15 Gram(s) Oral once PRN Blood Glucose LESS THAN 70 milliGRAM(s)/deciliter  fentaNYL    Injectable 25 MICROGram(s) IV Push every 3 hours PRN Severe Pain (7 - 10)  oxyCODONE    IR 5 milliGRAM(s) Oral every 4 hours PRN Moderate Pain (4 - 6)  oxyCODONE    IR 10 milliGRAM(s) Oral every 6 hours PRN Severe Pain (7 - 10)    Vital Signs Last 24 Hrs  T(C): 36.8 (12 Jan 2023 17:17), Max: 37.4 (11 Jan 2023 22:00)  T(F): 98.2 (12 Jan 2023 17:17), Max: 99.3 (11 Jan 2023 22:00)  HR: 85 (12 Jan 2023 17:00) (72 - 106)  BP: 114/69 (11 Jan 2023 23:00) (114/69 - 138/64)  BP(mean): 87 (11 Jan 2023 23:00) (85 - 87)  RR: 18 (12 Jan 2023 17:00) (14 - 21)  SpO2: 97% (12 Jan 2023 17:00) (83% - 100%)    Parameters below as of 12 Jan 2023 17:00  Patient On (Oxygen Delivery Method): nasal cannula, high flow  O2 Flow (L/min): 50  O2 Concentration (%): 50    PHYSICAL EXAM:  GEN: Awake, alert. NAD.   HEENT: JVP normal  RESP: CTA b/l  CV: IRregular  EXT: Warm. No edema  NEURO: AAOx3. No focal deficits.     LABS:                        11.4   13.76 )-----------( 103      ( 12 Jan 2023 10:23 )             33.0     01-12    136  |  101  |  17  ----------------------------<  164<H>  4.0   |  27  |  0.83    Ca    8.7      12 Jan 2023 10:23  Phos  1.8     01-12  Mg     2.0     01-12    TPro  6.0  /  Alb  3.7  /  TBili  1.5<H>  /  DBili  x   /  AST  62<H>  /  ALT  22  /  AlkPhos  32<L>  01-12        PT/INR - ( 12 Jan 2023 10:23 )   PT: 18.6 sec;   INR: 1.56          PTT - ( 12 Jan 2023 10:23 )  PTT:21.2 sec    I&O's Summary    11 Jan 2023 07:01  -  12 Jan 2023 07:00  --------------------------------------------------------  IN: 3683.9 mL / OUT: 3995 mL / NET: -311.1 mL    12 Jan 2023 07:01  -  12 Jan 2023 17:42  --------------------------------------------------------  IN: 588.8 mL / OUT: 1310 mL / NET: -721.2 mL      RADIOLOGY & ADDITIONAL STUDIES:    EKG:

## 2023-01-12 NOTE — DIETITIAN INITIAL EVALUATION ADULT - NSFNSGIIOFT_GEN_A_CORE
01-11-23 @ 07:01  -  01-12-23 @ 07:00  --------------------------------------------------------  OUT:    Chest Tube (mL): 400 mL  Total OUT: 400 mL    Total NET: -400 mL      01-12-23 @ 07:01  -  01-12-23 @ 16:08  --------------------------------------------------------  OUT:    Chest Tube (mL): 0 mL  Total OUT: 0 mL    Total NET: 0 mL

## 2023-01-12 NOTE — CONSULT NOTE ADULT - ASSESSMENT
71 y/o M with history of HTN, atrial fibrillation (on coumadin), renal calculi, gout, hemochromatosis (managed by Dr. Tenzin Gomez), nonischemic cardiomyopathy (EF: 50-55% on echo in 11/2022), and severe mitral regurgitation.  s/p MV repair, MAZE, AtriClip on 1/10/23.  Has epicardial temp wires (backup use only).  Intraop LVEF 25-30%.  POD1 pt was in/out of AFIB. Today, telemetry with HR in the 80s.  There is questionable AV dissociation based on EKG today.  On low dose dubutamine and milrinone gtt.    - Continue to watch for any bradycardia. Questionable AV dissociation based on telemetry though has stable HR in the 80s. No intervention at this time needed. Will continue to monitor. 
72 year old male, former smoker (1 ppd, quit 43 years ago) with a past medical history of hypertension, renal calculi, gout, hemochromatosis (managed by Dr.Jeffrey Gomez), atrial fibrillation (on Coumadin, last dose 1/3/23), nonischemic cardiomyopathy and severe mitral regurgitation who presents for evaluation and management of his valvular disease. Patient is currently under the care of Dr. Napoleon Corbett. Patient arrived to [facility] for preop cardiac cath 1/9/22 revealing coronaries with mild LI only and known severe MR. Admitted to [facility] under Dr. Atkins for presurgical testing for planned MVR, CHERI occlusion, cryomaze ablation with Dr. Atkins and Dr. Larkin tomorrow, 1/10/22.     Plan:    Neurovascular:   -no current pain    Cardiovascular:   severe MR  -preop MVR, CHERI, cryomaze with Dr. Atkins/Dr. Larkin tomorrow  -pending UTD covid, UA, BNP, T&Sx2, CXR, PFTs, carotid duplex US  afib  -will start heparin gtt pending coags  HTN  -continue coreg  -holding home ramipril  -on coumadin at home  -Hemodynamically stable.   -Monitor: BP, HR, tele    Respiratory:   -Oxygenating well on room air  -Encourage continued use of IS 10x/hr and frequent ambulation    GI:  -GI PPX: continue protonix  -PO Diet  -Bowel Regimen: senna/miralax    Renal / :  -Continue to monitor renal function: BUN/Cr: 37/1.11  -Monitor I/O's daily     Endocrine:    -No hx of DM or thyroid dx  -A1c: 5.6  -TSH: pending  Gout  -holding home allopurinol    Hematologic:  -CBC: H/H- 16.4/48.1  -Coagulation Panel.    ID:  -Temperature: afebrile  -CBC: WBC- 6.81  -Continue to observe for SIRS/Sepsis Syndrome.    Prophylaxis:  -DVT prophylaxis with heparin gtt  -Continue with SCD's b/l while patient is at rest     Disposition:  -OR tomorrow
ASSESSMENT:  72M Former smoker, Hx of hemochromatosis, HTN, AFib (on coumadin), NICM with severe MR presented for elective mitral valve repair of primary severe MR with prolapse of posterior leaflet. Advanced HF consulted for further post op management.     Cardiac Studies:  LHC (1/9/23): No significan coronary disease  CARLY (11/14/22): LVEF 50-55%, severely dilated LA, severe MR prolapse of posterior leaflet w/ PV flow systolic flow reversal, mitral regurgitation jet is directed toward septum, no LA/CHERI thrombus      PLAN:  Nonischemic Cardiomyopathy  Etiology: NICM, LVEF borderline 50-55%  GDMT: Currently on inotropes, continue to wean per primary team and will add GDMT as tolerated  - Continue current management per primary team and will further re-evaluate when weaned off gtts.       Discussed with Dr. Blanchard, HF attending.

## 2023-01-12 NOTE — CONSULT NOTE ADULT - ATTENDING COMMENTS
73 yo man with obesity and prior remote smoking, hemochromatosis, mixed/primary severe MR. Underwent MVr with cryomaze ablation on 1/10/23. Home regimen includes Coreg and Ramipril. Reported LVEF preop was 50%.     Postoperatively remains on dual inotropes and until yesterday on intermitted BiPAP. Today has improved after diuresis.     Echo postop is limited but shows LVEF~30% (global depression), relatively small LV cavity and very enlarged LA    Had Afib and CHB post-op with stable escape junctional rhythm no pacing required.    Cr normal, Hgb-11.3, Plts-129  CXR Improving congestion     On Mil 0.25 and  2.5, off vasopressors for <24h  BP ~100/60-70    - Continue diuresis per CTICU team  - Add Spironolactone 25 mg daily   - Defer vasodilators for now, tolerance to be determined given small LV cavity  - Hold BB for now   - Wean inotropes as tolerated   - Will follow with you  - Full echo this weekend when on single inotrope    Keaton Goncalves MD

## 2023-01-12 NOTE — PROGRESS NOTE ADULT - SUBJECTIVE AND OBJECTIVE BOX
CTICU  CRITICAL  CARE  attending     Hand off received 					   Pertinent clinical, laboratory, radiographic, hemodynamic, echocardiographic, respiratory data, microbiologic data and chart were reviewed and analyzed frequently throughout the course of the day and night    Patient seen and examined with CTS/ SH attending at bedside    Pt is a 72y , Male, post op day # 2 s/p MV repair ( for severe mitral regurgitation);  hx of non ischemic cardiomyopathy ( LVEF 20-25%)    post op:    low cardiac output state   multiple inotrope support  afib/RVR alternating with wenkebach     today:    cool/clammy  drop in UO  features of inadequate forward flow  TTE: r/out pericardial effusion  Bipap; on and off to off load work of breathing  rhythm alternating between sinus and wenkebach    Vasoactive infusions:    Primacor @ 0.25 mcgs  Dobutamine @ 5 mcgs    71 y/o M (former smoker), with PMHx of HTN,  atrial fibrillation (on coumadin), renal calculi, gout, hemochromatosis (managed by Dr. Tenzin Gomez), nonischemic cardiomyopathy (EF: 50-55% as per echo below), and severe mitral regurgitation, who presented to Dr. Atkins, for his valvular disease. Patient feels well overall, denies current symptoms. Patient denies CP, SOB, palpitations, orthopnea, LE edema, syncope, n/v, dizziness, diaphoresis, claudication, fever, chills, recent travel, or sick contacts.  ECHO 11/14/2022: Severely dilated LA. Severe MVR. Mild TR. Prolapse of the posterior mitral leaflet. Systolic flow reversal in the pulmonary veins, which is consistent with severe MR. EF: 50-55%.         FAMILY HISTORY:  FH: arthritis (Mother)    FH: glaucoma (Sibling)    FH: hypothyroidism (Sibling)    PAST MEDICAL & SURGICAL HISTORY:  Hypertension      Renal calculi      Gout      History of hemochromatosis      Nonischemic cardiomyopathy      Mitral regurgitation      History of cholecystectomy        Patient is a 72y old  Male who presents with  severe Mitral regurgitation    14 system review limited 2/2 post op morbidity    Vital signs, hemodynamic and respiratory parameters were reviewed from the bedside nursing flowsheet.  ICU Vital Signs Last 24 Hrs  T(C): 36.7 (12 Jan 2023 09:09), Max: 37.4 (11 Jan 2023 22:00)  T(F): 98 (12 Jan 2023 09:09), Max: 99.3 (11 Jan 2023 22:00)  HR: 87 (12 Jan 2023 11:29) (72 - 113)  BP: 114/69 (11 Jan 2023 23:00) (114/69 - 138/64)  BP(mean): 87 (11 Jan 2023 23:00) (85 - 87)  ABP: 106/55 (12 Jan 2023 08:00) (100/52 - 143/64)  ABP(mean): 84 (12 Jan 2023 11:00) (68 - 93)  RR: 21 (12 Jan 2023 11:29) (14 - 21)  SpO2: 100% (12 Jan 2023 11:29) (93% - 100%)    O2 Parameters below as of 12 Jan 2023 11:29  Patient On (Oxygen Delivery Method): BiPAP/CPAP    O2 Concentration (%): 50      Adult Advanced Hemodynamics Last 24 Hrs  CVP(mm Hg): 7 (12 Jan 2023 11:00) (3 - 18)  CVP(cm H2O): --  CO: 7.9 (12 Jan 2023 05:00) (6 - 7.9)  CI: 3.4 (12 Jan 2023 05:00) (2.6 - 3.4)  PA: 33/17 (12 Jan 2023 07:00) (26/13 - 42/22)  PA(mean): 23 (12 Jan 2023 07:00) (18 - 31)  PCWP: --  SVR: 819 (12 Jan 2023 05:00) (819 - 932)  SVRI: 1891 (12 Jan 2023 05:00) (1891 - 2152)  PVR: --  PVRI: --, ABG - ( 12 Jan 2023 10:20 )  pH, Arterial: 7.48  pH, Blood: x     /  pCO2: 32    /  pO2: 88    / HCO3: 24    / Base Excess: 0.9   /  SaO2: 98.0                Intake and output was reviewed and the fluid balance was calculated  Daily     Daily   I&O's Summary    11 Jan 2023 07:01  -  12 Jan 2023 07:00  --------------------------------------------------------  IN: 3683.9 mL / OUT: 3995 mL / NET: -311.1 mL    12 Jan 2023 07:01  -  12 Jan 2023 12:23  --------------------------------------------------------  IN: 112.8 mL / OUT: 335 mL / NET: -222.2 mL        All lines and drain sites were assessed  [facility]      POCT Blood Glucose.: 117 mg/dL (12 Jan 2023 07:33)    No acute change in mental status  Auscultation of the chest reveals equal bs  Abdomen is soft  Extremities are warm and well perfused  Wounds appear clean and unremarkable  Antibiotics are periop    labs  CBC Full  -  ( 12 Jan 2023 10:23 )  WBC Count : 13.76 K/uL  RBC Count : 3.64 M/uL  Hemoglobin : 11.4 g/dL  Hematocrit : 33.0 %  Platelet Count - Automated : 103 K/uL  Mean Cell Volume : 90.7 fl  Mean Cell Hemoglobin : 31.3 pg  Mean Cell Hemoglobin Concentration : 34.5 gm/dL  Auto Neutrophil # : x  Auto Lymphocyte # : x  Auto Monocyte # : x  Auto Eosinophil # : x  Auto Basophil # : x  Auto Neutrophil % : x  Auto Lymphocyte % : x  Auto Monocyte % : x  Auto Eosinophil % : x  Auto Basophil % : x    01-12    136  |  101  |  17  ----------------------------<  164<H>  4.0   |  27  |  0.83    Ca    8.7      12 Jan 2023 10:23  Phos  1.8     01-12  Mg     2.0     01-12    TPro  6.0  /  Alb  3.7  /  TBili  1.5<H>  /  DBili  x   /  AST  62<H>  /  ALT  22  /  AlkPhos  32<L>  01-12    PT/INR - ( 12 Jan 2023 10:23 )   PT: 18.6 sec;   INR: 1.56          PTT - ( 12 Jan 2023 10:23 )  PTT:21.2 sec  The current medications were reviewed   MEDICATIONS  (STANDING):  aspirin enteric coated 81 milliGRAM(s) Oral daily  chlorhexidine 2% Cloths 1 Application(s) Topical daily  dexMEDEtomidine Infusion 0.2 MICROgram(s)/kG/Hr (5.6 mL/Hr) IV Continuous <Continuous>  dexMEDEtomidine Infusion 0.1 MICROgram(s)/kG/Hr (2.8 mL/Hr) IV Continuous <Continuous>  dextrose 5%. 1000 milliLiter(s) (100 mL/Hr) IV Continuous <Continuous>  dextrose 5%. 1000 milliLiter(s) (50 mL/Hr) IV Continuous <Continuous>  dextrose 50% Injectable 25 Gram(s) IV Push once  dextrose 50% Injectable 12.5 Gram(s) IV Push once  dextrose 50% Injectable 25 Gram(s) IV Push once  DOBUTamine Infusion 5 MICROgram(s)/kG/Min (16.8 mL/Hr) IV Continuous <Continuous>  glucagon  Injectable 1 milliGRAM(s) IntraMuscular once  heparin   Injectable 5000 Unit(s) SubCutaneous every 8 hours  insulin lispro (ADMELOG) corrective regimen sliding scale   SubCutaneous Before meals and at bedtime  magnesium sulfate  IVPB 2 Gram(s) IV Intermittent once  milrinone Infusion 0.375 MICROgram(s)/kG/Min (12.6 mL/Hr) IV Continuous <Continuous>  pantoprazole    Tablet 40 milliGRAM(s) Oral before breakfast  polyethylene glycol 3350 17 Gram(s) Oral at bedtime  potassium phosphate IVPB 15 milliMole(s) IV Intermittent once  sodium chloride 0.9%. 1000 milliLiter(s) (10 mL/Hr) IV Continuous <Continuous>  vasopressin Infusion 0.02 Unit(s)/Min (3 mL/Hr) IV Continuous <Continuous>  vasopressin Infusion 0.033 Unit(s)/Min (4.95 mL/Hr) IV Continuous <Continuous>    MEDICATIONS  (PRN):  acetaminophen     Tablet .. 650 milliGRAM(s) Oral every 6 hours PRN Mild Pain (1 - 3)  dextrose Oral Gel 15 Gram(s) Oral once PRN Blood Glucose LESS THAN 70 milliGRAM(s)/deciliter  fentaNYL    Injectable 25 MICROGram(s) IV Push every 3 hours PRN Severe Pain (7 - 10)  oxyCODONE    IR 5 milliGRAM(s) Oral every 4 hours PRN Moderate Pain (4 - 6)  oxyCODONE    IR 10 milliGRAM(s) Oral every 6 hours PRN Severe Pain (7 - 10)       PROBLEM LIST/ ASSESSMENT:  HEALTH ISSUES - PROBLEM Dx:      ,   Patient is a 72y old  Male who presents with severe mitral regurgitation   s/p MV repair via sternotomy      My plan includes :    Titrate inotrope support to lactate levels; other metrics of end organ perfusion  Trend lactate levels  continue Bipap on and off to off load work of breathing  Titrate Fio2 to maintain Sao2 >95%  Serial ABGs  Diurese to maintain negative fluid balance  close hemodynamic, ventilatory and drain monitoring and management per post op routine    Monitor for arrhythmias and monitor parameters for organ perfusion  monitor neurologic status  Head of the bed should remain elevated to 45 deg .   chest PT and IS will be encouraged  monitor adequacy of oxygenation and ventilation and attempt to wean oxygen  Nutritional goals will be met using po eventually , ensure adequate caloric intake and montior the same  Stress ulcer and VTE prophylaxis will be achieved    Glycemic control is satisfactory  Electrolytes have been repleted as necessary and wound care has been carried out. Pain control has been achieved.   agressive physical therapy and early mobility and ambulation goals will be met   The family was updated about the course and plan  CRITICAL CARE TIME SPENT in evaluation and management, reassessments, review and interpretation of labs and x-rays, ventilator and hemodynamic management, formulating a plan and coordinating care: ___90____ MIN.  Time does not include procedural time.  CTICU ATTENDING     					    Napoleon Lima MD

## 2023-01-13 LAB
ALBUMIN SERPL ELPH-MCNC: 3.5 G/DL — SIGNIFICANT CHANGE UP (ref 3.3–5)
ALBUMIN SERPL ELPH-MCNC: 3.5 G/DL — SIGNIFICANT CHANGE UP (ref 3.3–5)
ALBUMIN SERPL ELPH-MCNC: 3.7 G/DL — SIGNIFICANT CHANGE UP (ref 3.3–5)
ALP SERPL-CCNC: 30 U/L — LOW (ref 40–120)
ALP SERPL-CCNC: 35 U/L — LOW (ref 40–120)
ALP SERPL-CCNC: 37 U/L — LOW (ref 40–120)
ALT FLD-CCNC: 16 U/L — SIGNIFICANT CHANGE UP (ref 10–45)
ANION GAP SERPL CALC-SCNC: 10 MMOL/L — SIGNIFICANT CHANGE UP (ref 5–17)
ANION GAP SERPL CALC-SCNC: 7 MMOL/L — SIGNIFICANT CHANGE UP (ref 5–17)
ANION GAP SERPL CALC-SCNC: 7 MMOL/L — SIGNIFICANT CHANGE UP (ref 5–17)
APTT BLD: 24.9 SEC — LOW (ref 27.5–35.5)
APTT BLD: 29.9 SEC — SIGNIFICANT CHANGE UP (ref 27.5–35.5)
AST SERPL-CCNC: 32 U/L — SIGNIFICANT CHANGE UP (ref 10–40)
AST SERPL-CCNC: 37 U/L — SIGNIFICANT CHANGE UP (ref 10–40)
AST SERPL-CCNC: 38 U/L — SIGNIFICANT CHANGE UP (ref 10–40)
BASE EXCESS BLDA CALC-SCNC: 3.4 MMOL/L — HIGH (ref -2–3)
BASE EXCESS BLDV CALC-SCNC: 4.8 MMOL/L — HIGH (ref -2–3)
BASE EXCESS BLDV CALC-SCNC: 4.8 MMOL/L — HIGH (ref -2–3)
BASE EXCESS BLDV CALC-SCNC: 5 MMOL/L — HIGH (ref -2–3)
BILIRUB SERPL-MCNC: 1.5 MG/DL — HIGH (ref 0.2–1.2)
BILIRUB SERPL-MCNC: 1.6 MG/DL — HIGH (ref 0.2–1.2)
BILIRUB SERPL-MCNC: 1.6 MG/DL — HIGH (ref 0.2–1.2)
BUN SERPL-MCNC: 14 MG/DL — SIGNIFICANT CHANGE UP (ref 7–23)
BUN SERPL-MCNC: 15 MG/DL — SIGNIFICANT CHANGE UP (ref 7–23)
BUN SERPL-MCNC: 17 MG/DL — SIGNIFICANT CHANGE UP (ref 7–23)
CA-I SERPL-SCNC: 1.1 MMOL/L — LOW (ref 1.15–1.33)
CA-I SERPL-SCNC: 1.16 MMOL/L — SIGNIFICANT CHANGE UP (ref 1.15–1.33)
CA-I SERPL-SCNC: 1.2 MMOL/L — SIGNIFICANT CHANGE UP (ref 1.15–1.33)
CALCIUM SERPL-MCNC: 8 MG/DL — LOW (ref 8.4–10.5)
CALCIUM SERPL-MCNC: 8.6 MG/DL — SIGNIFICANT CHANGE UP (ref 8.4–10.5)
CALCIUM SERPL-MCNC: 8.7 MG/DL — SIGNIFICANT CHANGE UP (ref 8.4–10.5)
CHLORIDE SERPL-SCNC: 100 MMOL/L — SIGNIFICANT CHANGE UP (ref 96–108)
CHLORIDE SERPL-SCNC: 101 MMOL/L — SIGNIFICANT CHANGE UP (ref 96–108)
CHLORIDE SERPL-SCNC: 102 MMOL/L — SIGNIFICANT CHANGE UP (ref 96–108)
CO2 BLDA-SCNC: 28 MMOL/L — HIGH (ref 19–24)
CO2 BLDV-SCNC: 31.9 MMOL/L — HIGH (ref 22–26)
CO2 BLDV-SCNC: 31.9 MMOL/L — HIGH (ref 22–26)
CO2 BLDV-SCNC: 32.5 MMOL/L — HIGH (ref 22–26)
CO2 SERPL-SCNC: 27 MMOL/L — SIGNIFICANT CHANGE UP (ref 22–31)
CO2 SERPL-SCNC: 27 MMOL/L — SIGNIFICANT CHANGE UP (ref 22–31)
CO2 SERPL-SCNC: 28 MMOL/L — SIGNIFICANT CHANGE UP (ref 22–31)
CREAT SERPL-MCNC: 0.77 MG/DL — SIGNIFICANT CHANGE UP (ref 0.5–1.3)
CREAT SERPL-MCNC: 0.82 MG/DL — SIGNIFICANT CHANGE UP (ref 0.5–1.3)
CREAT SERPL-MCNC: 0.82 MG/DL — SIGNIFICANT CHANGE UP (ref 0.5–1.3)
EGFR: 93 ML/MIN/1.73M2 — SIGNIFICANT CHANGE UP
EGFR: 93 ML/MIN/1.73M2 — SIGNIFICANT CHANGE UP
EGFR: 95 ML/MIN/1.73M2 — SIGNIFICANT CHANGE UP
GAS PNL BLDA: SIGNIFICANT CHANGE UP
GAS PNL BLDV: 132 MMOL/L — LOW (ref 136–145)
GAS PNL BLDV: 134 MMOL/L — LOW (ref 136–145)
GAS PNL BLDV: 134 MMOL/L — LOW (ref 136–145)
GAS PNL BLDV: SIGNIFICANT CHANGE UP
GLUCOSE BLDC GLUCOMTR-MCNC: 101 MG/DL — HIGH (ref 70–99)
GLUCOSE BLDC GLUCOMTR-MCNC: 107 MG/DL — HIGH (ref 70–99)
GLUCOSE BLDC GLUCOMTR-MCNC: 112 MG/DL — HIGH (ref 70–99)
GLUCOSE BLDC GLUCOMTR-MCNC: 114 MG/DL — HIGH (ref 70–99)
GLUCOSE SERPL-MCNC: 112 MG/DL — HIGH (ref 70–99)
GLUCOSE SERPL-MCNC: 118 MG/DL — HIGH (ref 70–99)
GLUCOSE SERPL-MCNC: 128 MG/DL — HIGH (ref 70–99)
HCO3 BLDA-SCNC: 27 MMOL/L — SIGNIFICANT CHANGE UP (ref 21–28)
HCO3 BLDV-SCNC: 30 MMOL/L — HIGH (ref 22–29)
HCO3 BLDV-SCNC: 30 MMOL/L — HIGH (ref 22–29)
HCO3 BLDV-SCNC: 31 MMOL/L — HIGH (ref 22–29)
HCT VFR BLD CALC: 31.5 % — LOW (ref 39–50)
HCT VFR BLD CALC: 33.6 % — LOW (ref 39–50)
HCT VFR BLD CALC: 33.8 % — LOW (ref 39–50)
HGB BLD-MCNC: 10.8 G/DL — LOW (ref 13–17)
HGB BLD-MCNC: 11.3 G/DL — LOW (ref 13–17)
HGB BLD-MCNC: 11.4 G/DL — LOW (ref 13–17)
INR BLD: 1.32 — HIGH (ref 0.88–1.16)
INR BLD: 1.36 — HIGH (ref 0.88–1.16)
INR BLD: 1.43 — HIGH (ref 0.88–1.16)
ISTAT INR: 1.2 — HIGH (ref 0.88–1.16)
ISTAT PT: 14.2 SEC — HIGH (ref 10–12.9)
LACTATE SERPL-SCNC: 0.8 MMOL/L — SIGNIFICANT CHANGE UP (ref 0.5–2)
LACTATE SERPL-SCNC: 0.9 MMOL/L — SIGNIFICANT CHANGE UP (ref 0.5–2)
LACTATE SERPL-SCNC: 1 MMOL/L — SIGNIFICANT CHANGE UP (ref 0.5–2)
MAGNESIUM SERPL-MCNC: 1.8 MG/DL — SIGNIFICANT CHANGE UP (ref 1.6–2.6)
MAGNESIUM SERPL-MCNC: 1.9 MG/DL — SIGNIFICANT CHANGE UP (ref 1.6–2.6)
MAGNESIUM SERPL-MCNC: 2 MG/DL — SIGNIFICANT CHANGE UP (ref 1.6–2.6)
MCHC RBC-ENTMCNC: 30.8 PG — SIGNIFICANT CHANGE UP (ref 27–34)
MCHC RBC-ENTMCNC: 31 PG — SIGNIFICANT CHANGE UP (ref 27–34)
MCHC RBC-ENTMCNC: 31.1 PG — SIGNIFICANT CHANGE UP (ref 27–34)
MCHC RBC-ENTMCNC: 33.6 GM/DL — SIGNIFICANT CHANGE UP (ref 32–36)
MCHC RBC-ENTMCNC: 33.7 GM/DL — SIGNIFICANT CHANGE UP (ref 32–36)
MCHC RBC-ENTMCNC: 34.3 GM/DL — SIGNIFICANT CHANGE UP (ref 32–36)
MCV RBC AUTO: 90.8 FL — SIGNIFICANT CHANGE UP (ref 80–100)
MCV RBC AUTO: 91.4 FL — SIGNIFICANT CHANGE UP (ref 80–100)
MCV RBC AUTO: 92.3 FL — SIGNIFICANT CHANGE UP (ref 80–100)
NRBC # BLD: 0 /100 WBCS — SIGNIFICANT CHANGE UP (ref 0–0)
PCO2 BLDA: 36 MMHG — SIGNIFICANT CHANGE UP (ref 35–48)
PCO2 BLDV: 48 MMHG — SIGNIFICANT CHANGE UP (ref 42–55)
PCO2 BLDV: 48 MMHG — SIGNIFICANT CHANGE UP (ref 42–55)
PCO2 BLDV: 50 MMHG — SIGNIFICANT CHANGE UP (ref 42–55)
PH BLDA: 7.48 — HIGH (ref 7.35–7.45)
PH BLDV: 7.4 — SIGNIFICANT CHANGE UP (ref 7.32–7.43)
PH BLDV: 7.41 — SIGNIFICANT CHANGE UP (ref 7.32–7.43)
PH BLDV: 7.41 — SIGNIFICANT CHANGE UP (ref 7.32–7.43)
PHOSPHATE SERPL-MCNC: 1.9 MG/DL — LOW (ref 2.5–4.5)
PHOSPHATE SERPL-MCNC: 2.2 MG/DL — LOW (ref 2.5–4.5)
PHOSPHATE SERPL-MCNC: 2.8 MG/DL — SIGNIFICANT CHANGE UP (ref 2.5–4.5)
PLATELET # BLD AUTO: 111 K/UL — LOW (ref 150–400)
PLATELET # BLD AUTO: 125 K/UL — LOW (ref 150–400)
PLATELET # BLD AUTO: 129 K/UL — LOW (ref 150–400)
PO2 BLDA: 148 MMHG — HIGH (ref 83–108)
PO2 BLDV: 35 MMHG — SIGNIFICANT CHANGE UP (ref 25–45)
PO2 BLDV: <33 MMHG — SIGNIFICANT CHANGE UP (ref 25–45)
PO2 BLDV: <33 MMHG — SIGNIFICANT CHANGE UP (ref 25–45)
POCT ISTAT CREATININE: 1.1 MG/DL — SIGNIFICANT CHANGE UP (ref 0.5–1.3)
POTASSIUM BLDV-SCNC: 4 MMOL/L — SIGNIFICANT CHANGE UP (ref 3.5–5.1)
POTASSIUM BLDV-SCNC: 4.3 MMOL/L — SIGNIFICANT CHANGE UP (ref 3.5–5.1)
POTASSIUM BLDV-SCNC: 4.4 MMOL/L — SIGNIFICANT CHANGE UP (ref 3.5–5.1)
POTASSIUM SERPL-MCNC: 4 MMOL/L — SIGNIFICANT CHANGE UP (ref 3.5–5.3)
POTASSIUM SERPL-MCNC: 4.2 MMOL/L — SIGNIFICANT CHANGE UP (ref 3.5–5.3)
POTASSIUM SERPL-MCNC: 4.2 MMOL/L — SIGNIFICANT CHANGE UP (ref 3.5–5.3)
POTASSIUM SERPL-SCNC: 4 MMOL/L — SIGNIFICANT CHANGE UP (ref 3.5–5.3)
POTASSIUM SERPL-SCNC: 4.2 MMOL/L — SIGNIFICANT CHANGE UP (ref 3.5–5.3)
POTASSIUM SERPL-SCNC: 4.2 MMOL/L — SIGNIFICANT CHANGE UP (ref 3.5–5.3)
PROT SERPL-MCNC: 5.8 G/DL — LOW (ref 6–8.3)
PROT SERPL-MCNC: 6.3 G/DL — SIGNIFICANT CHANGE UP (ref 6–8.3)
PROT SERPL-MCNC: 6.4 G/DL — SIGNIFICANT CHANGE UP (ref 6–8.3)
PROTHROM AB SERPL-ACNC: 15.7 SEC — HIGH (ref 10.5–13.4)
PROTHROM AB SERPL-ACNC: 16.2 SEC — HIGH (ref 10.5–13.4)
PROTHROM AB SERPL-ACNC: 17.1 SEC — HIGH (ref 10.5–13.4)
RBC # BLD: 3.47 M/UL — LOW (ref 4.2–5.8)
RBC # BLD: 3.64 M/UL — LOW (ref 4.2–5.8)
RBC # BLD: 3.7 M/UL — LOW (ref 4.2–5.8)
RBC # FLD: 13.5 % — SIGNIFICANT CHANGE UP (ref 10.3–14.5)
RBC # FLD: 13.5 % — SIGNIFICANT CHANGE UP (ref 10.3–14.5)
RBC # FLD: 13.6 % — SIGNIFICANT CHANGE UP (ref 10.3–14.5)
SAO2 % BLDA: 99.2 % — HIGH (ref 94–98)
SAO2 % BLDV: 50.4 % — LOW (ref 67–88)
SAO2 % BLDV: 51.2 % — LOW (ref 67–88)
SAO2 % BLDV: 64.3 % — LOW (ref 67–88)
SODIUM SERPL-SCNC: 136 MMOL/L — SIGNIFICANT CHANGE UP (ref 135–145)
SODIUM SERPL-SCNC: 136 MMOL/L — SIGNIFICANT CHANGE UP (ref 135–145)
SODIUM SERPL-SCNC: 137 MMOL/L — SIGNIFICANT CHANGE UP (ref 135–145)
WBC # BLD: 11.46 K/UL — HIGH (ref 3.8–10.5)
WBC # BLD: 11.8 K/UL — HIGH (ref 3.8–10.5)
WBC # BLD: 12.15 K/UL — HIGH (ref 3.8–10.5)
WBC # FLD AUTO: 11.46 K/UL — HIGH (ref 3.8–10.5)
WBC # FLD AUTO: 11.8 K/UL — HIGH (ref 3.8–10.5)
WBC # FLD AUTO: 12.15 K/UL — HIGH (ref 3.8–10.5)

## 2023-01-13 PROCEDURE — 99291 CRITICAL CARE FIRST HOUR: CPT

## 2023-01-13 PROCEDURE — 71045 X-RAY EXAM CHEST 1 VIEW: CPT | Mod: 26

## 2023-01-13 PROCEDURE — 99233 SBSQ HOSP IP/OBS HIGH 50: CPT

## 2023-01-13 PROCEDURE — 99292 CRITICAL CARE ADDL 30 MIN: CPT

## 2023-01-13 PROCEDURE — 99232 SBSQ HOSP IP/OBS MODERATE 35: CPT

## 2023-01-13 RX ORDER — FUROSEMIDE 40 MG
20 TABLET ORAL ONCE
Refills: 0 | Status: COMPLETED | OUTPATIENT
Start: 2023-01-13 | End: 2023-01-13

## 2023-01-13 RX ORDER — HEPARIN SODIUM 5000 [USP'U]/ML
500 INJECTION INTRAVENOUS; SUBCUTANEOUS
Qty: 25000 | Refills: 0 | Status: DISCONTINUED | OUTPATIENT
Start: 2023-01-13 | End: 2023-01-15

## 2023-01-13 RX ORDER — CALCIUM GLUCONATE 100 MG/ML
2 VIAL (ML) INTRAVENOUS ONCE
Refills: 0 | Status: COMPLETED | OUTPATIENT
Start: 2023-01-13 | End: 2023-01-13

## 2023-01-13 RX ORDER — POTASSIUM PHOSPHATE, MONOBASIC POTASSIUM PHOSPHATE, DIBASIC 236; 224 MG/ML; MG/ML
15 INJECTION, SOLUTION INTRAVENOUS ONCE
Refills: 0 | Status: COMPLETED | OUTPATIENT
Start: 2023-01-13 | End: 2023-01-13

## 2023-01-13 RX ORDER — MAGNESIUM SULFATE 500 MG/ML
2 VIAL (ML) INJECTION ONCE
Refills: 0 | Status: COMPLETED | OUTPATIENT
Start: 2023-01-13 | End: 2023-01-13

## 2023-01-13 RX ORDER — ACETAMINOPHEN 500 MG
1000 TABLET ORAL ONCE
Refills: 0 | Status: COMPLETED | OUTPATIENT
Start: 2023-01-13 | End: 2023-01-13

## 2023-01-13 RX ORDER — SIMETHICONE 80 MG/1
80 TABLET, CHEWABLE ORAL EVERY 6 HOURS
Refills: 0 | Status: DISCONTINUED | OUTPATIENT
Start: 2023-01-13 | End: 2023-01-18

## 2023-01-13 RX ADMIN — Medication 25 GRAM(S): at 19:37

## 2023-01-13 RX ADMIN — POLYETHYLENE GLYCOL 3350 17 GRAM(S): 17 POWDER, FOR SOLUTION ORAL at 05:27

## 2023-01-13 RX ADMIN — POTASSIUM PHOSPHATE, MONOBASIC POTASSIUM PHOSPHATE, DIBASIC 62.5 MILLIMOLE(S): 236; 224 INJECTION, SOLUTION INTRAVENOUS at 16:04

## 2023-01-13 RX ADMIN — Medication 20 MILLIGRAM(S): at 05:26

## 2023-01-13 RX ADMIN — OXYCODONE HYDROCHLORIDE 5 MILLIGRAM(S): 5 TABLET ORAL at 20:00

## 2023-01-13 RX ADMIN — Medication 200 GRAM(S): at 11:56

## 2023-01-13 RX ADMIN — Medication 25 GRAM(S): at 05:27

## 2023-01-13 RX ADMIN — CHLORHEXIDINE GLUCONATE 1 APPLICATION(S): 213 SOLUTION TOPICAL at 11:54

## 2023-01-13 RX ADMIN — Medication 16.8 MICROGRAM(S)/KG/MIN: at 07:37

## 2023-01-13 RX ADMIN — HEPARIN SODIUM 5000 UNIT(S): 5000 INJECTION INTRAVENOUS; SUBCUTANEOUS at 05:26

## 2023-01-13 RX ADMIN — POLYETHYLENE GLYCOL 3350 17 GRAM(S): 17 POWDER, FOR SOLUTION ORAL at 21:57

## 2023-01-13 RX ADMIN — POTASSIUM PHOSPHATE, MONOBASIC POTASSIUM PHOSPHATE, DIBASIC 83.33 MILLIMOLE(S): 236; 224 INJECTION, SOLUTION INTRAVENOUS at 00:55

## 2023-01-13 RX ADMIN — SIMETHICONE 80 MILLIGRAM(S): 80 TABLET, CHEWABLE ORAL at 16:53

## 2023-01-13 RX ADMIN — OXYCODONE HYDROCHLORIDE 5 MILLIGRAM(S): 5 TABLET ORAL at 09:53

## 2023-01-13 RX ADMIN — PANTOPRAZOLE SODIUM 40 MILLIGRAM(S): 20 TABLET, DELAYED RELEASE ORAL at 06:51

## 2023-01-13 RX ADMIN — MILRINONE LACTATE 12.6 MICROGRAM(S)/KG/MIN: 1 INJECTION, SOLUTION INTRAVENOUS at 15:51

## 2023-01-13 RX ADMIN — Medication 1000 MILLIGRAM(S): at 05:00

## 2023-01-13 RX ADMIN — Medication 25 GRAM(S): at 00:35

## 2023-01-13 RX ADMIN — Medication 20 MILLIGRAM(S): at 21:57

## 2023-01-13 RX ADMIN — Medication 400 MILLIGRAM(S): at 04:30

## 2023-01-13 RX ADMIN — Medication 81 MILLIGRAM(S): at 11:56

## 2023-01-13 RX ADMIN — Medication 20 MILLIGRAM(S): at 17:07

## 2023-01-13 RX ADMIN — OXYCODONE HYDROCHLORIDE 5 MILLIGRAM(S): 5 TABLET ORAL at 19:06

## 2023-01-13 RX ADMIN — HEPARIN SODIUM 5 UNIT(S)/HR: 5000 INJECTION INTRAVENOUS; SUBCUTANEOUS at 11:17

## 2023-01-13 NOTE — PHYSICAL THERAPY INITIAL EVALUATION ADULT - GENERAL OBSERVATIONS, REHAB EVAL
Spoke with JB Dangelo who cleared for OOB. Patient received sitting in chair in NAD with +ECG +central line +mcmahon +rukhsana +TPM +SCDs x 2 +6L O2 NC

## 2023-01-13 NOTE — PROGRESS NOTE ADULT - SUBJECTIVE AND OBJECTIVE BOX
EPS Progress Note  CC: severe MR    S: OOB walking with PT this morning.     O: T(C): 36.6 (01-13-23 @ 08:41), Max: 37.3 (01-12-23 @ 21:03)  HR: 83 (01-13-23 @ 10:00) (82 - 114)  BP: 120/60  RR: 18 (01-13-23 @ 10:00) (18 - 24)  SpO2: 98% (01-13-23 @ 10:00) (83% - 100%)    TELE:  this morning rhythm is consistent with sinus tach (P wave buried in the T wave). HR ~100s.     PHYSICAL  Pulm:  diminished.   Cardiac:   + s1/s2, regular.   GI:  +BS , soft  Vascular: No LE edema  Neuro: AAO x 3      LABS:                        10.8   11.46 )-----------( 111      ( 13 Jan 2023 04:05 )             31.5     01-13    136  |  102  |  15  ----------------------------<  112<H>  4.2   |  27  |  0.82    Ca    8.0<L>      13 Jan 2023 04:05  Phos  2.8     01-13  Mg     1.8     01-13    TPro  5.8<L>  /  Alb  3.5  /  TBili  1.6<H>  /  DBili  x   /  AST  38  /  ALT  16  /  AlkPhos  30<L>  01-13    PT/INR - ( 13 Jan 2023 04:05 )   PT: 17.1 sec;   INR: 1.43          PTT - ( 13 Jan 2023 04:05 )  PTT:29.9 sec    MEDICATIONS:  acetaminophen     Tablet .. 650 milliGRAM(s) Oral every 6 hours PRN  aspirin enteric coated 81 milliGRAM(s) Oral daily  calcium gluconate IVPB 2 Gram(s) IV Intermittent once  chlorhexidine 2% Cloths 1 Application(s) Topical daily  dexMEDEtomidine Infusion 0.2 MICROgram(s)/kG/Hr IV Continuous <Continuous>  DOBUTamine Infusion 5 MICROgram(s)/kG/Min IV Continuous <Continuous>  fentaNYL    Injectable 25 MICROGram(s) IV Push every 3 hours PRN  glucagon  Injectable 1 milliGRAM(s) IntraMuscular once  heparin  Infusion 500 Unit(s)/Hr IV Continuous <Continuous>  insulin lispro (ADMELOG) corrective regimen sliding scale   SubCutaneous Before meals and at bedtime  milrinone Infusion 0.375 MICROgram(s)/kG/Min IV Continuous <Continuous>  oxyCODONE    IR 5 milliGRAM(s) Oral every 4 hours PRN  oxyCODONE    IR 10 milliGRAM(s) Oral every 6 hours PRN  pantoprazole    Tablet 40 milliGRAM(s) Oral before breakfast  polyethylene glycol 3350 17 Gram(s) Oral at bedtime  sodium chloride 0.9%. 1000 milliLiter(s) IV Continuous <Continuous>

## 2023-01-13 NOTE — PHYSICAL THERAPY INITIAL EVALUATION ADULT - PERTINENT HX OF CURRENT PROBLEM, REHAB EVAL
Patient is a 72 year old male now presents for cardiac catheterization to r/o ischemic disease and will be admitted post procedure to [facility] to undergo surgery  (mitral valve repair/replacement via sternotomy and CHERI occlusion with atrialclip/cryomaze ablation) ,with Dr. Atkins.

## 2023-01-13 NOTE — PROGRESS NOTE ADULT - SUBJECTIVE AND OBJECTIVE BOX
Heart Failure Consult Note    INTERVAL EVENTS: Improving respiratory status, weaned from bipap to HFNC-> NC. No current complaints.  TELEMETRY: Afib w/ PVCs    PAST MEDICAL & SURGICAL HISTORY:  Hypertension    Renal calculi    Gout    History of hemochromatosis    Nonischemic cardiomyopathy    Mitral regurgitation    History of cholecystectomy        MEDICATIONS  (STANDING):  aspirin enteric coated 81 milliGRAM(s) Oral daily  chlorhexidine 2% Cloths 1 Application(s) Topical daily  dexMEDEtomidine Infusion 0.2 MICROgram(s)/kG/Hr (5.6 mL/Hr) IV Continuous <Continuous>  dextrose 5%. 1000 milliLiter(s) (100 mL/Hr) IV Continuous <Continuous>  dextrose 5%. 1000 milliLiter(s) (50 mL/Hr) IV Continuous <Continuous>  dextrose 50% Injectable 25 Gram(s) IV Push once  dextrose 50% Injectable 12.5 Gram(s) IV Push once  dextrose 50% Injectable 25 Gram(s) IV Push once  DOBUTamine Infusion 5 MICROgram(s)/kG/Min (16.8 mL/Hr) IV Continuous <Continuous>  furosemide   Injectable 20 milliGRAM(s) IV Push once  furosemide   Injectable 20 milliGRAM(s) IV Push once  glucagon  Injectable 1 milliGRAM(s) IntraMuscular once  heparin  Infusion 500 Unit(s)/Hr (5 mL/Hr) IV Continuous <Continuous>  insulin lispro (ADMELOG) corrective regimen sliding scale   SubCutaneous Before meals and at bedtime  milrinone Infusion 0.375 MICROgram(s)/kG/Min (12.6 mL/Hr) IV Continuous <Continuous>  pantoprazole    Tablet 40 milliGRAM(s) Oral before breakfast  polyethylene glycol 3350 17 Gram(s) Oral at bedtime  sodium chloride 0.9%. 1000 milliLiter(s) (10 mL/Hr) IV Continuous <Continuous>    MEDICATIONS  (PRN):  acetaminophen     Tablet .. 650 milliGRAM(s) Oral every 6 hours PRN Mild Pain (1 - 3)  dextrose Oral Gel 15 Gram(s) Oral once PRN Blood Glucose LESS THAN 70 milliGRAM(s)/deciliter  fentaNYL    Injectable 25 MICROGram(s) IV Push every 3 hours PRN Severe Pain (7 - 10)  oxyCODONE    IR 5 milliGRAM(s) Oral every 4 hours PRN Moderate Pain (4 - 6)  oxyCODONE    IR 10 milliGRAM(s) Oral every 6 hours PRN Severe Pain (7 - 10)  simethicone 80 milliGRAM(s) Chew every 6 hours PRN Gas    Vital Signs Last 24 Hrs  T(C): 36.8 (13 Jan 2023 14:32), Max: 37.3 (12 Jan 2023 21:03)  T(F): 98.3 (13 Jan 2023 14:32), Max: 99.2 (12 Jan 2023 21:03)  HR: 90 (13 Jan 2023 17:00) (82 - 114)  BP: --  BP(mean): --  RR: 18 (13 Jan 2023 17:00) (15 - 24)  SpO2: 100% (13 Jan 2023 17:00) (95% - 100%)    Parameters below as of 13 Jan 2023 17:00  Patient On (Oxygen Delivery Method): nasal cannula w/ humidification  O2 Flow (L/min): 4      PHYSICAL EXAM:  GEN: Awake, alert. NAD.   HEENT: JVP normal  RESP: CTA b/l  CV: Irregularly irregular  EXT: Warm. No edema  NEURO: AAOx3. No focal deficits.     LABS:                        11.4   12.15 )-----------( 125      ( 13 Jan 2023 10:39 )             33.8     01-13    137  |  100  |  14  ----------------------------<  128<H>  4.0   |  27  |  0.77    Ca    8.7      13 Jan 2023 10:39  Phos  1.9     01-13  Mg     2.0     01-13    TPro  6.3  /  Alb  3.5  /  TBili  1.6<H>  /  DBili  x   /  AST  37  /  ALT  16  /  AlkPhos  37<L>  01-13        PT/INR - ( 13 Jan 2023 16:48 )   PT: 16.2 sec;   INR: 1.36          PTT - ( 13 Jan 2023 10:39 )  PTT:24.9 sec    I&O's Summary    12 Jan 2023 07:01  -  13 Jan 2023 07:00  --------------------------------------------------------  IN: 1749.7 mL / OUT: 3240 mL / NET: -1490.3 mL    13 Jan 2023 07:01  -  13 Jan 2023 17:06  --------------------------------------------------------  IN: 818.5 mL / OUT: 1710 mL / NET: -891.5 mL      RADIOLOGY & ADDITIONAL STUDIES:    EKG:

## 2023-01-13 NOTE — PROGRESS NOTE ADULT - ASSESSMENT
73 y/o overweight M with history of HTN, atrial fibrillation (on coumadin), renal calculi, gout, hemochromatosis, nonischemic cardiomyopathy (EF: 50-55% on echo in 11/2022), and severe mitral regurgitation.  s/p MV repair, MAZE, AtriClip on 1/10/23.  Intraop LVEF 25-30%.  POD1 pt was in/out of AFIB.  POD 2 with AV dissociation but has a stable accelerated junctional rhythm of 80s.  Not required to use backup epicardial pacing.  Today, rhythm appears to be sinus tachy with P buried in the T wave.  No intervention required at this time. Will continue to monitor.

## 2023-01-13 NOTE — PHYSICAL THERAPY INITIAL EVALUATION ADULT - ADDITIONAL COMMENTS
Patient reports he lives with his wife in private home with 2 TEQUILA. PTA patient was independent with all mobility and ALDs

## 2023-01-13 NOTE — PROGRESS NOTE ADULT - ASSESSMENT
ASSESSMENT:  72M Former smoker, Hx of hemochromatosis, HTN, AFib (on coumadin), NICM with severe MR presented for elective mitral valve repair of primary severe MR with prolapse of posterior leaflet. Advanced HF consulted for further post op management.     Cardiac Studies:  [facility] (1/9/23): No significant coronary disease  CAR[facility] (11/14/22): LVEF 50-55%, severely dilated LA, severe MR prolapse of posterior leaflet w/ PV flow systolic flow reversal, mitral regurgitation jet is directed toward septum, no LA/CHERI thrombus      PLAN:  Nonischemic Cardiomyopathy  Etiology: NICM, LVEF borderline 50-55% preop and noted reduction in EF post op  GDMT: Add spironolactone 25mg  Diuretic: Continue PRN diuresis per primary team  - Continue current management per primary team and will further re-evaluate when weaned off gtts.       Discussed with Dr. Blanchard, HF attending.

## 2023-01-13 NOTE — PROGRESS NOTE ADULT - SUBJECTIVE AND OBJECTIVE BOX
CTICU  CRITICAL  CARE  attending     Hand off received 					   Pertinent clinical, laboratory, radiographic, hemodynamic, echocardiographic, respiratory data, microbiologic data and chart were reviewed and analyzed frequently throughout the course of the day and night    72 years old  male (former smoker), with HTN,  atrial fibrillation (on coumadin), renal calculi, gout, hemochromatosis (managed by Dr. Tenzin Gomez), nonischemic cardiomyopathy.  He was evaluated for CHF.  ECHO: Severe mitral regurgitation. Prolapse of the posterior mitral leaflet. Systolic flow reversal in the pulmonary veins. Severely dilated Left atrium.   He presented to Dr. Atkins, for his valvular disease.   S/P Mitral valve repair.  S/P cryo maze  S/P Occlusion of LA appendage.      FAMILY HISTORY:  FH: arthritis (Mother)    FH: glaucoma (Sibling)    FH: hypothyroidism (Sibling)    PAST MEDICAL & SURGICAL HISTORY:  Hypertension  Renal calculi  Gout  History of hemochromatosis  Nonischemic cardiomyopathy  Mitral regurgitation  History of cholecystectomy          14 system review was unremarkable    Vital signs, hemodynamic and respiratory parameters were reviewed from the bedside nursing flow sheet.  ICU Vital Signs Last 24 Hrs  T(C): 36.6 (13 Jan 2023 21:05), Max: 36.8 (13 Jan 2023 14:32)  T(F): 97.9 (13 Jan 2023 21:05), Max: 98.3 (13 Jan 2023 14:32)  HR: 83 (13 Jan 2023 21:39) (82 - 114)  BP: --  BP(mean): --  ABP: 131/71 (13 Jan 2023 21:00) (103/66 - 141/68)  ABP(mean): 93 (13 Jan 2023 21:00) (69 - 97)  RR: 20 (13 Jan 2023 21:00) (15 - 24)  SpO2: 100% (13 Jan 2023 21:39) (94% - 100%)    O2 Parameters below as of 13 Jan 2023 21:39  Patient On (Oxygen Delivery Method): BiPAP/CPAP    O2 Concentration (%): 50      Adult Advanced Hemodynamics Last 24 Hrs  CVP(mm Hg): 12 (13 Jan 2023 21:00) (2 - 16)  CVP(cm H2O): --  CO: 5.1 (13 Jan 2023 21:00) (5.1 - 6.3)  CI: 2.2 (13 Jan 2023 21:00) (2.2 - 2.7)  PA: --  PA(mean): --  PCWP: --  SVR: 1269 (13 Jan 2023 21:00) (913 - 1269)  SVRI: 2941 (13 Jan 2023 21:00) (2130 - 2941)  PVR: --  PVRI: --, ABG - ( 13 Jan 2023 16:35 )  pH, Arterial: 7.46  pH, Blood: x     /  pCO2: 38    /  pO2: 110   / HCO3: 27    / Base Excess: 3.1   /  SaO2: 98.9                Intake and output was reviewed and the fluid balance was calculated  Daily     Daily   I&O's Summary    12 Jan 2023 07:01  -  13 Jan 2023 07:00  --------------------------------------------------------  IN: 1749.7 mL / OUT: 3240 mL / NET: -1490.3 mL    13 Jan 2023 07:01  -  13 Jan 2023 21:44  --------------------------------------------------------  IN: 1303.6 mL / OUT: 2725 mL / NET: -1421.4 mL        All lines and drain sites were assessed    Neuro: No focal motor deficit.  Neck: No JVD.  CVS: S1, S2, No S3.  Lungs: Good air entry bilaterally.  Abd: Soft. No tenderness. + Bowel sounds.  Vascular: + DP/PT.  Extremities: No edema.  Lymphatic: Normal.  Skin: No abnormalities.      labs  CBC Full  -  ( 13 Jan 2023 16:48 )  WBC Count : 11.80 K/uL  RBC Count : 3.64 M/uL  Hemoglobin : 11.3 g/dL  Hematocrit : 33.6 %  Platelet Count - Automated : 129 K/uL  Mean Cell Volume : 92.3 fl  Mean Cell Hemoglobin : 31.0 pg  Mean Cell Hemoglobin Concentration : 33.6 gm/dL  Auto Neutrophil # : x  Auto Lymphocyte # : x  Auto Monocyte # : x  Auto Eosinophil # : x  Auto Basophil # : x  Auto Neutrophil % : x  Auto Lymphocyte % : x  Auto Monocyte % : x  Auto Eosinophil % : x  Auto Basophil % : x    01-13    136  |  101  |  17  ----------------------------<  118<H>  4.2   |  28  |  0.82    Ca    8.6      13 Jan 2023 16:48  Phos  2.2     01-13  Mg     1.9     01-13    TPro  6.4  /  Alb  3.7  /  TBili  1.5<H>  /  DBili  x   /  AST  32  /  ALT  16  /  AlkPhos  35<L>  01-13    PT/INR - ( 13 Jan 2023 16:48 )   PT: 16.2 sec;   INR: 1.36          PTT - ( 13 Jan 2023 10:39 )  PTT:24.9 sec  The current medications were reviewed   MEDICATIONS  (STANDING):  aspirin enteric coated 81 milliGRAM(s) Oral daily  chlorhexidine 2% Cloths 1 Application(s) Topical daily  dexMEDEtomidine Infusion 0.2 MICROgram(s)/kG/Hr (5.6 mL/Hr) IV Continuous <Continuous>  dextrose 5%. 1000 milliLiter(s) (50 mL/Hr) IV Continuous <Continuous>  dextrose 5%. 1000 milliLiter(s) (100 mL/Hr) IV Continuous <Continuous>  dextrose 50% Injectable 25 Gram(s) IV Push once  dextrose 50% Injectable 12.5 Gram(s) IV Push once  dextrose 50% Injectable 25 Gram(s) IV Push once  DOBUTamine Infusion 5 MICROgram(s)/kG/Min (16.8 mL/Hr) IV Continuous <Continuous>  furosemide   Injectable 20 milliGRAM(s) IV Push once  glucagon  Injectable 1 milliGRAM(s) IntraMuscular once  heparin  Infusion 500 Unit(s)/Hr (5 mL/Hr) IV Continuous <Continuous>  insulin lispro (ADMELOG) corrective regimen sliding scale   SubCutaneous Before meals and at bedtime  milrinone Infusion 0.375 MICROgram(s)/kG/Min (12.6 mL/Hr) IV Continuous <Continuous>  pantoprazole    Tablet 40 milliGRAM(s) Oral before breakfast  polyethylene glycol 3350 17 Gram(s) Oral at bedtime  sodium chloride 0.9%. 1000 milliLiter(s) (10 mL/Hr) IV Continuous <Continuous>    MEDICATIONS  (PRN):  acetaminophen     Tablet .. 650 milliGRAM(s) Oral every 6 hours PRN Mild Pain (1 - 3)  dextrose Oral Gel 15 Gram(s) Oral once PRN Blood Glucose LESS THAN 70 milliGRAM(s)/deciliter  fentaNYL    Injectable 25 MICROGram(s) IV Push every 3 hours PRN Severe Pain (7 - 10)  oxyCODONE    IR 5 milliGRAM(s) Oral every 4 hours PRN Moderate Pain (4 - 6)  oxyCODONE    IR 10 milliGRAM(s) Oral every 6 hours PRN Severe Pain (7 - 10)  simethicone 80 milliGRAM(s) Chew every 6 hours PRN Gas            72 years old  Male with severe mitral regurgitation in the setting of severely compromised left ventricular function.  S/P mitral valve repair.  S/P Cryoablation.   Hemodynamically stable.  Good oxygenation.  Fair urine out put.        My plan includes :  Continue low dose IV milrinone and dobutamine.  Heart Failure Rx.  BiPaP overnight.  Aggressive pulmonary toilet and bronchodilator Rx.   Close hemodynamic and drain monitoring and management  Monitor for arrhythmias and monitor parameters for organ perfusion  Monitor neurologic status  Monitor renal function.  Head of the bed should remain elevated to 45 deg .   Chest PT and IS will be encouraged  Monitor adequacy of oxygenation and ventilation and attempt to wean oxygen  Nutritional goals will be met using po eventually , ensure adequate caloric intake and monitor the same  Stress ulcer and VTE prophylaxis will be achieved    Glycemic control is satisfactory  Electrolytes have been repleted as necessary and wound care has been carried out. Pain control has been achieved.   Aggressive physical therapy and early mobility and ambulation goals will be met   The family was updated about the course and plan  CRITICAL CARE TIME SPENT in evaluation and management, reassessments, review and interpretation of labs and x-rays, ventilator and hemodynamic management, formulating a plan and coordinating care: ___30____ MIN.  Time does not include procedural time.  CTICU ATTENDING     					    Kamari Torres MD

## 2023-01-13 NOTE — PROGRESS NOTE ADULT - SUBJECTIVE AND OBJECTIVE BOX
INTERVAL COURSE   POD # 3  MV repair/ CHERI Occ-Cryomaze   Extubated un eventfully  Remained on  5 and Primacor 0.25/ [place] dc'd   Neg FB 1 L/ Good cardiac indices on FloTrack , Normal Lactate and LFTs   OOB in the chair, NAD on 6 L NC  Complaining of gas pain     VITALS  Vital Signs Last 24 Hrs  T(C): 36.6 (2023 08:41), Max: 37.3 (2023 21:03)  T(F): 97.9 (2023 08:41), Max: 99.2 (2023 21:03)  HR: 82 (2023 12:00) (82 - 114)  BP: 120/59  BP(mean): 80  RR: 16 (2023 12:00) (16 - 24)  SpO2: 97% (2023 12:00) (83% - 100%)    Parameters below as of 2023 12:00  Patient On (Oxygen Delivery Method): nasal cannula w/ humidification  O2 Flow (L/min): 6    I&O's Summary    2023 07:01  -  2023 07:00  --------------------------------------------------------  IN: 1749.7 mL / OUT: 3240 mL / NET: -1490.3 mL    2023 07:01  -  2023 12:47  --------------------------------------------------------  IN: 476.5 mL / OUT: 885 mL / NET: -408.5 mL    PHYSICAL EXAM  General: A&Ox 3; NAD  Respiratory: CTA B/L; no wheezes  Cardiovascular: Iiregular HR in 80s   Gastrointestinal: Distended, nontender, BS present   Extremities: Warm, trace LE edema   Neurological:  CNII-XII grossly intact; no obvious focal deficits    MEDICATIONS  (STANDING):  aspirin enteric coated 81 milliGRAM(s) Oral daily  calcium gluconate IVPB 2 Gram(s) IV Intermittent once  chlorhexidine 2% Cloths 1 Application(s) Topical daily  dexMEDEtomidine Infusion 0.2 MICROgram(s)/kG/Hr (5.6 mL/Hr) IV Continuous <Continuous>  dextrose 5%. 1000 milliLiter(s) (100 mL/Hr) IV Continuous <Continuous>  dextrose 5%. 1000 milliLiter(s) (50 mL/Hr) IV Continuous <Continuous>  dextrose 50% Injectable 25 Gram(s) IV Push once  dextrose 50% Injectable 12.5 Gram(s) IV Push once  dextrose 50% Injectable 25 Gram(s) IV Push once  DOBUTamine Infusion 5 MICROgram(s)/kG/Min (16.8 mL/Hr) IV Continuous <Continuous>  glucagon  Injectable 1 milliGRAM(s) IntraMuscular once  heparin  Infusion 500 Unit(s)/Hr (5 mL/Hr) IV Continuous <Continuous>  insulin lispro (ADMELOG) corrective regimen sliding scale   SubCutaneous Before meals and at bedtime  milrinone Infusion 0.375 MICROgram(s)/kG/Min (12.6 mL/Hr) IV Continuous <Continuous>  pantoprazole    Tablet 40 milliGRAM(s) Oral before breakfast  polyethylene glycol 3350 17 Gram(s) Oral at bedtime  potassium phosphate IVPB 15 milliMole(s) IV Intermittent once  sodium chloride 0.9%. 1000 milliLiter(s) (10 mL/Hr) IV Continuous <Continuous>    MEDICATIONS  (PRN):  acetaminophen     Tablet .. 650 milliGRAM(s) Oral every 6 hours PRN Mild Pain (1 - 3)  dextrose Oral Gel 15 Gram(s) Oral once PRN Blood Glucose LESS THAN 70 milliGRAM(s)/deciliter  fentaNYL    Injectable 25 MICROGram(s) IV Push every 3 hours PRN Severe Pain (7 - 10)  oxyCODONE    IR 5 milliGRAM(s) Oral every 4 hours PRN Moderate Pain (4 - 6)  oxyCODONE    IR 10 milliGRAM(s) Oral every 6 hours PRN Severe Pain (7 - 10)      LABS                        11.4   12.15 )-----------( 125      ( 2023 10:39 )             33.8     01-13    137  |  100  |  14  ----------------------------<  128<H>  4.0   |  27  |  0.77    Ca    8.7      2023 10:39  Phos  1.9       Mg     2.0         TPro  6.3  /  Alb  3.5  /  TBili  1.6<H>  /  DBili  x   /  AST  37  /  ALT  16  /  AlkPhos  37<L>      LIVER FUNCTIONS - ( 2023 10:39 )  Alb: 3.5 g/dL / Pro: 6.3 g/dL / ALK PHOS: 37 U/L / ALT: 16 U/L / AST: 37 U/L / GGT: x           PT/INR - ( 2023 10:39 )   PT: 15.7 sec;   INR: 1.32          PTT - ( 2023 10:39 )  PTT:24.9 sec    IMAGING/EKG/ETC  Reviewed.

## 2023-01-13 NOTE — PHYSICAL THERAPY INITIAL EVALUATION ADULT - GAIT DEVIATIONS NOTED, PT EVAL
patient with mildly unsteady gait at times, no LOB, leaning toward right (pt expressed 2/2 central line)/decreased jose/decreased step length

## 2023-01-13 NOTE — PROGRESS NOTE ADULT - ASSESSMENT
72 M  former smoker w/PMHx of HTN,  atrial fibrillation on coumadin, gout, hemochromatosis and severe mitral regurgitation admitted  for cardiac catheterization to r/o ischemic disease prior to surgical mgmt of MR via sternotomy with CryoMaze and Mitral clip.  Nonischemic CMP EF ~ 50%    S/p Mitral valve repair/ Maze and CHERI occlusion  EF 25-30 %   1/10  Received intubated and sedated  On Levo/Vaso/  and Mil  Woke up nonfocal/ extubated POD 1     ASSESSMENT/PLAN  Adequate pain control with tylenol  EP consulted for ? Afib and concern for AV dissociation on tele, on today's review by EP it looks like sinus tach--no intervention recommended   Lytes optimized Pacer wires back up VVI @ 40   Plan to wean  to half and leave milrinone at 0.25/ follow cardiac indices on vigileo   Good UOP with brisk response to lasix -- will monitor and redose for Neg 1-1.5 L FB per day   Good kidney function/lytes at goal, hypophos repleted   HCT in good range/ PLT normal will monitor for now   Nondiabetic, A1C 5%  will liberalize diet from carb restriction   Lines and tubes assessed--> bilateral mediastinal CTs dc'd     Ppx: low dose hep/ PPI/ HOB 45   OOB and mobilizing  Inotrop wean  low dose hep with transition to Eliquis tomorrow     ATTENDING: I have personally and independently provided 90  Min of critical care services. this excludes time spent on any procedures or teaching.  72 M  former smoker w/PMHx of HTN,  atrial fibrillation on coumadin, gout, hemochromatosis and severe mitral regurgitation admitted  for cardiac catheterization to r/o ischemic disease prior to surgical mgmt of MR via sternotomy with CryoMaze and Mitral clip.  Nonischemic CMP EF ~ 50%    S/p Mitral valve repair/ Maze and CHERI occlusion  EF 25-30 %   1/10  Received intubated and sedated  On Levo/Vaso/  and Mil  Woke up nonfocal/ extubated POD 1     ASSESSMENT/PLAN  Adequate pain control with tylenol  EP consulted for ? Afib and concern for AV dissociation on tele, on today's review by EP it looks like sinus tach--no intervention recommended   Lytes optimized Pacer wires back up VVI @ 40   Plan to wean  to half and leave milrinone at 0.25/ follow cardiac indices on vigileo   Good UOP with brisk response to lasix -- will monitor and redose for Neg 1-1.5 L FB per day   Good kidney function/lytes at goal, hypophos repleted   HCT in good range/ PLT normal will monitor for now   Nondiabetic, A1C 5%  will liberalize diet from carb restriction   Lines and tubes assessed--> bilateral mediastinal CTs dc'd     Ppx: low dose hep/ PPI/ HOB 45   OOB and mobilizing  Inotrop wean  low dose hep with transition to Eliquis tomorrow   Night Bipap for atelectasis     ATTENDING: I have personally and independently provided 90  Min of critical care services. this excludes time spent on any procedures or teaching.

## 2023-01-13 NOTE — PHYSICAL THERAPY INITIAL EVALUATION ADULT - DID THE PATIENT HAVE SURGERY?
Repair, mitral valve; Maze procedure with cardiopulmonary bypass; Occlusion, left atrial appendage/yes

## 2023-01-14 LAB
ALBUMIN SERPL ELPH-MCNC: 3.5 G/DL — SIGNIFICANT CHANGE UP (ref 3.3–5)
ALBUMIN SERPL ELPH-MCNC: 3.7 G/DL — SIGNIFICANT CHANGE UP (ref 3.3–5)
ALP SERPL-CCNC: 37 U/L — LOW (ref 40–120)
ALP SERPL-CCNC: 41 U/L — SIGNIFICANT CHANGE UP (ref 40–120)
ALT FLD-CCNC: 15 U/L — SIGNIFICANT CHANGE UP (ref 10–45)
ALT FLD-CCNC: 18 U/L — SIGNIFICANT CHANGE UP (ref 10–45)
ANION GAP SERPL CALC-SCNC: 12 MMOL/L — SIGNIFICANT CHANGE UP (ref 5–17)
ANION GAP SERPL CALC-SCNC: 9 MMOL/L — SIGNIFICANT CHANGE UP (ref 5–17)
APTT BLD: 28.2 SEC — SIGNIFICANT CHANGE UP (ref 27.5–35.5)
APTT BLD: 28.8 SEC — SIGNIFICANT CHANGE UP (ref 27.5–35.5)
AST SERPL-CCNC: 28 U/L — SIGNIFICANT CHANGE UP (ref 10–40)
AST SERPL-CCNC: 30 U/L — SIGNIFICANT CHANGE UP (ref 10–40)
BASE EXCESS BLDV CALC-SCNC: 3.7 MMOL/L — HIGH (ref -2–3)
BILIRUB SERPL-MCNC: 1.2 MG/DL — SIGNIFICANT CHANGE UP (ref 0.2–1.2)
BILIRUB SERPL-MCNC: 1.4 MG/DL — HIGH (ref 0.2–1.2)
BUN SERPL-MCNC: 18 MG/DL — SIGNIFICANT CHANGE UP (ref 7–23)
BUN SERPL-MCNC: 20 MG/DL — SIGNIFICANT CHANGE UP (ref 7–23)
CA-I SERPL-SCNC: 1.19 MMOL/L — SIGNIFICANT CHANGE UP (ref 1.15–1.33)
CALCIUM SERPL-MCNC: 8.7 MG/DL — SIGNIFICANT CHANGE UP (ref 8.4–10.5)
CALCIUM SERPL-MCNC: 8.8 MG/DL — SIGNIFICANT CHANGE UP (ref 8.4–10.5)
CHLORIDE SERPL-SCNC: 96 MMOL/L — SIGNIFICANT CHANGE UP (ref 96–108)
CHLORIDE SERPL-SCNC: 98 MMOL/L — SIGNIFICANT CHANGE UP (ref 96–108)
CO2 BLDV-SCNC: 31.2 MMOL/L — HIGH (ref 22–26)
CO2 SERPL-SCNC: 25 MMOL/L — SIGNIFICANT CHANGE UP (ref 22–31)
CO2 SERPL-SCNC: 30 MMOL/L — SIGNIFICANT CHANGE UP (ref 22–31)
CREAT SERPL-MCNC: 0.77 MG/DL — SIGNIFICANT CHANGE UP (ref 0.5–1.3)
CREAT SERPL-MCNC: 0.84 MG/DL — SIGNIFICANT CHANGE UP (ref 0.5–1.3)
EGFR: 93 ML/MIN/1.73M2 — SIGNIFICANT CHANGE UP
EGFR: 95 ML/MIN/1.73M2 — SIGNIFICANT CHANGE UP
GAS PNL BLDA: SIGNIFICANT CHANGE UP
GAS PNL BLDA: SIGNIFICANT CHANGE UP
GAS PNL BLDV: 132 MMOL/L — LOW (ref 136–145)
GAS PNL BLDV: SIGNIFICANT CHANGE UP
GLUCOSE BLDC GLUCOMTR-MCNC: 101 MG/DL — HIGH (ref 70–99)
GLUCOSE BLDC GLUCOMTR-MCNC: 117 MG/DL — HIGH (ref 70–99)
GLUCOSE BLDC GLUCOMTR-MCNC: 120 MG/DL — HIGH (ref 70–99)
GLUCOSE BLDC GLUCOMTR-MCNC: 99 MG/DL — SIGNIFICANT CHANGE UP (ref 70–99)
GLUCOSE SERPL-MCNC: 107 MG/DL — HIGH (ref 70–99)
GLUCOSE SERPL-MCNC: 126 MG/DL — HIGH (ref 70–99)
HCO3 BLDV-SCNC: 30 MMOL/L — HIGH (ref 22–29)
HCT VFR BLD CALC: 33.1 % — LOW (ref 39–50)
HCT VFR BLD CALC: 35.3 % — LOW (ref 39–50)
HGB BLD-MCNC: 11.1 G/DL — LOW (ref 13–17)
HGB BLD-MCNC: 12 G/DL — LOW (ref 13–17)
INR BLD: 1.26 — HIGH (ref 0.88–1.16)
INR BLD: 1.35 — HIGH (ref 0.88–1.16)
LACTATE SERPL-SCNC: 0.9 MMOL/L — SIGNIFICANT CHANGE UP (ref 0.5–2)
MAGNESIUM SERPL-MCNC: 1.8 MG/DL — SIGNIFICANT CHANGE UP (ref 1.6–2.6)
MAGNESIUM SERPL-MCNC: 2.3 MG/DL — SIGNIFICANT CHANGE UP (ref 1.6–2.6)
MCHC RBC-ENTMCNC: 31.1 PG — SIGNIFICANT CHANGE UP (ref 27–34)
MCHC RBC-ENTMCNC: 31.3 PG — SIGNIFICANT CHANGE UP (ref 27–34)
MCHC RBC-ENTMCNC: 33.5 GM/DL — SIGNIFICANT CHANGE UP (ref 32–36)
MCHC RBC-ENTMCNC: 34 GM/DL — SIGNIFICANT CHANGE UP (ref 32–36)
MCV RBC AUTO: 92.2 FL — SIGNIFICANT CHANGE UP (ref 80–100)
MCV RBC AUTO: 92.7 FL — SIGNIFICANT CHANGE UP (ref 80–100)
NRBC # BLD: 0 /100 WBCS — SIGNIFICANT CHANGE UP (ref 0–0)
NRBC # BLD: 0 /100 WBCS — SIGNIFICANT CHANGE UP (ref 0–0)
PCO2 BLDV: 49 MMHG — SIGNIFICANT CHANGE UP (ref 42–55)
PH BLDV: 7.39 — SIGNIFICANT CHANGE UP (ref 7.32–7.43)
PHOSPHATE SERPL-MCNC: 2.2 MG/DL — LOW (ref 2.5–4.5)
PHOSPHATE SERPL-MCNC: 2.3 MG/DL — LOW (ref 2.5–4.5)
PLATELET # BLD AUTO: 143 K/UL — LOW (ref 150–400)
PLATELET # BLD AUTO: 165 K/UL — SIGNIFICANT CHANGE UP (ref 150–400)
PO2 BLDV: 33 MMHG — SIGNIFICANT CHANGE UP (ref 25–45)
POTASSIUM BLDV-SCNC: 4.3 MMOL/L — SIGNIFICANT CHANGE UP (ref 3.5–5.1)
POTASSIUM SERPL-MCNC: 3.8 MMOL/L — SIGNIFICANT CHANGE UP (ref 3.5–5.3)
POTASSIUM SERPL-MCNC: 4.3 MMOL/L — SIGNIFICANT CHANGE UP (ref 3.5–5.3)
POTASSIUM SERPL-SCNC: 3.8 MMOL/L — SIGNIFICANT CHANGE UP (ref 3.5–5.3)
POTASSIUM SERPL-SCNC: 4.3 MMOL/L — SIGNIFICANT CHANGE UP (ref 3.5–5.3)
PROT SERPL-MCNC: 6.3 G/DL — SIGNIFICANT CHANGE UP (ref 6–8.3)
PROT SERPL-MCNC: 6.9 G/DL — SIGNIFICANT CHANGE UP (ref 6–8.3)
PROTHROM AB SERPL-ACNC: 15 SEC — HIGH (ref 10.5–13.4)
PROTHROM AB SERPL-ACNC: 16.1 SEC — HIGH (ref 10.5–13.4)
RBC # BLD: 3.57 M/UL — LOW (ref 4.2–5.8)
RBC # BLD: 3.83 M/UL — LOW (ref 4.2–5.8)
RBC # FLD: 13.2 % — SIGNIFICANT CHANGE UP (ref 10.3–14.5)
RBC # FLD: 13.2 % — SIGNIFICANT CHANGE UP (ref 10.3–14.5)
SAO2 % BLDV: 56.4 % — LOW (ref 67–88)
SODIUM SERPL-SCNC: 133 MMOL/L — LOW (ref 135–145)
SODIUM SERPL-SCNC: 137 MMOL/L — SIGNIFICANT CHANGE UP (ref 135–145)
WBC # BLD: 10.02 K/UL — SIGNIFICANT CHANGE UP (ref 3.8–10.5)
WBC # BLD: 11.34 K/UL — HIGH (ref 3.8–10.5)
WBC # FLD AUTO: 10.02 K/UL — SIGNIFICANT CHANGE UP (ref 3.8–10.5)
WBC # FLD AUTO: 11.34 K/UL — HIGH (ref 3.8–10.5)

## 2023-01-14 PROCEDURE — 99292 CRITICAL CARE ADDL 30 MIN: CPT

## 2023-01-14 PROCEDURE — 99291 CRITICAL CARE FIRST HOUR: CPT

## 2023-01-14 PROCEDURE — 99233 SBSQ HOSP IP/OBS HIGH 50: CPT

## 2023-01-14 PROCEDURE — 99232 SBSQ HOSP IP/OBS MODERATE 35: CPT | Mod: GC

## 2023-01-14 PROCEDURE — 71045 X-RAY EXAM CHEST 1 VIEW: CPT | Mod: 26

## 2023-01-14 RX ORDER — SPIRONOLACTONE 25 MG/1
25 TABLET, FILM COATED ORAL DAILY
Refills: 0 | Status: DISCONTINUED | OUTPATIENT
Start: 2023-01-14 | End: 2023-01-18

## 2023-01-14 RX ORDER — POTASSIUM CHLORIDE 20 MEQ
20 PACKET (EA) ORAL ONCE
Refills: 0 | Status: COMPLETED | OUTPATIENT
Start: 2023-01-14 | End: 2023-01-14

## 2023-01-14 RX ORDER — MAGNESIUM OXIDE 400 MG ORAL TABLET 241.3 MG
800 TABLET ORAL ONCE
Refills: 0 | Status: COMPLETED | OUTPATIENT
Start: 2023-01-14 | End: 2023-01-14

## 2023-01-14 RX ORDER — AMIODARONE HYDROCHLORIDE 400 MG/1
150 TABLET ORAL ONCE
Refills: 0 | Status: COMPLETED | OUTPATIENT
Start: 2023-01-14 | End: 2023-01-14

## 2023-01-14 RX ORDER — BUMETANIDE 0.25 MG/ML
1 INJECTION INTRAMUSCULAR; INTRAVENOUS ONCE
Refills: 0 | Status: COMPLETED | OUTPATIENT
Start: 2023-01-14 | End: 2023-01-14

## 2023-01-14 RX ORDER — MAGNESIUM SULFATE 500 MG/ML
2 VIAL (ML) INJECTION ONCE
Refills: 0 | Status: COMPLETED | OUTPATIENT
Start: 2023-01-14 | End: 2023-01-14

## 2023-01-14 RX ORDER — LIDOCAINE 4 G/100G
1 CREAM TOPICAL DAILY
Refills: 0 | Status: DISCONTINUED | OUTPATIENT
Start: 2023-01-14 | End: 2023-01-18

## 2023-01-14 RX ADMIN — POLYETHYLENE GLYCOL 3350 17 GRAM(S): 17 POWDER, FOR SOLUTION ORAL at 22:52

## 2023-01-14 RX ADMIN — SPIRONOLACTONE 25 MILLIGRAM(S): 25 TABLET, FILM COATED ORAL at 19:31

## 2023-01-14 RX ADMIN — Medication 25 GRAM(S): at 08:56

## 2023-01-14 RX ADMIN — Medication 16.8 MICROGRAM(S)/KG/MIN: at 13:16

## 2023-01-14 RX ADMIN — Medication 20 MILLIEQUIVALENT(S): at 07:33

## 2023-01-14 RX ADMIN — Medication 650 MILLIGRAM(S): at 16:30

## 2023-01-14 RX ADMIN — Medication 100 MILLIEQUIVALENT(S): at 16:47

## 2023-01-14 RX ADMIN — MAGNESIUM OXIDE 400 MG ORAL TABLET 800 MILLIGRAM(S): 241.3 TABLET ORAL at 07:30

## 2023-01-14 RX ADMIN — AMIODARONE HYDROCHLORIDE 200 MILLIGRAM(S): 400 TABLET ORAL at 09:18

## 2023-01-14 RX ADMIN — BUMETANIDE 1 MILLIGRAM(S): 0.25 INJECTION INTRAMUSCULAR; INTRAVENOUS at 16:47

## 2023-01-14 RX ADMIN — MILRINONE LACTATE 12.6 MICROGRAM(S)/KG/MIN: 1 INJECTION, SOLUTION INTRAVENOUS at 14:08

## 2023-01-14 RX ADMIN — LIDOCAINE 1 PATCH: 4 CREAM TOPICAL at 19:31

## 2023-01-14 RX ADMIN — PANTOPRAZOLE SODIUM 40 MILLIGRAM(S): 20 TABLET, DELAYED RELEASE ORAL at 06:01

## 2023-01-14 RX ADMIN — Medication 81 MILLIGRAM(S): at 13:16

## 2023-01-14 RX ADMIN — CHLORHEXIDINE GLUCONATE 1 APPLICATION(S): 213 SOLUTION TOPICAL at 14:25

## 2023-01-14 RX ADMIN — Medication 650 MILLIGRAM(S): at 15:31

## 2023-01-14 RX ADMIN — SIMETHICONE 80 MILLIGRAM(S): 80 TABLET, CHEWABLE ORAL at 15:31

## 2023-01-14 NOTE — PROGRESS NOTE ADULT - SUBJECTIVE AND OBJECTIVE BOX
CTICU  CRITICAL  CARE  attending     Hand off received 					   Pertinent clinical, laboratory, radiographic, hemodynamic, echocardiographic, respiratory data, microbiologic data and chart were reviewed and analyzed frequently throughout the course of the day and night  Patient seen and examined with CTS/ SH attending at bedside    Pt is a 72y , Male, post op day # 4 s/p MV repair ( for severe mitral regurgitation);  hx of non ischemic cardiomyopathy ( LVEF 20-25%)    post op:    low cardiac output state   multiple inotrope support  afib/RVR alternating with wenkebach     today:    atrial fibrillation alternating with sinus/1st degree  Inotrope support with dobut and Milrinone  diuresed  supplemental O2     Vasoactive infusions:    Primacor @ 0.125 mcgs  Dobutamine @ 2.5 mcgs    73 y/o M (former smoker), with PMHx of HTN,  atrial fibrillation (on coumadin), renal calculi, gout, hemochromatosis (managed by Dr. Tenzin Gomez), nonischemic cardiomyopathy (EF: 50-55% as per echo below), and severe mitral regurgitation, who presented to Dr. Atkins, for his valvular disease. Patient feels well overall, denies current symptoms. Patient denies CP, SOB, palpitations, orthopnea, LE edema, syncope, n/v, dizziness, diaphoresis, claudication, fever, chills, recent travel, or sick contacts.  ECHO 11/14/2022: Severely dilated LA. Severe MVR. Mild TR. Prolapse of the posterior mitral leaflet. Systolic flow reversal in the pulmonary veins, which is consistent with severe MR. EF: 50-55%.         , FAMILY HISTORY:  FH: arthritis (Mother)    FH: glaucoma (Sibling)    FH: hypothyroidism (Sibling)    PAST MEDICAL & SURGICAL HISTORY:  Hypertension      Renal calculi      Gout      History of hemochromatosis      Nonischemic cardiomyopathy      Mitral regurgitation      History of cholecystectomy        Patient is a 72y old  Male who presents with a chief complaint of mitral valve prolapse (12 Jan 2023 16:56)      14 system review limited 2/2 post op morbidity  acute changes include acute respiratory failure  Vital signs, hemodynamic and respiratory parameters were reviewed from the bedside nursing flowsheet.  ICU Vital Signs Last 24 Hrs  T(C): 36.1 (14 Jan 2023 13:25), Max: 36.8 (13 Jan 2023 17:50)  T(F): 97 (14 Jan 2023 13:25), Max: 98.2 (13 Jan 2023 17:50)  HR: 102 (14 Jan 2023 15:00) (83 - 115)  BP: 124/79 (14 Jan 2023 10:00) (124/79 - 124/79)  BP(mean): 94 (14 Jan 2023 10:00) (94 - 94)  ABP: 112/68 (14 Jan 2023 15:00) (110/55 - 145/74)  ABP(mean): 82 (14 Jan 2023 15:00) (78 - 98)  RR: 16 (14 Jan 2023 15:00) (15 - 20)  SpO2: 97% (14 Jan 2023 15:00) (94% - 100%)    O2 Parameters below as of 14 Jan 2023 15:00  Patient On (Oxygen Delivery Method): nasal cannula w/ humidification  O2 Flow (L/min): 6        Adult Advanced Hemodynamics Last 24 Hrs  CVP(mm Hg): 14 (14 Jan 2023 15:00) (1 - 18)  CVP(cm H2O): --  CO: 5.8 (14 Jan 2023 09:00) (5.1 - 5.8)  CI: 2.5 (14 Jan 2023 09:00) (2.2 - 2.5)  PA: --  PA(mean): --  PCWP: --  SVR: 1019 (14 Jan 2023 09:00) (1019 - 1269)  SVRI: 2365 (14 Jan 2023 09:00) (2365 - 2941)  PVR: --  PVRI: --, ABG - ( 14 Jan 2023 11:33 )  pH, Arterial: 7.49  pH, Blood: x     /  pCO2: 36    /  pO2: 102   / HCO3: 27    / Base Excess: 4.1   /  SaO2: 98.7                Intake and output was reviewed and the fluid balance was calculated  Daily     Daily   I&O's Summary    13 Jan 2023 07:01  -  14 Jan 2023 07:00  --------------------------------------------------------  IN: 2301.1 mL / OUT: 4735 mL / NET: -2433.9 mL    14 Jan 2023 07:01  -  14 Jan 2023 16:49  --------------------------------------------------------  IN: 681.6 mL / OUT: 1495 mL / NET: -813.4 mL        All lines and drain sites were assessed  [facility]      POCT Blood Glucose.: 120 mg/dL (14 Jan 2023 16:44)    No acute change in mental status  Auscultation of the chest reveals equal bs  Abdomen is soft  Extremities are warm and well perfused  Wounds appear clean and unremarkable  Antibiotics are periop    labs  CBC Full  -  ( 14 Jan 2023 10:42 )  WBC Count : 11.34 K/uL  RBC Count : 3.83 M/uL  Hemoglobin : 12.0 g/dL  Hematocrit : 35.3 %  Platelet Count - Automated : 165 K/uL  Mean Cell Volume : 92.2 fl  Mean Cell Hemoglobin : 31.3 pg  Mean Cell Hemoglobin Concentration : 34.0 gm/dL  Auto Neutrophil # : x  Auto Lymphocyte # : x  Auto Monocyte # : x  Auto Eosinophil # : x  Auto Basophil # : x  Auto Neutrophil % : x  Auto Lymphocyte % : x  Auto Monocyte % : x  Auto Eosinophil % : x  Auto Basophil % : x    01-14    133<L>  |  96  |  20  ----------------------------<  126<H>  4.3   |  25  |  0.77    Ca    8.8      14 Jan 2023 10:42  Phos  2.2     01-14  Mg     2.3     01-14    TPro  6.9  /  Alb  3.7  /  TBili  1.2  /  DBili  x   /  AST  30  /  ALT  18  /  AlkPhos  41  01-14    PT/INR - ( 14 Jan 2023 10:42 )   PT: 15.0 sec;   INR: 1.26          PTT - ( 14 Jan 2023 10:42 )  PTT:28.2 sec  The current medications were reviewed   MEDICATIONS  (STANDING):  aspirin enteric coated 81 milliGRAM(s) Oral daily  chlorhexidine 2% Cloths 1 Application(s) Topical daily  dexMEDEtomidine Infusion 0.2 MICROgram(s)/kG/Hr (5.6 mL/Hr) IV Continuous <Continuous>  dextrose 5%. 1000 milliLiter(s) (100 mL/Hr) IV Continuous <Continuous>  dextrose 5%. 1000 milliLiter(s) (50 mL/Hr) IV Continuous <Continuous>  dextrose 50% Injectable 25 Gram(s) IV Push once  dextrose 50% Injectable 12.5 Gram(s) IV Push once  dextrose 50% Injectable 25 Gram(s) IV Push once  DOBUTamine Infusion 5 MICROgram(s)/kG/Min (16.8 mL/Hr) IV Continuous <Continuous>  glucagon  Injectable 1 milliGRAM(s) IntraMuscular once  heparin  Infusion 500 Unit(s)/Hr (5 mL/Hr) IV Continuous <Continuous>  insulin lispro (ADMELOG) corrective regimen sliding scale   SubCutaneous Before meals and at bedtime  milrinone Infusion 0.375 MICROgram(s)/kG/Min (12.6 mL/Hr) IV Continuous <Continuous>  pantoprazole    Tablet 40 milliGRAM(s) Oral before breakfast  polyethylene glycol 3350 17 Gram(s) Oral at bedtime  sodium chloride 0.9%. 1000 milliLiter(s) (10 mL/Hr) IV Continuous <Continuous>    MEDICATIONS  (PRN):  acetaminophen     Tablet .. 650 milliGRAM(s) Oral every 6 hours PRN Mild Pain (1 - 3)  dextrose Oral Gel 15 Gram(s) Oral once PRN Blood Glucose LESS THAN 70 milliGRAM(s)/deciliter  fentaNYL    Injectable 25 MICROGram(s) IV Push every 3 hours PRN Severe Pain (7 - 10)  oxyCODONE    IR 5 milliGRAM(s) Oral every 4 hours PRN Moderate Pain (4 - 6)  oxyCODONE    IR 10 milliGRAM(s) Oral every 6 hours PRN Severe Pain (7 - 10)  simethicone 80 milliGRAM(s) Chew every 6 hours PRN Gas       PROBLEM LIST/ ASSESSMENT:  HEALTH ISSUES - PROBLEM Dx:      ,   Patient is a 72y old  Male who presents with a chief complaint of mitral valve prolapse (12 Jan 2023 16:56)     s/p MV repair   acute changes include acute respiratory failure    My plan includes :    Continue Inotrope support  Wean over the next 12-24 hrs  Add Spironolactone ( per HF )  Trend lactate levels  Diurese to maintain negative fluid balance  Supplemental O2; titrate Fio2 to maintain Sao2 >95%  close hemodynamic, ventilatory and drain monitoring and management per post op routine    Monitor for arrhythmias and monitor parameters for organ perfusion  monitor neurologic status  Head of the bed should remain elevated to 45 deg .   chest PT and IS will be encouraged  monitor adequacy of oxygenation and ventilation and attempt to wean oxygen  Nutritional goals will be met using po eventually , ensure adequate caloric intake and montior the same  Stress ulcer and VTE prophylaxis will be achieved    Glycemic control is satisfactory  Electrolytes have been repleted as necessary and wound care has been carried out. Pain control has been achieved.   agressive physical therapy and early mobility and ambulation goals will be met   The family was updated about the course and plan  CRITICAL CARE TIME SPENT in evaluation and management, reassessments, review and interpretation of labs and x-rays, ventilator and hemodynamic management, formulating a plan and coordinating care: ___90____ MIN.  Time does not include procedural time.  CTICU ATTENDING     					    Napoleon Lima MD

## 2023-01-14 NOTE — PROGRESS NOTE ADULT - SUBJECTIVE AND OBJECTIVE BOX
INTERVAL EVENTS: no acute events    PAST MEDICAL & SURGICAL HISTORY:  Hypertension    Renal calculi    Gout    History of hemochromatosis    Nonischemic cardiomyopathy    Mitral regurgitation    History of cholecystectomy        MEDICATIONS  (STANDING):  aspirin enteric coated 81 milliGRAM(s) Oral daily  chlorhexidine 2% Cloths 1 Application(s) Topical daily  dexMEDEtomidine Infusion 0.2 MICROgram(s)/kG/Hr (5.6 mL/Hr) IV Continuous <Continuous>  dextrose 5%. 1000 milliLiter(s) (100 mL/Hr) IV Continuous <Continuous>  dextrose 5%. 1000 milliLiter(s) (50 mL/Hr) IV Continuous <Continuous>  dextrose 50% Injectable 25 Gram(s) IV Push once  dextrose 50% Injectable 12.5 Gram(s) IV Push once  dextrose 50% Injectable 25 Gram(s) IV Push once  DOBUTamine Infusion 5 MICROgram(s)/kG/Min (16.8 mL/Hr) IV Continuous <Continuous>  glucagon  Injectable 1 milliGRAM(s) IntraMuscular once  heparin  Infusion 500 Unit(s)/Hr (5 mL/Hr) IV Continuous <Continuous>  insulin lispro (ADMELOG) corrective regimen sliding scale   SubCutaneous Before meals and at bedtime  milrinone Infusion 0.375 MICROgram(s)/kG/Min (12.6 mL/Hr) IV Continuous <Continuous>  pantoprazole    Tablet 40 milliGRAM(s) Oral before breakfast  polyethylene glycol 3350 17 Gram(s) Oral at bedtime  sodium chloride 0.9%. 1000 milliLiter(s) (10 mL/Hr) IV Continuous <Continuous>    MEDICATIONS  (PRN):  acetaminophen     Tablet .. 650 milliGRAM(s) Oral every 6 hours PRN Mild Pain (1 - 3)  dextrose Oral Gel 15 Gram(s) Oral once PRN Blood Glucose LESS THAN 70 milliGRAM(s)/deciliter  fentaNYL    Injectable 25 MICROGram(s) IV Push every 3 hours PRN Severe Pain (7 - 10)  oxyCODONE    IR 5 milliGRAM(s) Oral every 4 hours PRN Moderate Pain (4 - 6)  oxyCODONE    IR 10 milliGRAM(s) Oral every 6 hours PRN Severe Pain (7 - 10)  simethicone 80 milliGRAM(s) Chew every 6 hours PRN Gas    T(F): 97 (01-14-23 @ 13:25), Max: 98.2 (01-13-23 @ 17:50)  HR: 91 (01-14-23 @ 17:00) (83 - 115)  BP: 124/79 (01-14-23 @ 10:00) (124/79 - 124/79)  BP(mean): 94 (01-14-23 @ 10:00) (94 - 94)  ABP: 113/65 (01-14-23 @ 17:00) (110/55 - 145/74)  ABP(mean): 81 (01-14-23 @ 17:00) (79 - 98)  RR: 16 (01-14-23 @ 17:00) (15 - 20)  SpO2: 99% (01-14-23 @ 17:00) (94% - 100%)    I/O Detail 24H    13 Jan 2023 07:01  -  14 Jan 2023 07:00  --------------------------------------------------------  IN:    DOBUTamine: 201.6 mL    Heparin: 105 mL    IV PiggyBack: 675 mL    Milrinone: 127.5 mL    Oral Fluid: 960 mL    sodium chloride 0.9%: 232 mL  Total IN: 2301.1 mL    OUT:    Indwelling Catheter - Urethral (mL): 4735 mL  Total OUT: 4735 mL    Total NET: -2433.9 mL      14 Jan 2023 07:01  -  14 Jan 2023 17:16  --------------------------------------------------------  IN:    DOBUTamine: 84 mL    Heparin: 50 mL    IV PiggyBack: 225 mL    Milrinone: 37.8 mL    Oral Fluid: 360 mL    sodium chloride 0.9%: 90 mL  Total IN: 846.8 mL    OUT:    Indwelling Catheter - Urethral (mL): 1680 mL  Total OUT: 1680 mL    Total NET: -833.2 mL          PHYSICAL EXAM:  GEN: Awake, alert. NAD.   HEENT: JVP normal  RESP: CTA b/l  CV: Irregularly irregular  EXT: Warm. No edema  NEURO: AAOx3. No focal deficits.     LABS:  CBC 01-14-23 @ 10:42                        12.0   11.34 )-----------( 165                   35.3       Hgb trend: 12.0 <-- , 11.1 <-- , 11.3 <-- , 11.4 <-- , 10.8 <-- , 10.8 <-- , 11.4 <-- , 10.9 <-- , 10.9 <--   WBC trend: 11.34 <-- , 10.02 <-- , 11.80 <-- , 12.15 <-- , 11.46 <-- , 12.42 <-- , 13.76 <-- , 13.34 <-- , 13.39 <--       CMP 01-14-23 @ 10:42    133<L>  |  96  |  20  ----------------------------<  126<H>  4.3   |  25  |  0.77    Ca    8.8      01-14-23 @ 10:42  Phos  2.2     01-14  Mg     2.3     01-14    TPro  6.9  /  Alb  3.7  /  TBili  1.2  /  DBili  x   /  AST  30  /  ALT  18  /  AlkPhos  41  01-14      Serum Cr trend: 0.77 <-- , 0.84 <-- , 0.82 <-- , 0.77 <-- , 0.82 <-- , 0.86 <-- , 0.83 <-- , 0.89 <-- , 0.94 <--     PT/INR - ( 14 Jan 2023 10:42 )   PT: 15.0 sec;   INR: 1.26          PTT - ( 14 Jan 2023 10:42 ):28.2 sec    Cardiac Markers           STUDIES:

## 2023-01-14 NOTE — PROGRESS NOTE ADULT - ASSESSMENT
EPS Progress Note    S: no events overnight    on low dose IV milrinone and dobutamine.  T(C): 36.1 (01-14-23 @ 09:25), Max: 36.8 (01-13-23 @ 14:32)  HR: 98 (01-14-23 @ 11:00) (82 - 115)  BP: 124/79 (01-14-23 @ 10:00) (124/79 - 124/79)  RR: 18 (01-14-23 @ 11:00) (15 - 20)  SpO2: 97% (01-14-23 @ 11:00) (94% - 100%)  Wt(kg): --     TELE: sinus tach with long first degree. HR ~100s rare episodes of afib note d    PHYSICAL  Pulm:  diminished.   Cardiac:   + s1/s2, regular.   GI:  +BS , soft  Vascular: No LE edema  Neuro: AAO x 3          MEDICATIONS  (STANDING):  aspirin enteric coated 81 milliGRAM(s) Oral daily  chlorhexidine 2% Cloths 1 Application(s) Topical daily  dexMEDEtomidine Infusion 0.2 MICROgram(s)/kG/Hr (5.6 mL/Hr) IV Continuous <Continuous>  dextrose 5%. 1000 milliLiter(s) (100 mL/Hr) IV Continuous <Continuous>  dextrose 5%. 1000 milliLiter(s) (50 mL/Hr) IV Continuous <Continuous>  dextrose 50% Injectable 25 Gram(s) IV Push once  dextrose 50% Injectable 12.5 Gram(s) IV Push once  dextrose 50% Injectable 25 Gram(s) IV Push once  DOBUTamine Infusion 5 MICROgram(s)/kG/Min (16.8 mL/Hr) IV Continuous <Continuous>  glucagon  Injectable 1 milliGRAM(s) IntraMuscular once  heparin  Infusion 500 Unit(s)/Hr (5 mL/Hr) IV Continuous <Continuous>  insulin lispro (ADMELOG) corrective regimen sliding scale   SubCutaneous Before meals and at bedtime  milrinone Infusion 0.375 MICROgram(s)/kG/Min (12.6 mL/Hr) IV Continuous <Continuous>  pantoprazole    Tablet 40 milliGRAM(s) Oral before breakfast  polyethylene glycol 3350 17 Gram(s) Oral at bedtime  sodium chloride 0.9%. 1000 milliLiter(s) (10 mL/Hr) IV Continuous <Continuous>    MEDICATIONS  (PRN):  acetaminophen     Tablet .. 650 milliGRAM(s) Oral every 6 hours PRN Mild Pain (1 - 3)  dextrose Oral Gel 15 Gram(s) Oral once PRN Blood Glucose LESS THAN 70 milliGRAM(s)/deciliter  fentaNYL    Injectable 25 MICROGram(s) IV Push every 3 hours PRN Severe Pain (7 - 10)  oxyCODONE    IR 5 milliGRAM(s) Oral every 4 hours PRN Moderate Pain (4 - 6)  oxyCODONE    IR 10 milliGRAM(s) Oral every 6 hours PRN Severe Pain (7 - 10)  simethicone 80 milliGRAM(s) Chew every 6 hours PRN Gas                                                         12.0   11.34 )-----------( 165      ( 14 Jan 2023 10:42 )             35.3     01-14    137  |  98  |  18  ----------------------------<  107<H>  3.8   |  30  |  0.84    Ca    8.7      14 Jan 2023 03:09  Phos  2.3     01-14  Mg     1.8     01-14    TPro  6.3  /  Alb  3.5  /  TBili  1.4<H>  /  DBili  x   /  AST  28  /  ALT  15  /  AlkPhos  37<L>  01-14    PT/INR - ( 14 Jan 2023 10:42 )   PT: 15.0 sec;   INR: 1.26          PTT - ( 14 Jan 2023 10:42 )  PTT:28.2 sec    Assessment/Plan:    71 y/o overweight M with history of HTN, paroxysmal atrial fibrillation (on coumadin), renal calculi, gout, hemochromatosis, nonischemic cardiomyopathy (EF: 50-55% on echo in 11/2022), and severe mitral regurgitation.  s/p MV repair, MAZE, AtriClip on 1/10/23.  Intraop LVEF 25-30%.  POD1 pt was in/out of AFIB.  POD 2 with AV dissociation but has a stable accelerated junctional rhythm of 80s.  Not required to use backup epicardial pacing.    Is in sinus rhythm with 1:1 conduction and a prolonged VT interval

## 2023-01-14 NOTE — PROGRESS NOTE ADULT - ASSESSMENT
ASSESSMENT:  72M Former smoker, Hx of hemochromatosis, HTN, AFib (on coumadin), NICM with severe MR presented for elective mitral valve repair of primary severe MR with prolapse of posterior leaflet. Advanced HF consulted for further post op management.     Cardiac Studies:  LHC (1/9/23): No significan coronary disease  CARLY (11/14/22): LVEF 50-55%, severely dilated LA, severe MR prolapse of posterior leaflet w/ PV flow systolic flow reversal, mitral regurgitation jet is directed toward septum, no LA/CHERI thrombus      PLAN:  Nonischemic Cardiomyopathy  Etiology: NICM, LVEF borderline 50-55%  GDMT: Currently on inotropes, continue to wean per primary team   - Would add spironolactone 25mg daily and hydralazine 10mg TID today      Discussed with Dr. Blanchard, HF attending.

## 2023-01-15 LAB
ALBUMIN SERPL ELPH-MCNC: 3.4 G/DL — SIGNIFICANT CHANGE UP (ref 3.3–5)
ALBUMIN SERPL ELPH-MCNC: 3.6 G/DL — SIGNIFICANT CHANGE UP (ref 3.3–5)
ALP SERPL-CCNC: 34 U/L — LOW (ref 40–120)
ALP SERPL-CCNC: 43 U/L — SIGNIFICANT CHANGE UP (ref 40–120)
ALT FLD-CCNC: 17 U/L — SIGNIFICANT CHANGE UP (ref 10–45)
ALT FLD-CCNC: 25 U/L — SIGNIFICANT CHANGE UP (ref 10–45)
ANION GAP SERPL CALC-SCNC: 7 MMOL/L — SIGNIFICANT CHANGE UP (ref 5–17)
ANION GAP SERPL CALC-SCNC: 9 MMOL/L — SIGNIFICANT CHANGE UP (ref 5–17)
APTT BLD: 27.5 SEC — SIGNIFICANT CHANGE UP (ref 27.5–35.5)
APTT BLD: 28.5 SEC — SIGNIFICANT CHANGE UP (ref 27.5–35.5)
AST SERPL-CCNC: 24 U/L — SIGNIFICANT CHANGE UP (ref 10–40)
AST SERPL-CCNC: 36 U/L — SIGNIFICANT CHANGE UP (ref 10–40)
BILIRUB SERPL-MCNC: 1.1 MG/DL — SIGNIFICANT CHANGE UP (ref 0.2–1.2)
BILIRUB SERPL-MCNC: 1.2 MG/DL — SIGNIFICANT CHANGE UP (ref 0.2–1.2)
BUN SERPL-MCNC: 19 MG/DL — SIGNIFICANT CHANGE UP (ref 7–23)
BUN SERPL-MCNC: 20 MG/DL — SIGNIFICANT CHANGE UP (ref 7–23)
CALCIUM SERPL-MCNC: 7.7 MG/DL — LOW (ref 8.4–10.5)
CALCIUM SERPL-MCNC: 8.7 MG/DL — SIGNIFICANT CHANGE UP (ref 8.4–10.5)
CHLORIDE SERPL-SCNC: 102 MMOL/L — SIGNIFICANT CHANGE UP (ref 96–108)
CHLORIDE SERPL-SCNC: 97 MMOL/L — SIGNIFICANT CHANGE UP (ref 96–108)
CO2 SERPL-SCNC: 28 MMOL/L — SIGNIFICANT CHANGE UP (ref 22–31)
CO2 SERPL-SCNC: 29 MMOL/L — SIGNIFICANT CHANGE UP (ref 22–31)
CREAT SERPL-MCNC: 0.8 MG/DL — SIGNIFICANT CHANGE UP (ref 0.5–1.3)
CREAT SERPL-MCNC: 0.83 MG/DL — SIGNIFICANT CHANGE UP (ref 0.5–1.3)
EGFR: 93 ML/MIN/1.73M2 — SIGNIFICANT CHANGE UP
EGFR: 94 ML/MIN/1.73M2 — SIGNIFICANT CHANGE UP
GAS PNL BLDA: SIGNIFICANT CHANGE UP
GLUCOSE BLDC GLUCOMTR-MCNC: 100 MG/DL — HIGH (ref 70–99)
GLUCOSE BLDC GLUCOMTR-MCNC: 102 MG/DL — HIGH (ref 70–99)
GLUCOSE BLDC GLUCOMTR-MCNC: 125 MG/DL — HIGH (ref 70–99)
GLUCOSE BLDC GLUCOMTR-MCNC: 92 MG/DL — SIGNIFICANT CHANGE UP (ref 70–99)
GLUCOSE SERPL-MCNC: 103 MG/DL — HIGH (ref 70–99)
GLUCOSE SERPL-MCNC: 134 MG/DL — HIGH (ref 70–99)
HCT VFR BLD CALC: 32.3 % — LOW (ref 39–50)
HCT VFR BLD CALC: 35 % — LOW (ref 39–50)
HGB BLD-MCNC: 10.9 G/DL — LOW (ref 13–17)
HGB BLD-MCNC: 11.8 G/DL — LOW (ref 13–17)
INR BLD: 1.27 — HIGH (ref 0.88–1.16)
LACTATE SERPL-SCNC: 0.7 MMOL/L — SIGNIFICANT CHANGE UP (ref 0.5–2)
LACTATE SERPL-SCNC: 1.2 MMOL/L — SIGNIFICANT CHANGE UP (ref 0.5–2)
MAGNESIUM SERPL-MCNC: 1.8 MG/DL — SIGNIFICANT CHANGE UP (ref 1.6–2.6)
MAGNESIUM SERPL-MCNC: 2.1 MG/DL — SIGNIFICANT CHANGE UP (ref 1.6–2.6)
MCHC RBC-ENTMCNC: 30.8 PG — SIGNIFICANT CHANGE UP (ref 27–34)
MCHC RBC-ENTMCNC: 31 PG — SIGNIFICANT CHANGE UP (ref 27–34)
MCHC RBC-ENTMCNC: 33.7 GM/DL — SIGNIFICANT CHANGE UP (ref 32–36)
MCHC RBC-ENTMCNC: 33.7 GM/DL — SIGNIFICANT CHANGE UP (ref 32–36)
MCV RBC AUTO: 91.4 FL — SIGNIFICANT CHANGE UP (ref 80–100)
MCV RBC AUTO: 91.8 FL — SIGNIFICANT CHANGE UP (ref 80–100)
NRBC # BLD: 0 /100 WBCS — SIGNIFICANT CHANGE UP (ref 0–0)
NRBC # BLD: 0 /100 WBCS — SIGNIFICANT CHANGE UP (ref 0–0)
PHOSPHATE SERPL-MCNC: 2.4 MG/DL — LOW (ref 2.5–4.5)
PHOSPHATE SERPL-MCNC: 2.5 MG/DL — SIGNIFICANT CHANGE UP (ref 2.5–4.5)
PLATELET # BLD AUTO: 174 K/UL — SIGNIFICANT CHANGE UP (ref 150–400)
PLATELET # BLD AUTO: 206 K/UL — SIGNIFICANT CHANGE UP (ref 150–400)
POTASSIUM SERPL-MCNC: 4 MMOL/L — SIGNIFICANT CHANGE UP (ref 3.5–5.3)
POTASSIUM SERPL-MCNC: 4.3 MMOL/L — SIGNIFICANT CHANGE UP (ref 3.5–5.3)
POTASSIUM SERPL-SCNC: 4 MMOL/L — SIGNIFICANT CHANGE UP (ref 3.5–5.3)
POTASSIUM SERPL-SCNC: 4.3 MMOL/L — SIGNIFICANT CHANGE UP (ref 3.5–5.3)
PROT SERPL-MCNC: 5.4 G/DL — LOW (ref 6–8.3)
PROT SERPL-MCNC: 7 G/DL — SIGNIFICANT CHANGE UP (ref 6–8.3)
PROTHROM AB SERPL-ACNC: 15.1 SEC — HIGH (ref 10.5–13.4)
RBC # BLD: 3.52 M/UL — LOW (ref 4.2–5.8)
RBC # BLD: 3.83 M/UL — LOW (ref 4.2–5.8)
RBC # FLD: 12.9 % — SIGNIFICANT CHANGE UP (ref 10.3–14.5)
RBC # FLD: 13.2 % — SIGNIFICANT CHANGE UP (ref 10.3–14.5)
SODIUM SERPL-SCNC: 135 MMOL/L — SIGNIFICANT CHANGE UP (ref 135–145)
SODIUM SERPL-SCNC: 137 MMOL/L — SIGNIFICANT CHANGE UP (ref 135–145)
URATE SERPL-MCNC: 5.1 MG/DL — SIGNIFICANT CHANGE UP (ref 3.4–8.8)
WBC # BLD: 9.22 K/UL — SIGNIFICANT CHANGE UP (ref 3.8–10.5)
WBC # BLD: 9.71 K/UL — SIGNIFICANT CHANGE UP (ref 3.8–10.5)
WBC # FLD AUTO: 9.22 K/UL — SIGNIFICANT CHANGE UP (ref 3.8–10.5)
WBC # FLD AUTO: 9.71 K/UL — SIGNIFICANT CHANGE UP (ref 3.8–10.5)

## 2023-01-15 PROCEDURE — 99292 CRITICAL CARE ADDL 30 MIN: CPT

## 2023-01-15 PROCEDURE — 99291 CRITICAL CARE FIRST HOUR: CPT

## 2023-01-15 PROCEDURE — 99233 SBSQ HOSP IP/OBS HIGH 50: CPT

## 2023-01-15 PROCEDURE — 99231 SBSQ HOSP IP/OBS SF/LOW 25: CPT

## 2023-01-15 PROCEDURE — 71045 X-RAY EXAM CHEST 1 VIEW: CPT | Mod: 26

## 2023-01-15 RX ORDER — MAGNESIUM SULFATE 500 MG/ML
2 VIAL (ML) INJECTION ONCE
Refills: 0 | Status: COMPLETED | OUTPATIENT
Start: 2023-01-15 | End: 2023-01-15

## 2023-01-15 RX ORDER — FUROSEMIDE 40 MG
20 TABLET ORAL ONCE
Refills: 0 | Status: COMPLETED | OUTPATIENT
Start: 2023-01-15 | End: 2023-01-15

## 2023-01-15 RX ORDER — ALLOPURINOL 300 MG
100 TABLET ORAL DAILY
Refills: 0 | Status: DISCONTINUED | OUTPATIENT
Start: 2023-01-15 | End: 2023-01-18

## 2023-01-15 RX ORDER — COLCHICINE 0.6 MG
0.6 TABLET ORAL
Refills: 0 | Status: DISCONTINUED | OUTPATIENT
Start: 2023-01-15 | End: 2023-01-18

## 2023-01-15 RX ORDER — APIXABAN 2.5 MG/1
5 TABLET, FILM COATED ORAL EVERY 12 HOURS
Refills: 0 | Status: DISCONTINUED | OUTPATIENT
Start: 2023-01-15 | End: 2023-01-18

## 2023-01-15 RX ADMIN — Medication 650 MILLIGRAM(S): at 10:38

## 2023-01-15 RX ADMIN — Medication 20 MILLIGRAM(S): at 17:42

## 2023-01-15 RX ADMIN — Medication 100 MILLIGRAM(S): at 15:41

## 2023-01-15 RX ADMIN — OXYCODONE HYDROCHLORIDE 5 MILLIGRAM(S): 5 TABLET ORAL at 17:45

## 2023-01-15 RX ADMIN — LIDOCAINE 1 PATCH: 4 CREAM TOPICAL at 06:00

## 2023-01-15 RX ADMIN — Medication 16.8 MICROGRAM(S)/KG/MIN: at 21:58

## 2023-01-15 RX ADMIN — SIMETHICONE 80 MILLIGRAM(S): 80 TABLET, CHEWABLE ORAL at 17:42

## 2023-01-15 RX ADMIN — APIXABAN 5 MILLIGRAM(S): 2.5 TABLET, FILM COATED ORAL at 17:42

## 2023-01-15 RX ADMIN — LIDOCAINE 1 PATCH: 4 CREAM TOPICAL at 17:06

## 2023-01-15 RX ADMIN — PANTOPRAZOLE SODIUM 40 MILLIGRAM(S): 20 TABLET, DELAYED RELEASE ORAL at 06:55

## 2023-01-15 RX ADMIN — LIDOCAINE 1 PATCH: 4 CREAM TOPICAL at 15:35

## 2023-01-15 RX ADMIN — Medication 0.6 MILLIGRAM(S): at 17:42

## 2023-01-15 RX ADMIN — Medication 650 MILLIGRAM(S): at 11:38

## 2023-01-15 RX ADMIN — OXYCODONE HYDROCHLORIDE 5 MILLIGRAM(S): 5 TABLET ORAL at 16:44

## 2023-01-15 RX ADMIN — SPIRONOLACTONE 25 MILLIGRAM(S): 25 TABLET, FILM COATED ORAL at 06:55

## 2023-01-15 RX ADMIN — Medication 20 MILLIGRAM(S): at 10:20

## 2023-01-15 RX ADMIN — LIDOCAINE 1 PATCH: 4 CREAM TOPICAL at 07:00

## 2023-01-15 RX ADMIN — OXYCODONE HYDROCHLORIDE 10 MILLIGRAM(S): 5 TABLET ORAL at 22:28

## 2023-01-15 RX ADMIN — Medication 81 MILLIGRAM(S): at 14:02

## 2023-01-15 RX ADMIN — OXYCODONE HYDROCHLORIDE 10 MILLIGRAM(S): 5 TABLET ORAL at 21:58

## 2023-01-15 RX ADMIN — Medication 25 GRAM(S): at 09:09

## 2023-01-15 RX ADMIN — HEPARIN SODIUM 5 UNIT(S)/HR: 5000 INJECTION INTRAVENOUS; SUBCUTANEOUS at 10:20

## 2023-01-15 NOTE — PROGRESS NOTE ADULT - SUBJECTIVE AND OBJECTIVE BOX
EPS Progress Note    S: no events overnight   T(C): 36.1 (01-14-23 @ 09:25), Max: 36.8 (01-13-23 @ 14:32)  HR: 98 (01-14-23 @ 11:00) (82 - 115)  BP: 124/79 (01-14-23 @ 10:00) (124/79 - 124/79)  RR: 18 (01-14-23 @ 11:00) (15 - 20)  SpO2: 97% (01-14-23 @ 11:00) (94% - 100%)  Wt(kg): --     TELE: sinus tach with first degree AV block; this has improved compared to 01/14    PHYSICAL  Pulm:  diminished.   Cardiac:   + s1/s2, regular.   GI:  +BS , soft  Vascular: No LE edema  Neuro: AAO x 3          MEDICATIONS  (STANDING):  aspirin enteric coated 81 milliGRAM(s) Oral daily  chlorhexidine 2% Cloths 1 Application(s) Topical daily  dexMEDEtomidine Infusion 0.2 MICROgram(s)/kG/Hr (5.6 mL/Hr) IV Continuous <Continuous>  dextrose 5%. 1000 milliLiter(s) (100 mL/Hr) IV Continuous <Continuous>  dextrose 5%. 1000 milliLiter(s) (50 mL/Hr) IV Continuous <Continuous>  dextrose 50% Injectable 25 Gram(s) IV Push once  dextrose 50% Injectable 12.5 Gram(s) IV Push once  dextrose 50% Injectable 25 Gram(s) IV Push once  DOBUTamine Infusion 5 MICROgram(s)/kG/Min (16.8 mL/Hr) IV Continuous <Continuous>  glucagon  Injectable 1 milliGRAM(s) IntraMuscular once  heparin  Infusion 500 Unit(s)/Hr (5 mL/Hr) IV Continuous <Continuous>  insulin lispro (ADMELOG) corrective regimen sliding scale   SubCutaneous Before meals and at bedtime  milrinone Infusion 0.375 MICROgram(s)/kG/Min (12.6 mL/Hr) IV Continuous <Continuous>  pantoprazole    Tablet 40 milliGRAM(s) Oral before breakfast  polyethylene glycol 3350 17 Gram(s) Oral at bedtime  sodium chloride 0.9%. 1000 milliLiter(s) (10 mL/Hr) IV Continuous <Continuous>    MEDICATIONS  (PRN):  acetaminophen     Tablet .. 650 milliGRAM(s) Oral every 6 hours PRN Mild Pain (1 - 3)  dextrose Oral Gel 15 Gram(s) Oral once PRN Blood Glucose LESS THAN 70 milliGRAM(s)/deciliter  fentaNYL    Injectable 25 MICROGram(s) IV Push every 3 hours PRN Severe Pain (7 - 10)  oxyCODONE    IR 5 milliGRAM(s) Oral every 4 hours PRN Moderate Pain (4 - 6)  oxyCODONE    IR 10 milliGRAM(s) Oral every 6 hours PRN Severe Pain (7 - 10)  simethicone 80 milliGRAM(s) Chew every 6 hours PRN Gas                                                         12.0   11.34 )-----------( 165      ( 14 Jan 2023 10:42 )             35.3     01-14    137  |  98  |  18  ----------------------------<  107<H>  3.8   |  30  |  0.84    Ca    8.7      14 Jan 2023 03:09  Phos  2.3     01-14  Mg     1.8     01-14    TPro  6.3  /  Alb  3.5  /  TBili  1.4<H>  /  DBili  x   /  AST  28  /  ALT  15  /  AlkPhos  37<L>  01-14    PT/INR - ( 14 Jan 2023 10:42 )   PT: 15.0 sec;   INR: 1.26          PTT - ( 14 Jan 2023 10:42 )  PTT:28.2 sec

## 2023-01-15 NOTE — PROGRESS NOTE ADULT - ASSESSMENT
73 y/o overweight M with history of HTN, paroxysmal atrial fibrillation (on coumadin), renal calculi, gout, hemochromatosis, nonischemic cardiomyopathy (EF: 50-55% on echo in 11/2022), and severe mitral regurgitation.  s/p MV repair, MAZE, AtriClip on 1/10/23.  Intraop LVEF 25-30%.  POD1 pt was in/out of AFIB.  POD 2 with AV dissociation but had a stable accelerated junctional rhythm of 80s.  Not required to use backup epicardial pacing.    Is in sinus rhythm with 1:1 conduction and a prolonged AK interval but this has continued to improve with shortening of the AK interval over time.  No acute indication for permanent pacing at this time.

## 2023-01-15 NOTE — PROGRESS NOTE ADULT - SUBJECTIVE AND OBJECTIVE BOX
INTERVAL EVENTS:    PAST MEDICAL & SURGICAL HISTORY:  Hypertension    Renal calculi    Gout    History of hemochromatosis    Nonischemic cardiomyopathy    Mitral regurgitation    History of cholecystectomy        MEDICATIONS  (STANDING):  allopurinol 100 milliGRAM(s) Oral daily  apixaban 5 milliGRAM(s) Oral every 12 hours  aspirin enteric coated 81 milliGRAM(s) Oral daily  chlorhexidine 2% Cloths 1 Application(s) Topical daily  colchicine 0.6 milliGRAM(s) Oral two times a day  dextrose 5%. 1000 milliLiter(s) (100 mL/Hr) IV Continuous <Continuous>  dextrose 5%. 1000 milliLiter(s) (50 mL/Hr) IV Continuous <Continuous>  dextrose 50% Injectable 25 Gram(s) IV Push once  dextrose 50% Injectable 12.5 Gram(s) IV Push once  dextrose 50% Injectable 25 Gram(s) IV Push once  DOBUTamine Infusion 5 MICROgram(s)/kG/Min (16.8 mL/Hr) IV Continuous <Continuous>  furosemide   Injectable 20 milliGRAM(s) IV Push once  glucagon  Injectable 1 milliGRAM(s) IntraMuscular once  insulin lispro (ADMELOG) corrective regimen sliding scale   SubCutaneous Before meals and at bedtime  lidocaine   4% Patch 1 Patch Transdermal daily  milrinone Infusion 0.375 MICROgram(s)/kG/Min (12.6 mL/Hr) IV Continuous <Continuous>  pantoprazole    Tablet 40 milliGRAM(s) Oral before breakfast  polyethylene glycol 3350 17 Gram(s) Oral at bedtime  sodium chloride 0.9%. 1000 milliLiter(s) (10 mL/Hr) IV Continuous <Continuous>  spironolactone 25 milliGRAM(s) Oral daily    MEDICATIONS  (PRN):  acetaminophen     Tablet .. 650 milliGRAM(s) Oral every 6 hours PRN Mild Pain (1 - 3)  dextrose Oral Gel 15 Gram(s) Oral once PRN Blood Glucose LESS THAN 70 milliGRAM(s)/deciliter  fentaNYL    Injectable 25 MICROGram(s) IV Push every 3 hours PRN Severe Pain (7 - 10)  oxyCODONE    IR 5 milliGRAM(s) Oral every 4 hours PRN Moderate Pain (4 - 6)  oxyCODONE    IR 10 milliGRAM(s) Oral every 6 hours PRN Severe Pain (7 - 10)  simethicone 80 milliGRAM(s) Chew every 6 hours PRN Gas      Vital Signs Last 24 Hrs  T(C): 36.9 (15 Stefano 2023 14:22), Max: 36.9 (15 Stefano 2023 14:22)  T(F): 98.4 (15 Stefano 2023 14:22), Max: 98.4 (15 Stefano 2023 14:22)  HR: 80 (15 Stefano 2023 15:20) (71 - 93)  BP: 127/71 (15 Stefano 2023 14:00) (103/62 - 132/68)  BP(mean): 91 (15 Stefano 2023 14:00) (78 - 94)  RR: 16 (15 Stefano 2023 15:20) (12 - 24)  SpO2: 100% (15 Stefano 2023 15:20) (97% - 100%)    Parameters below as of 15 Stefano 2023 15:20  Patient On (Oxygen Delivery Method): nasal cannula w/ humidification  O2 Flow (L/min): 6       PHYSICAL EXAM:  GEN: Awake, alert. NAD.   HEENT: NCAT, PERRL, EOMI. Mucosa moist. No JVD.  RESP: CTA b/l  CV: RRR. Normal S1/S2. No m/r/g.  ABD: Soft. NT/ND. BS+  EXT: Warm. No edema, clubbing, or cyanosis.   NEURO: AAOx3. No focal deficits.     LABS:                        11.8   9.71  )-----------( 206      ( 15 Stefano 2023 11:48 )             35.0     01-15    135  |  97  |  20  ----------------------------<  134<H>  4.3   |  29  |  0.83    Ca    8.7      15 Stefano 2023 11:52  Phos  2.5     01-15  Mg     2.1     01-15    TPro  7.0  /  Alb  3.6  /  TBili  1.2  /  DBili  x   /  AST  36  /  ALT  25  /  AlkPhos  43  01-15        PT/INR - ( 15 Stefano 2023 11:48 )   PT: 15.1 sec;   INR: 1.27          PTT - ( 15 Stefano 2023 11:48 )  PTT:27.5 sec    I&O's Summary    14 Jan 2023 07:01  -  15 Stefano 2023 07:00  --------------------------------------------------------  IN: 1483.2 mL / OUT: 3105 mL / NET: -1621.8 mL    15 Stefano 2023 07:01  -  15 Stefano 2023 15:48  --------------------------------------------------------  IN: 296.1 mL / OUT: 1150 mL / NET: -853.9 mL      BNP  RADIOLOGY & ADDITIONAL STUDIES:    TELEMETRY:    EKG:         INTERVAL EVENTS:  [place]. Seen watching soccer on ipad in [place]. No cp or sob.    PAST MEDICAL & SURGICAL HISTORY:  Hypertension    Renal calculi    Gout    History of hemochromatosis    Nonischemic cardiomyopathy    Mitral regurgitation    History of cholecystectomy        MEDICATIONS  (STANDING):  allopurinol 100 milliGRAM(s) Oral daily  apixaban 5 milliGRAM(s) Oral every 12 hours  aspirin enteric coated 81 milliGRAM(s) Oral daily  chlorhexidine 2% Cloths 1 Application(s) Topical daily  colchicine 0.6 milliGRAM(s) Oral two times a day  dextrose 5%. 1000 milliLiter(s) (100 mL/Hr) IV Continuous <Continuous>  dextrose 5%. 1000 milliLiter(s) (50 mL/Hr) IV Continuous <Continuous>  dextrose 50% Injectable 25 Gram(s) IV Push once  dextrose 50% Injectable 12.5 Gram(s) IV Push once  dextrose 50% Injectable 25 Gram(s) IV Push once  DOBUTamine Infusion 5 MICROgram(s)/kG/Min (16.8 mL/Hr) IV Continuous <Continuous>  furosemide   Injectable 20 milliGRAM(s) IV Push once  glucagon  Injectable 1 milliGRAM(s) IntraMuscular once  insulin lispro (ADMELOG) corrective regimen sliding scale   SubCutaneous Before meals and at bedtime  lidocaine   4% Patch 1 Patch Transdermal daily  milrinone Infusion 0.375 MICROgram(s)/kG/Min (12.6 mL/Hr) IV Continuous <Continuous>  pantoprazole    Tablet 40 milliGRAM(s) Oral before breakfast  polyethylene glycol 3350 17 Gram(s) Oral at bedtime  sodium chloride 0.9%. 1000 milliLiter(s) (10 mL/Hr) IV Continuous <Continuous>  spironolactone 25 milliGRAM(s) Oral daily    MEDICATIONS  (PRN):  acetaminophen     Tablet .. 650 milliGRAM(s) Oral every 6 hours PRN Mild Pain (1 - 3)  dextrose Oral Gel 15 Gram(s) Oral once PRN Blood Glucose LESS THAN 70 milliGRAM(s)/deciliter  fentaNYL    Injectable 25 MICROGram(s) IV Push every 3 hours PRN Severe Pain (7 - 10)  oxyCODONE    IR 5 milliGRAM(s) Oral every 4 hours PRN Moderate Pain (4 - 6)  oxyCODONE    IR 10 milliGRAM(s) Oral every 6 hours PRN Severe Pain (7 - 10)  simethicone 80 milliGRAM(s) Chew every 6 hours PRN Gas      Vital Signs Last 24 Hrs  T(C): 36.9 (15 Stefano 2023 14:22), Max: 36.9 (15 Stefano 2023 14:22)  T(F): 98.4 (15 Stefano 2023 14:22), Max: 98.4 (15 Stefano 2023 14:22)  HR: 80 (15 Stefano 2023 15:20) (71 - 93)  BP: 127/71 (15 Stefano 2023 14:00) (103/62 - 132/68)  BP(mean): 91 (15 Stefano 2023 14:00) (78 - 94)  RR: 16 (15 Stefano 2023 15:20) (12 - 24)  SpO2: 100% (15 Stefano 2023 15:20) (97% - 100%)    Parameters below as of 15 Stefano 2023 15:20  Patient On (Oxygen Delivery Method): nasal cannula w/ humidification  O2 Flow (L/min): 6       PHYSICAL EXAM:  GEN: Awake, alert. NAD.   HEENT: NCAT EOMI. Mucosa moist. JVP obscured. RIJ central line.  RESP: CTA b/l  CV: RRR. Normal S1/S2. No m/r/g.  ABD: Soft. +distended, non tender BS+  EXT: Warm. No edema, clubbing, or cyanosis.   NEURO: AAOx3. No focal deficits.     LABS:                        11.8   9.71  )-----------( 206      ( 15 Stefano 2023 11:48 )             35.0     01-15    135  |  97  |  20  ----------------------------<  134<H>  4.3   |  29  |  0.83    Ca    8.7      15 Stefano 2023 11:52  Phos  2.5     01-15  Mg     2.1     01-15    TPro  7.0  /  Alb  3.6  /  TBili  1.2  /  DBili  x   /  AST  36  /  ALT  25  /  AlkPhos  43  01-15        PT/INR - ( 15 Stefano 2023 11:48 )   PT: 15.1 sec;   INR: 1.27          PTT - ( 15 Stefano 2023 11:48 )  PTT:27.5 sec    I&O's Summary    14 Jan 2023 07:01  -  15 Stefano 2023 07:00  --------------------------------------------------------  IN: 1483.2 mL / OUT: 3105 mL / NET: -1621.8 mL    15 Stefano 2023 07:01  -  15 Stefano 2023 15:48  --------------------------------------------------------  IN: 296.1 mL / OUT: 1150 mL / NET: -853.9 mL

## 2023-01-15 NOTE — PROGRESS NOTE ADULT - SUBJECTIVE AND OBJECTIVE BOX
INTERVAl COURSE  POD# 5  MV repair  weaning Inotrops, Mil down to 0.125/  at 2.5  Normal Lactate and LFTs, good UOP in response to diuretics  Complaining of left knee pain, warm and mildly erythematous on exam,  Labs stable and mostly in normal range     VITALS  Vital Signs Last 24 Hrs  T(C): 36.4 (15 Stefano 2023 09:13), Max: 36.7 (2023 17:15)  T(F): 97.5 (15 Stefano 2023 09:13), Max: 98.1 (2023 17:15)  HR: 77 (15 Stefano 2023 10:00) (71 - 110)  BP: 121/64 (15 Stefano 2023 10:00) (117/63 - 121/64)  BP(mean): 87 (15 Stefano 2023 10:00) (84 - 87)  RR: 20 (15 Stefano 2023 10:00) (16 - 24)  SpO2: 99% (15 Stefano 2023 10:00) (95% - 100%)  Parameters below as of 15 Stefano 2023 10:00  Patient On (Oxygen Delivery Method): nasal cannula w/ humidification  O2 Flow (L/min): 6    I&O's Summary    2023 07:01  -  15 Stefano 2023 07:00  --------------------------------------------------------  IN: 1483.2 mL / OUT: 3105 mL / NET: -1621.8 mL    15 Stefano 2023 07:01  -  15 Stefano 2023 13:00  --------------------------------------------------------  IN: 296.1 mL / OUT: 850 mL / NET: -553.9 mL    CAPILLARY BLOOD GLUCOSE      POCT Blood Glucose.: 125 mg/dL (15 Stefano 2023 12:02)  POCT Blood Glucose.: 100 mg/dL (15 Stefano 2023 06:56)  POCT Blood Glucose.: 101 mg/dL (2023 22:53)  POCT Blood Glucose.: 120 mg/dL (2023 16:44)      PHYSICAL EXAM  General: A&Ox 3; NAD  Respiratory: CTA B/L; no wheezes/crackles  Cardiovascular: In sinus with 1st degree AVB  Gastrointestinal: Soft; NTND   Extremities: WWP; no edema, left knee red and warm mildly tender on exam   Neurological:  CNII-XII grossly intact; no obvious focal deficits    MEDICATIONS  (STANDING):  allopurinol 100 milliGRAM(s) Oral daily  aspirin enteric coated 81 milliGRAM(s) Oral daily  chlorhexidine 2% Cloths 1 Application(s) Topical daily  colchicine 0.6 milliGRAM(s) Oral two times a day  dextrose 5%. 1000 milliLiter(s) (100 mL/Hr) IV Continuous <Continuous>  dextrose 5%. 1000 milliLiter(s) (50 mL/Hr) IV Continuous <Continuous>  dextrose 50% Injectable 25 Gram(s) IV Push once  dextrose 50% Injectable 12.5 Gram(s) IV Push once  dextrose 50% Injectable 25 Gram(s) IV Push once  DOBUTamine Infusion 5 MICROgram(s)/kG/Min (16.8 mL/Hr) IV Continuous <Continuous>  furosemide   Injectable 20 milliGRAM(s) IV Push once  glucagon  Injectable 1 milliGRAM(s) IntraMuscular once  heparin  Infusion 500 Unit(s)/Hr (5 mL/Hr) IV Continuous <Continuous>  insulin lispro (ADMELOG) corrective regimen sliding scale   SubCutaneous Before meals and at bedtime  lidocaine   4% Patch 1 Patch Transdermal daily  milrinone Infusion 0.375 MICROgram(s)/kG/Min (12.6 mL/Hr) IV Continuous <Continuous>  pantoprazole    Tablet 40 milliGRAM(s) Oral before breakfast  polyethylene glycol 3350 17 Gram(s) Oral at bedtime  sodium chloride 0.9%. 1000 milliLiter(s) (10 mL/Hr) IV Continuous <Continuous>  spironolactone 25 milliGRAM(s) Oral daily    MEDICATIONS  (PRN):  acetaminophen     Tablet .. 650 milliGRAM(s) Oral every 6 hours PRN Mild Pain (1 - 3)  dextrose Oral Gel 15 Gram(s) Oral once PRN Blood Glucose LESS THAN 70 milliGRAM(s)/deciliter  fentaNYL    Injectable 25 MICROGram(s) IV Push every 3 hours PRN Severe Pain (7 - 10)  oxyCODONE    IR 5 milliGRAM(s) Oral every 4 hours PRN Moderate Pain (4 - 6)  oxyCODONE    IR 10 milliGRAM(s) Oral every 6 hours PRN Severe Pain (7 - 10)  simethicone 80 milliGRAM(s) Chew every 6 hours PRN Gas      LABS                        11.8   9.71  )-----------( 206      ( 15 Stefano 2023 11:48 )             35.0     01-15    137  |  102  |  19  ----------------------------<  103<H>  4.0   |  28  |  0.80    Ca    7.7<L>      15 Stefano 2023 03:57  Phos  2.4     01-15  Mg     1.8     01-15    TPro  5.4<L>  /  Alb  3.4  /  TBili  1.1  /  DBili  x   /  AST  24  /  ALT  17  /  AlkPhos  34<L>  01-15    LIVER FUNCTIONS - ( 15 Stefano 2023 03:57 )  Alb: 3.4 g/dL / Pro: 5.4 g/dL / ALK PHOS: 34 U/L / ALT: 17 U/L / AST: 24 U/L / GGT: x           PT/INR - ( 15 Stefano 2023 11:48 )   PT: 15.1 sec;   INR: 1.27          PTT - ( 15 Stefano 2023 11:48 )  PTT:27.5 sec      IMAGING/EKG/ETC  Reviewed.

## 2023-01-15 NOTE — PROGRESS NOTE ADULT - ASSESSMENT
72M Former smoker, Hx of hemochromatosis, HTN, AFib (on coumadin), NICM with severe MR presented for elective mitral valve repair of primary severe MR with prolapse of posterior leaflet. Advanced HF consulted for further post op management.     Cardiac Studies:  LHC (1/9/23): No significan coronary disease  CARLY (11/14/22): LVEF 50-55%, severely dilated LA, severe MR prolapse of posterior leaflet w/ PV flow systolic flow reversal, mitral regurgitation jet is directed toward septum, no LA/CHERI thrombus    Cardioggenic shock:   - currently on  and milrinone  - favor discontinuation of  and continue with milrinone   - will add hydralazine 10 mg TID for afterload reduction  - plan to increase afterload and wean milrinone further tomorrow  - Goal CVP 6-8 mmHg    Chronic HFmrEF:   Etiology: NICM, LVEF borderline 50-55%  GDMT: Continue w/ spironolactone 25 mg qd, Will slowly reintroduce rest as tolerated

## 2023-01-15 NOTE — PROGRESS NOTE ADULT - SUBJECTIVE AND OBJECTIVE BOX
CTICU  CRITICAL  CARE  attending     Hand off received 					   Pertinent clinical, laboratory, radiographic, hemodynamic, echocardiographic, respiratory data, microbiologic data and chart were reviewed and analyzed frequently throughout the course of the day and night    72 years old  male (former smoker), with HTN,  atrial fibrillation (on coumadin), renal calculi, gout, hemochromatosis (managed by Dr. Tenzin Gomez), nonischemic cardiomyopathy.  He was evaluated for CHF.  ECHO: Severe mitral regurgitation. Prolapse of the posterior mitral leaflet. Systolic flow reversal in the pulmonary veins. Severely dilated Left atrium.   He presented to Dr. Atkins, for his valvular disease.   S/P Mitral valve repair.  S/P cryo maze  S/P Occlusion of LA appendage.      FAMILY HISTORY:  FH: arthritis (Mother)    FH: glaucoma (Sibling)    FH: hypothyroidism (Sibling)    PAST MEDICAL & SURGICAL HISTORY:  Hypertension  Renal calculi  Gout  History of hemochromatosis  Nonischemic cardiomyopathy  Mitral regurgitation  History of cholecystectomy      14 system review was unremarkable    Vital signs, hemodynamic and respiratory parameters were reviewed from the bedside nursing flow sheet.  ICU Vital Signs Last 24 Hrs  T(C): 36.8 (15 Stefano 2023 17:49), Max: 36.9 (15 Stefano 2023 14:22)  T(F): 98.3 (15 Stefano 2023 17:49), Max: 98.4 (15 Stefano 2023 14:22)  HR: 88 (15 Stefano 2023 19:00) (71 - 93)  BP: 125/67 (15 Stefano 2023 19:00) (103/62 - 135/80)  BP(mean): 88 (15 Stefano 2023 19:00) (78 - 103)  ABP: 118/60 (15 Stefano 2023 19:00) (106/62 - 165/73)  ABP(mean): 79 (15 Stefano 2023 19:00) (70 - 106)  RR: 12 (15 Stefano 2023 19:00) (12 - 24)  SpO2: 95% (15 Stefano 2023 19:00) (95% - 100%)    O2 Parameters below as of 15 Stefano 2023 20:00  Patient On (Oxygen Delivery Method): room air          Adult Advanced Hemodynamics Last 24 Hrs  CVP(mm Hg): 3 (15 Stefano 2023 19:00) (3 - 17)  CVP(cm H2O): --  CO: --  CI: --  PA: --  PA(mean): --  PCWP: --  SVR: --  SVRI: --  PVR: --  PVRI: --, ABG - ( 15 Stefano 2023 12:03 )  pH, Arterial: 7.44  pH, Blood: x     /  pCO2: 40    /  pO2: 165   / HCO3: 27    / Base Excess: 2.8   /  SaO2: 99.5                Intake and output was reviewed and the fluid balance was calculated  Daily     Daily Weight in k.5 (15 Stefano 2023 05:00)  I&O's Summary    2023 07:01  -  15 Stefano 2023 07:00  --------------------------------------------------------  IN: 1483.2 mL / OUT: 3105 mL / NET: -1621.8 mL    15 Stefano 2023 07:01  -  15 Stefano 2023 20:27  --------------------------------------------------------  IN: 651.5 mL / OUT: 1850 mL / NET: -1198.5 mL        All lines and drain sites were assessed      Neuro: No postoperative changes.  Neck: No JVD.  CVS: S1, S2, No S3.  Lungs: Good air entry bilaterally.  Abd: Soft. No tenderness. + Bowel sounds.  Vascular: + DP/PT.  Extremities: No edema.  Lymphatic: Normal.  Skin: No abnormalities.      labs  CBC Full  -  ( 15 Stefano 2023 11:48 )  WBC Count : 9.71 K/uL  RBC Count : 3.83 M/uL  Hemoglobin : 11.8 g/dL  Hematocrit : 35.0 %  Platelet Count - Automated : 206 K/uL  Mean Cell Volume : 91.4 fl  Mean Cell Hemoglobin : 30.8 pg  Mean Cell Hemoglobin Concentration : 33.7 gm/dL  Auto Neutrophil # : x  Auto Lymphocyte # : x  Auto Monocyte # : x  Auto Eosinophil # : x  Auto Basophil # : x  Auto Neutrophil % : x  Auto Lymphocyte % : x  Auto Monocyte % : x  Auto Eosinophil % : x  Auto Basophil % : x    01-15    135  |  97  |  20  ----------------------------<  134<H>  4.3   |  29  |  0.83    Ca    8.7      15 Stefano 2023 11:52  Phos  2.5     01-15  Mg     2.1     01-15    TPro  7.0  /  Alb  3.6  /  TBili  1.2  /  DBili  x   /  AST  36  /  ALT  25  /  AlkPhos  43  01-15    PT/INR - ( 15 Stefano 2023 11:48 )   PT: 15.1 sec;   INR: 1.27          PTT - ( 15 Stefano 2023 11:48 )  PTT:27.5 sec  The current medications were reviewed   MEDICATIONS  (STANDING):  allopurinol 100 milliGRAM(s) Oral daily  apixaban 5 milliGRAM(s) Oral every 12 hours  aspirin enteric coated 81 milliGRAM(s) Oral daily  chlorhexidine 2% Cloths 1 Application(s) Topical daily  colchicine 0.6 milliGRAM(s) Oral two times a day  dextrose 5%. 1000 milliLiter(s) (100 mL/Hr) IV Continuous <Continuous>  dextrose 5%. 1000 milliLiter(s) (50 mL/Hr) IV Continuous <Continuous>  dextrose 50% Injectable 25 Gram(s) IV Push once  dextrose 50% Injectable 12.5 Gram(s) IV Push once  dextrose 50% Injectable 25 Gram(s) IV Push once  DOBUTamine Infusion 5 MICROgram(s)/kG/Min (16.8 mL/Hr) IV Continuous <Continuous>  glucagon  Injectable 1 milliGRAM(s) IntraMuscular once  insulin lispro (ADMELOG) corrective regimen sliding scale   SubCutaneous Before meals and at bedtime  lidocaine   4% Patch 1 Patch Transdermal daily  milrinone Infusion 0.375 MICROgram(s)/kG/Min (12.6 mL/Hr) IV Continuous <Continuous>  pantoprazole    Tablet 40 milliGRAM(s) Oral before breakfast  polyethylene glycol 3350 17 Gram(s) Oral at bedtime  sodium chloride 0.9%. 1000 milliLiter(s) (10 mL/Hr) IV Continuous <Continuous>  spironolactone 25 milliGRAM(s) Oral daily    MEDICATIONS  (PRN):  acetaminophen     Tablet .. 650 milliGRAM(s) Oral every 6 hours PRN Mild Pain (1 - 3)  dextrose Oral Gel 15 Gram(s) Oral once PRN Blood Glucose LESS THAN 70 milliGRAM(s)/deciliter  fentaNYL    Injectable 25 MICROGram(s) IV Push every 3 hours PRN Severe Pain (7 - 10)  oxyCODONE    IR 5 milliGRAM(s) Oral every 4 hours PRN Moderate Pain (4 - 6)  oxyCODONE    IR 10 milliGRAM(s) Oral every 6 hours PRN Severe Pain (7 - 10)  simethicone 80 milliGRAM(s) Chew every 6 hours PRN Gas        72 years old  Male with severe mitral regurgitation in the setting of severely compromised left ventricular function.  S/P mitral valve repair.  S/P Cryoablation.  Hemodynamically stable.  Good oxygenation.  Fair urine out put.        My plan includes :  WEAN off dobutamine.  Continue low dose milrinone.   Statin Rx.  ASA and Apixaban  Aldactone and furosemide.  PO afterload reduction.  Close hemodynamic monitoring.  Monitor for arrhythmias and monitor parameters for organ perfusion  Monitor neurologic status  Monitor renal function.  Head of the bed should remain elevated to 45 deg .   Chest PT and IS will be encouraged  Monitor adequacy of oxygenation and ventilation and attempt to wean oxygen  Nutritional goals will be met using po eventually , ensure adequate caloric intake and monitor the same  Stress ulcer and VTE prophylaxis will be achieved    Glycemic control is satisfactory  Electrolytes have been repleted as necessary and wound care has been carried out. Pain control has been achieved.   Aggressive physical therapy and early mobility and ambulation goals will be met   The family was updated about the course and plan  CRITICAL CARE TIME SPENT in evaluation and management, reassessments, review and interpretation of labs and x-rays, ventilator and hemodynamic management, formulating a plan and coordinating care: ___30____ MIN.  Time does not include procedural time.  CTICU ATTENDING     					    Kamari Torres MD

## 2023-01-15 NOTE — PROGRESS NOTE ADULT - ASSESSMENT
72 M  former smoker w/PMHx of HTN,  atrial fibrillation on coumadin, gout, hemochromatosis and severe mitral regurgitation admitted  for cardiac catheterization to r/o ischemic disease prior to surgical mgmt of MR via sternotomy with CryoMaze and Mitral clip.  Nonischemic CMP EF ~ 50%    S/p Mitral valve repair/ Maze and CHERI occlusion  EF 25-30 %   1/10  Received intubated and sedated  On Levo/Vaso/  and Mil  extubated POD 1     ASSESSMENT/PLAN  Adequate pain control with tylenol/ Colchicine added for possible left knee acute gout attack/ UA level pending   On home allopurinol   In sinus with 1st degree AVB/ Post up dysrhythmia acc [facility]--> 1st degree with improving KS intervals, monitor for recovery no acute interventions as of yet, Appreciate EP recs    In terms of inotrops plan for  wean today, continue low dose Mil 0.125--holding Hydral until tmr   Continue lasix/ spironolactone   Good UOP with brisk response to lasix -- will monitor and redose for Neg 1-1.5 L FB per day   To dc hep gtt and start Eliquis post Maze   Good kidney function/lytes at goal will replete for K>4 and Mg>2   Non diabetic, Normoglycemic--> liberalize diet from carb restriciton    CTs and mcmahon dc'd   Leave [place] and [place] one more day as weaning inotrops  ( day 5 )    Ppx: Eliquis/ PPI/ HOB 45   OOB and mobilizing   wean  Continue night Bipap     ATTENDING: I have personally and independently provided 90  Min of critical care services. this excludes time spent on any procedures or teaching.

## 2023-01-16 LAB
ALBUMIN SERPL ELPH-MCNC: 3.3 G/DL — SIGNIFICANT CHANGE UP (ref 3.3–5)
ALBUMIN SERPL ELPH-MCNC: 3.4 G/DL — SIGNIFICANT CHANGE UP (ref 3.3–5)
ALBUMIN SERPL ELPH-MCNC: 3.7 G/DL — SIGNIFICANT CHANGE UP (ref 3.3–5)
ALP SERPL-CCNC: 41 U/L — SIGNIFICANT CHANGE UP (ref 40–120)
ALP SERPL-CCNC: 48 U/L — SIGNIFICANT CHANGE UP (ref 40–120)
ALP SERPL-CCNC: 50 U/L — SIGNIFICANT CHANGE UP (ref 40–120)
ALT FLD-CCNC: 28 U/L — SIGNIFICANT CHANGE UP (ref 10–45)
ALT FLD-CCNC: 30 U/L — SIGNIFICANT CHANGE UP (ref 10–45)
ALT FLD-CCNC: 32 U/L — SIGNIFICANT CHANGE UP (ref 10–45)
ANION GAP SERPL CALC-SCNC: 10 MMOL/L — SIGNIFICANT CHANGE UP (ref 5–17)
ANION GAP SERPL CALC-SCNC: 13 MMOL/L — SIGNIFICANT CHANGE UP (ref 5–17)
ANION GAP SERPL CALC-SCNC: 6 MMOL/L — SIGNIFICANT CHANGE UP (ref 5–17)
APTT BLD: 29 SEC — SIGNIFICANT CHANGE UP (ref 27.5–35.5)
APTT BLD: 30.2 SEC — SIGNIFICANT CHANGE UP (ref 27.5–35.5)
AST SERPL-CCNC: 30 U/L — SIGNIFICANT CHANGE UP (ref 10–40)
AST SERPL-CCNC: 31 U/L — SIGNIFICANT CHANGE UP (ref 10–40)
AST SERPL-CCNC: 36 U/L — SIGNIFICANT CHANGE UP (ref 10–40)
BILIRUB SERPL-MCNC: 0.9 MG/DL — SIGNIFICANT CHANGE UP (ref 0.2–1.2)
BILIRUB SERPL-MCNC: 1.1 MG/DL — SIGNIFICANT CHANGE UP (ref 0.2–1.2)
BILIRUB SERPL-MCNC: 1.6 MG/DL — HIGH (ref 0.2–1.2)
BLD GP AB SCN SERPL QL: NEGATIVE — SIGNIFICANT CHANGE UP
BUN SERPL-MCNC: 22 MG/DL — SIGNIFICANT CHANGE UP (ref 7–23)
BUN SERPL-MCNC: 24 MG/DL — HIGH (ref 7–23)
BUN SERPL-MCNC: 25 MG/DL — HIGH (ref 7–23)
CALCIUM SERPL-MCNC: 8.4 MG/DL — SIGNIFICANT CHANGE UP (ref 8.4–10.5)
CALCIUM SERPL-MCNC: 8.7 MG/DL — SIGNIFICANT CHANGE UP (ref 8.4–10.5)
CALCIUM SERPL-MCNC: 8.9 MG/DL — SIGNIFICANT CHANGE UP (ref 8.4–10.5)
CHLORIDE SERPL-SCNC: 94 MMOL/L — LOW (ref 96–108)
CHLORIDE SERPL-SCNC: 96 MMOL/L — SIGNIFICANT CHANGE UP (ref 96–108)
CHLORIDE SERPL-SCNC: 98 MMOL/L — SIGNIFICANT CHANGE UP (ref 96–108)
CO2 SERPL-SCNC: 24 MMOL/L — SIGNIFICANT CHANGE UP (ref 22–31)
CO2 SERPL-SCNC: 26 MMOL/L — SIGNIFICANT CHANGE UP (ref 22–31)
CO2 SERPL-SCNC: 29 MMOL/L — SIGNIFICANT CHANGE UP (ref 22–31)
CREAT SERPL-MCNC: 0.86 MG/DL — SIGNIFICANT CHANGE UP (ref 0.5–1.3)
CREAT SERPL-MCNC: 0.88 MG/DL — SIGNIFICANT CHANGE UP (ref 0.5–1.3)
CREAT SERPL-MCNC: 0.9 MG/DL — SIGNIFICANT CHANGE UP (ref 0.5–1.3)
EGFR: 91 ML/MIN/1.73M2 — SIGNIFICANT CHANGE UP
EGFR: 91 ML/MIN/1.73M2 — SIGNIFICANT CHANGE UP
EGFR: 92 ML/MIN/1.73M2 — SIGNIFICANT CHANGE UP
GAS PNL BLDA: SIGNIFICANT CHANGE UP
GLUCOSE BLDC GLUCOMTR-MCNC: 103 MG/DL — HIGH (ref 70–99)
GLUCOSE BLDC GLUCOMTR-MCNC: 103 MG/DL — HIGH (ref 70–99)
GLUCOSE BLDC GLUCOMTR-MCNC: 124 MG/DL — HIGH (ref 70–99)
GLUCOSE SERPL-MCNC: 107 MG/DL — HIGH (ref 70–99)
GLUCOSE SERPL-MCNC: 117 MG/DL — HIGH (ref 70–99)
GLUCOSE SERPL-MCNC: 143 MG/DL — HIGH (ref 70–99)
HCT VFR BLD CALC: 33.9 % — LOW (ref 39–50)
HCT VFR BLD CALC: 36.2 % — LOW (ref 39–50)
HGB BLD-MCNC: 11.5 G/DL — LOW (ref 13–17)
HGB BLD-MCNC: 12.2 G/DL — LOW (ref 13–17)
INR BLD: 1.55 — HIGH (ref 0.88–1.16)
INR BLD: 1.56 — HIGH (ref 0.88–1.16)
LACTATE SERPL-SCNC: 0.7 MMOL/L — SIGNIFICANT CHANGE UP (ref 0.5–2)
LACTATE SERPL-SCNC: 0.9 MMOL/L — SIGNIFICANT CHANGE UP (ref 0.5–2)
LACTATE SERPL-SCNC: 1.4 MMOL/L — SIGNIFICANT CHANGE UP (ref 0.5–2)
MAGNESIUM SERPL-MCNC: 1.9 MG/DL — SIGNIFICANT CHANGE UP (ref 1.6–2.6)
MAGNESIUM SERPL-MCNC: 1.9 MG/DL — SIGNIFICANT CHANGE UP (ref 1.6–2.6)
MAGNESIUM SERPL-MCNC: 2.1 MG/DL — SIGNIFICANT CHANGE UP (ref 1.6–2.6)
MCHC RBC-ENTMCNC: 30.5 PG — SIGNIFICANT CHANGE UP (ref 27–34)
MCHC RBC-ENTMCNC: 30.9 PG — SIGNIFICANT CHANGE UP (ref 27–34)
MCHC RBC-ENTMCNC: 33.7 GM/DL — SIGNIFICANT CHANGE UP (ref 32–36)
MCHC RBC-ENTMCNC: 33.9 GM/DL — SIGNIFICANT CHANGE UP (ref 32–36)
MCV RBC AUTO: 90.5 FL — SIGNIFICANT CHANGE UP (ref 80–100)
MCV RBC AUTO: 91.1 FL — SIGNIFICANT CHANGE UP (ref 80–100)
NRBC # BLD: 0 /100 WBCS — SIGNIFICANT CHANGE UP (ref 0–0)
NRBC # BLD: 0 /100 WBCS — SIGNIFICANT CHANGE UP (ref 0–0)
PHOSPHATE SERPL-MCNC: 3.3 MG/DL — SIGNIFICANT CHANGE UP (ref 2.5–4.5)
PHOSPHATE SERPL-MCNC: 3.5 MG/DL — SIGNIFICANT CHANGE UP (ref 2.5–4.5)
PHOSPHATE SERPL-MCNC: 3.8 MG/DL — SIGNIFICANT CHANGE UP (ref 2.5–4.5)
PLATELET # BLD AUTO: 200 K/UL — SIGNIFICANT CHANGE UP (ref 150–400)
PLATELET # BLD AUTO: 227 K/UL — SIGNIFICANT CHANGE UP (ref 150–400)
POTASSIUM SERPL-MCNC: 4.1 MMOL/L — SIGNIFICANT CHANGE UP (ref 3.5–5.3)
POTASSIUM SERPL-MCNC: 4.4 MMOL/L — SIGNIFICANT CHANGE UP (ref 3.5–5.3)
POTASSIUM SERPL-MCNC: 4.5 MMOL/L — SIGNIFICANT CHANGE UP (ref 3.5–5.3)
POTASSIUM SERPL-SCNC: 4.1 MMOL/L — SIGNIFICANT CHANGE UP (ref 3.5–5.3)
POTASSIUM SERPL-SCNC: 4.4 MMOL/L — SIGNIFICANT CHANGE UP (ref 3.5–5.3)
POTASSIUM SERPL-SCNC: 4.5 MMOL/L — SIGNIFICANT CHANGE UP (ref 3.5–5.3)
PROT SERPL-MCNC: 6.7 G/DL — SIGNIFICANT CHANGE UP (ref 6–8.3)
PROT SERPL-MCNC: 6.7 G/DL — SIGNIFICANT CHANGE UP (ref 6–8.3)
PROT SERPL-MCNC: 7 G/DL — SIGNIFICANT CHANGE UP (ref 6–8.3)
PROTHROM AB SERPL-ACNC: 18.5 SEC — HIGH (ref 10.5–13.4)
PROTHROM AB SERPL-ACNC: 18.7 SEC — HIGH (ref 10.5–13.4)
RBC # BLD: 3.72 M/UL — LOW (ref 4.2–5.8)
RBC # BLD: 4 M/UL — LOW (ref 4.2–5.8)
RBC # FLD: 13.1 % — SIGNIFICANT CHANGE UP (ref 10.3–14.5)
RBC # FLD: 13.1 % — SIGNIFICANT CHANGE UP (ref 10.3–14.5)
RH IG SCN BLD-IMP: POSITIVE — SIGNIFICANT CHANGE UP
SODIUM SERPL-SCNC: 130 MMOL/L — LOW (ref 135–145)
SODIUM SERPL-SCNC: 133 MMOL/L — LOW (ref 135–145)
SODIUM SERPL-SCNC: 133 MMOL/L — LOW (ref 135–145)
WBC # BLD: 10.52 K/UL — HIGH (ref 3.8–10.5)
WBC # BLD: 10.62 K/UL — HIGH (ref 3.8–10.5)
WBC # FLD AUTO: 10.52 K/UL — HIGH (ref 3.8–10.5)
WBC # FLD AUTO: 10.62 K/UL — HIGH (ref 3.8–10.5)

## 2023-01-16 PROCEDURE — 99231 SBSQ HOSP IP/OBS SF/LOW 25: CPT

## 2023-01-16 PROCEDURE — 99233 SBSQ HOSP IP/OBS HIGH 50: CPT

## 2023-01-16 PROCEDURE — 99292 CRITICAL CARE ADDL 30 MIN: CPT

## 2023-01-16 PROCEDURE — 71045 X-RAY EXAM CHEST 1 VIEW: CPT | Mod: 26

## 2023-01-16 PROCEDURE — 99291 CRITICAL CARE FIRST HOUR: CPT

## 2023-01-16 RX ORDER — SENNA PLUS 8.6 MG/1
2 TABLET ORAL AT BEDTIME
Refills: 0 | Status: DISCONTINUED | OUTPATIENT
Start: 2023-01-16 | End: 2023-01-18

## 2023-01-16 RX ORDER — DOBUTAMINE HCL 250MG/20ML
1.5 VIAL (ML) INTRAVENOUS
Qty: 500 | Refills: 0 | Status: DISCONTINUED | OUTPATIENT
Start: 2023-01-16 | End: 2023-01-16

## 2023-01-16 RX ORDER — FUROSEMIDE 40 MG
20 TABLET ORAL
Refills: 0 | Status: DISCONTINUED | OUTPATIENT
Start: 2023-01-16 | End: 2023-01-17

## 2023-01-16 RX ORDER — MAGNESIUM SULFATE 500 MG/ML
1 VIAL (ML) INJECTION ONCE
Refills: 0 | Status: COMPLETED | OUTPATIENT
Start: 2023-01-16 | End: 2023-01-16

## 2023-01-16 RX ORDER — HYDRALAZINE HCL 50 MG
10 TABLET ORAL EVERY 8 HOURS
Refills: 0 | Status: DISCONTINUED | OUTPATIENT
Start: 2023-01-16 | End: 2023-01-18

## 2023-01-16 RX ORDER — MAGNESIUM SULFATE 500 MG/ML
2 VIAL (ML) INJECTION ONCE
Refills: 0 | Status: COMPLETED | OUTPATIENT
Start: 2023-01-16 | End: 2023-01-16

## 2023-01-16 RX ORDER — MILRINONE LACTATE 1 MG/ML
0.12 INJECTION, SOLUTION INTRAVENOUS
Qty: 20 | Refills: 0 | Status: DISCONTINUED | OUTPATIENT
Start: 2023-01-16 | End: 2023-01-16

## 2023-01-16 RX ORDER — FUROSEMIDE 40 MG
20 TABLET ORAL EVERY 12 HOURS
Refills: 0 | Status: DISCONTINUED | OUTPATIENT
Start: 2023-01-16 | End: 2023-01-16

## 2023-01-16 RX ADMIN — LIDOCAINE 1 PATCH: 4 CREAM TOPICAL at 12:42

## 2023-01-16 RX ADMIN — Medication 650 MILLIGRAM(S): at 21:17

## 2023-01-16 RX ADMIN — Medication 650 MILLIGRAM(S): at 10:16

## 2023-01-16 RX ADMIN — CHLORHEXIDINE GLUCONATE 1 APPLICATION(S): 213 SOLUTION TOPICAL at 06:18

## 2023-01-16 RX ADMIN — PANTOPRAZOLE SODIUM 40 MILLIGRAM(S): 20 TABLET, DELAYED RELEASE ORAL at 06:14

## 2023-01-16 RX ADMIN — SPIRONOLACTONE 25 MILLIGRAM(S): 25 TABLET, FILM COATED ORAL at 06:14

## 2023-01-16 RX ADMIN — Medication 650 MILLIGRAM(S): at 22:08

## 2023-01-16 RX ADMIN — LIDOCAINE 1 PATCH: 4 CREAM TOPICAL at 19:35

## 2023-01-16 RX ADMIN — Medication 10 MILLIGRAM(S): at 21:16

## 2023-01-16 RX ADMIN — Medication 100 MILLIGRAM(S): at 12:42

## 2023-01-16 RX ADMIN — Medication 25 GRAM(S): at 04:33

## 2023-01-16 RX ADMIN — LIDOCAINE 1 PATCH: 4 CREAM TOPICAL at 05:34

## 2023-01-16 RX ADMIN — Medication 16.8 MICROGRAM(S)/KG/MIN: at 06:14

## 2023-01-16 RX ADMIN — Medication 0.6 MILLIGRAM(S): at 06:15

## 2023-01-16 RX ADMIN — Medication 20 MILLIGRAM(S): at 09:55

## 2023-01-16 RX ADMIN — Medication 100 GRAM(S): at 17:51

## 2023-01-16 RX ADMIN — Medication 20 MILLIGRAM(S): at 17:51

## 2023-01-16 RX ADMIN — Medication 650 MILLIGRAM(S): at 11:24

## 2023-01-16 RX ADMIN — MILRINONE LACTATE 12.6 MICROGRAM(S)/KG/MIN: 1 INJECTION, SOLUTION INTRAVENOUS at 06:14

## 2023-01-16 RX ADMIN — APIXABAN 5 MILLIGRAM(S): 2.5 TABLET, FILM COATED ORAL at 18:03

## 2023-01-16 RX ADMIN — Medication 81 MILLIGRAM(S): at 12:42

## 2023-01-16 RX ADMIN — APIXABAN 5 MILLIGRAM(S): 2.5 TABLET, FILM COATED ORAL at 06:15

## 2023-01-16 RX ADMIN — Medication 0.6 MILLIGRAM(S): at 18:04

## 2023-01-16 NOTE — PROGRESS NOTE ADULT - SUBJECTIVE AND OBJECTIVE BOX
INTERVAL COURSE  POD# 6  MV repair with worsening LV fx postop EF 25%  Weaning inotrops,  turned off Mil 0.125  On RA satting in high 90s, Up and walking  Left knee warmth and redness improving     VITALS  Vital Signs Last 24 Hrs  T(C): 36.6 (2023 13:41), Max: 37 (15 Stefano 2023 21:23)  T(F): 97.9 (2023 13:41), Max: 98.6 (15 Stefano 2023 21:23)  HR: 85 (2023 15:00) (80 - 103)  BP: 125/70 (15 Stefano 2023 22:00) (125/67 - 135/80)  BP(mean): 92 (15 Stefano 2023 22:00) (88 - 103)  RR: 18 (2023 15:00) (12 - 19)  SpO2: 98% (2023 15:00) (94% - 100%)    Parameters below as of 2023 15:00  Patient On (Oxygen Delivery Method): room air        I&O's Summary    15 Stefano 2023 07:01  -  2023 07:00  --------------------------------------------------------  IN: 912.8 mL / OUT: 3500 mL / NET: -2587.2 mL    2023 07:01  -  2023 16:01  --------------------------------------------------------  IN: 581 mL / OUT: 1280 mL / NET: -699 mL        CAPILLARY BLOOD GLUCOSE      POCT Blood Glucose.: 124 mg/dL (2023 11:19)  POCT Blood Glucose.: 103 mg/dL (2023 06:13)  POCT Blood Glucose.: 102 mg/dL (15 Stefano 2023 21:57)  POCT Blood Glucose.: 92 mg/dL (15 Stefano 2023 16:45)      PHYSICAL EXAM  General: A&Ox 3; NAD  Respiratory: CTA B/L; no wheezes  Cardiovascular: In sinus with 1st degree AVB  Gastrointestinal: Soft; NTND   Extremities: WWP; no edema  Neurological:  CNII-XII grossly intact; no obvious focal deficits    MEDICATIONS  (STANDING):  allopurinol 100 milliGRAM(s) Oral daily  apixaban 5 milliGRAM(s) Oral every 12 hours  aspirin enteric coated 81 milliGRAM(s) Oral daily  chlorhexidine 2% Cloths 1 Application(s) Topical daily  colchicine 0.6 milliGRAM(s) Oral two times a day  furosemide   Injectable 20 milliGRAM(s) IV Push <User Schedule>  glucagon  Injectable 1 milliGRAM(s) IntraMuscular once  hydrALAZINE 10 milliGRAM(s) Oral every 8 hours  lidocaine   4% Patch 1 Patch Transdermal daily  pantoprazole    Tablet 40 milliGRAM(s) Oral before breakfast  polyethylene glycol 3350 17 Gram(s) Oral at bedtime  senna 2 Tablet(s) Oral at bedtime  sodium chloride 0.9%. 1000 milliLiter(s) (10 mL/Hr) IV Continuous <Continuous>  spironolactone 25 milliGRAM(s) Oral daily    MEDICATIONS  (PRN):  acetaminophen     Tablet .. 650 milliGRAM(s) Oral every 6 hours PRN Mild Pain (1 - 3)  oxyCODONE    IR 5 milliGRAM(s) Oral every 4 hours PRN Moderate Pain (4 - 6)  oxyCODONE    IR 10 milliGRAM(s) Oral every 6 hours PRN Severe Pain (7 - 10)  simethicone 80 milliGRAM(s) Chew every 6 hours PRN Gas      LABS                        12.2   10.52 )-----------( 227      ( 2023 13:03 )             36.2     -    133<L>  |  96  |  24<H>  ----------------------------<  143<H>  4.5   |  24  |  0.90    Ca    8.9      2023 13:03  Phos  3.8     -  Mg     2.1     -    TPro  7.0  /  Alb  3.7  /  TBili  1.1  /  DBili  x   /  AST  36  /  ALT  32  /  AlkPhos  50  01-16    LIVER FUNCTIONS - ( 2023 13:03 )  Alb: 3.7 g/dL / Pro: 7.0 g/dL / ALK PHOS: 50 U/L / ALT: 32 U/L / AST: 36 U/L / GGT: x           PT/INR - ( 2023 13:03 )   PT: 18.7 sec;   INR: 1.56          PTT - ( 2023 13:03 )  PTT:30.2 sec      IMAGING/EKG/ETC  Reviewed.

## 2023-01-16 NOTE — PROGRESS NOTE ADULT - SUBJECTIVE AND OBJECTIVE BOX
EPS Progress Note    S: no events overnight          TELE: sinus tach with first degree AV block; this has improved compared to 01/14    PHYSICAL  Cardiac:   + s1/s2, regular.   GI:  +BS , soft  Vascular: No LE edema  Neuro: AAO x 3          MEDICATIONS  (STANDING):  aspirin enteric coated 81 milliGRAM(s) Oral daily  chlorhexidine 2% Cloths 1 Application(s) Topical daily  dexMEDEtomidine Infusion 0.2 MICROgram(s)/kG/Hr (5.6 mL/Hr) IV Continuous <Continuous>  dextrose 5%. 1000 milliLiter(s) (100 mL/Hr) IV Continuous <Continuous>  dextrose 5%. 1000 milliLiter(s) (50 mL/Hr) IV Continuous <Continuous>  dextrose 50% Injectable 25 Gram(s) IV Push once  dextrose 50% Injectable 12.5 Gram(s) IV Push once  dextrose 50% Injectable 25 Gram(s) IV Push once  DOBUTamine Infusion 5 MICROgram(s)/kG/Min (16.8 mL/Hr) IV Continuous <Continuous>  glucagon  Injectable 1 milliGRAM(s) IntraMuscular once  heparin  Infusion 500 Unit(s)/Hr (5 mL/Hr) IV Continuous <Continuous>  insulin lispro (ADMELOG) corrective regimen sliding scale   SubCutaneous Before meals and at bedtime  milrinone Infusion 0.375 MICROgram(s)/kG/Min (12.6 mL/Hr) IV Continuous <Continuous>  pantoprazole    Tablet 40 milliGRAM(s) Oral before breakfast  polyethylene glycol 3350 17 Gram(s) Oral at bedtime  sodium chloride 0.9%. 1000 milliLiter(s) (10 mL/Hr) IV Continuous <Continuous>    MEDICATIONS  (PRN):  acetaminophen     Tablet .. 650 milliGRAM(s) Oral every 6 hours PRN Mild Pain (1 - 3)  dextrose Oral Gel 15 Gram(s) Oral once PRN Blood Glucose LESS THAN 70 milliGRAM(s)/deciliter  fentaNYL    Injectable 25 MICROGram(s) IV Push every 3 hours PRN Severe Pain (7 - 10)  oxyCODONE    IR 5 milliGRAM(s) Oral every 4 hours PRN Moderate Pain (4 - 6)  oxyCODONE    IR 10 milliGRAM(s) Oral every 6 hours PRN Severe Pain (7 - 10)  simethicone 80 milliGRAM(s) Chew every 6 hours PRN Gas

## 2023-01-16 NOTE — PROGRESS NOTE ADULT - SUBJECTIVE AND OBJECTIVE BOX
CTICU  CRITICAL  CARE  attending     Hand off received 					   Pertinent clinical, laboratory, radiographic, hemodynamic, echocardiographic, respiratory data, microbiologic data and chart were reviewed and analyzed frequently throughout the course of the day  Patient seen and examined with CTS/ SH attending at bedside  Pt is a 72yr old male with PMH HTN, afib on coumadin, nephrolithiasis, hemochromatosis, nonischemic cardiomyopathy (EF 50-55, ), severe MR with prolapse of posterior mitral leaflet, admitted for surgical mgmt. Patient underwent mitral valve repair with partial ring 1/10, maze and CHERI ligation with Dr. Atkins. Arrived on dobutamine, primacor, levo, vaso. Extubated . Diuresis and weaned off inotropes. Found to have 1st degree AV block today, not on BBlocker. Off  and milrinone.         FAMILY HISTORY:  FH: arthritis (Mother)  FH: glaucoma (Sibling)  FH: hypothyroidism (Sibling)  PAST MEDICAL & SURGICAL HISTORY:  Hypertension  Renal calculi  Gou  History of hemochromatosis  Nonischemic cardiomyopathy  Mitral regurgitation  History of cholecystectomy        Patient is a 72y old  Male who presents with a chief complaint of mitral valve prolapse.      14 system review was unremarkable    Vital signs, hemodynamic and respiratory parameters were reviewed from the bedside nursing flowsheet.  ICU Vital Signs Last 24 Hrs  T(C): 36.7 (2023 17:12), Max: 37 (15 Stefano 2023 21:23)  T(F): 98.1 (2023 17:12), Max: 98.6 (15 Stefano 2023 21:23)  HR: 87 (2023 20:00) (80 - 103)  BP: 125/70 (15 Stefano 2023 22:00) (125/70 - 129/67)  BP(mean): 92 (15 Stefano 2023 22:00) (92 - 92)  ABP: 123/75 (2023 20:00) (109/63 - 141/76)  ABP(mean): 100 (2023 20:00) (75 - 100)  RR: 16 (2023 20:00) (12 - 19)  SpO2: 95% (2023 20:00) (94% - 100%)    O2 Parameters below as of 2023 21:00  Patient On (Oxygen Delivery Method): room air          Adult Advanced Hemodynamics Last 24 Hrs  CVP(mm Hg): 7 (2023 15:00) (1 - 12)  CVP(cm H2O): --  CO: --  CI: --  PA: --  PA(mean): --  PCWP: --  SVR: --  SVRI: --  PVR: --  PVRI: --, ABG - ( 2023 12:56 )  pH, Arterial: 7.48  pH, Blood: x     /  pCO2: 35    /  pO2: 87    / HCO3: 26    / Base Excess: 2.8   /  SaO2: 98.1                Intake and output was reviewed and the fluid balance was calculated  Daily     Daily   I&O's Summary    15 Stefano 2023 07:01  -  2023 07:00  --------------------------------------------------------  IN: 912.8 mL / OUT: 3500 mL / NET: -2587.2 mL    2023 07:01  -  2023 20:34  --------------------------------------------------------  IN: 1151 mL / OUT: 3030 mL / NET: -1879 mL        All lines and drain sites were assessed  Glycemic trend was reviewed      POCT Blood Glucose.: 103 mg/dL (2023 16:18)      Neuro: nad  HEENT: mmm  Heart: s1 s2  Lungs: clear bl  Abdomen: soft nt nd  Extremities: 2+ dp    Lines:  L axillary arterial line       labs  CBC Full  -  ( 2023 13:03 )  WBC Count : 10.52 K/uL  RBC Count : 4.00 M/uL  Hemoglobin : 12.2 g/dL  Hematocrit : 36.2 %  Platelet Count - Automated : 227 K/uL  Mean Cell Volume : 90.5 fl  Mean Cell Hemoglobin : 30.5 pg  Mean Cell Hemoglobin Concentration : 33.7 gm/dL  Auto Neutrophil # : x  Auto Lymphocyte # : x  Auto Monocyte # : x  Auto Eosinophil # : x  Auto Basophil # : x  Auto Neutrophil % : x  Auto Lymphocyte % : x  Auto Monocyte % : x  Auto Eosinophil % : x  Auto Basophil % : x        130<L>  |  94<L>  |  25<H>  ----------------------------<  117<H>  4.4   |  26  |  0.88    Ca    8.7      2023 16:23  Phos  3.5       Mg     1.9         TPro  6.7  /  Alb  3.3  /  TBili  1.6<H>  /  DBili  x   /  AST  31  /  ALT  30  /  AlkPhos  48      PT/INR - ( 2023 13:03 )   PT: 18.7 sec;   INR: 1.56          PTT - ( 2023 13:03 )  PTT:30.2 sec  The current medications were reviewed   MEDICATIONS  (STANDING):  allopurinol 100 milliGRAM(s) Oral daily  apixaban 5 milliGRAM(s) Oral every 12 hours  aspirin enteric coated 81 milliGRAM(s) Oral daily  chlorhexidine 2% Cloths 1 Application(s) Topical daily  colchicine 0.6 milliGRAM(s) Oral two times a day  furosemide   Injectable 20 milliGRAM(s) IV Push <User Schedule>  glucagon  Injectable 1 milliGRAM(s) IntraMuscular once  hydrALAZINE 10 milliGRAM(s) Oral every 8 hours  lidocaine   4% Patch 1 Patch Transdermal daily  pantoprazole    Tablet 40 milliGRAM(s) Oral before breakfast  polyethylene glycol 3350 17 Gram(s) Oral at bedtime  senna 2 Tablet(s) Oral at bedtime  sodium chloride 0.9%. 1000 milliLiter(s) (10 mL/Hr) IV Continuous <Continuous>  spironolactone 25 milliGRAM(s) Oral daily    MEDICATIONS  (PRN):  acetaminophen     Tablet .. 650 milliGRAM(s) Oral every 6 hours PRN Mild Pain (1 - 3)  oxyCODONE    IR 5 milliGRAM(s) Oral every 4 hours PRN Moderate Pain (4 - 6)  oxyCODONE    IR 10 milliGRAM(s) Oral every 6 hours PRN Severe Pain (7 - 10)  simethicone 80 milliGRAM(s) Chew every 6 hours PRN Gas      Assessment/Plan:  72yr old male with PMH HTN, afib on coumadin, nephrolithiasis, hemochromatosis, nonischemic cardiomyopathy (EF 50-55, ), severe MR with prolapse of posterior mitral leaflet, admitted for surgical mgmt.     POD 5 mitral valve repair with partial ring, maze and CHERI ligation ([place], 1/10)   Afib on eliquis  Aspirin  LAsix BID  1st degree AV block-EP on board  Acute postoperative anemia-trend H/H  Diet as tolerated  Replete lytes prn  GI/DVT PPX  Bowel Regimen  Pain control  OOB with PT  Close hemodynamic, ventilatory and drain monitoring and management per post op routine  Monitor for arrhythmias and monitor parameters for organ perfusion  Monitor neurologic status  Head of the bed should remain elevated to 45 deg   Chest PT and IS will be encouraged  Monitor adequacy of oxygenation and ventilation and attempt to wean oxygen  Antibiotic regimen will be tailored to the clinical, laboratory and microbiologic data  Nutritional goals will be met using po eventually, ensure adequate caloric intake and monitor the same  Stress ulcer and VTE prophylaxis will be achieved    Glycemic control is satisfactory  Electrolytes have been repleted as necessary and wound care has been carried out   Pain control has been achieved.   Aggressive physical therapy and early mobility and ambulation goals will be met   The family was updated about the course and plan.    CRITICAL CARE TIME personally provided by me  in evaluation and management, reassessments, review and interpretation of labs and x-rays, ventilator and hemodynamic management, formulating a plan and coordinating care: ___30___ MIN.  Time does not include procedural time.    CTICU ATTENDING     					  Saroj Ziegler MD

## 2023-01-16 NOTE — PROGRESS NOTE ADULT - ASSESSMENT
73 yo man with obesity and prior remote smoking, hemochromatosis, mixed/primary severe MR. Underwent MVr with cryomaze ablation on 1/10/23. Home regimen includes Coreg and Ramipril. Reported LVEF preop was 50%.     Postoperatively remains on dual inotropes and until yesterday on intermitted BiPAP. Today has improved after diuresis.     Echo postop is limited but shows LVEF~30% (global depression), relatively small LV cavity and very enlarged LA    Had Afib and CHB post-op with stable escape junctional rhythm no pacing required.    Cr normal, Hgb and Plts normal  CXR Improving congestion     On Mil 0.25 and  2.5, off vasopressors for <24h  BP ~120/60-70

## 2023-01-16 NOTE — PROGRESS NOTE ADULT - NUTRITIONAL ASSESSMENT
- Continue diuresis  - Spironolactone 25 mg daily   - Hydralazine 10 mg tid  - Hold BB for now   - Wean inotropes per CCM  - Agree with Colchicine for gout/pseudogout L knee  - Will follow with you  - Full echo this weekend     Keaton Goncalves MD.

## 2023-01-16 NOTE — PROGRESS NOTE ADULT - ASSESSMENT
73 y/o overweight M with history of HTN, paroxysmal atrial fibrillation (on coumadin), renal calculi, gout, hemochromatosis, nonischemic cardiomyopathy (EF: 50-55% on echo in 11/2022), and severe mitral regurgitation.  s/p MV repair, MAZE, AtriClip on 1/10/23.  Intraop LVEF 25-30%.  POD1 pt was in/out of AFIB.  POD 2 with AV dissociation but had a stable accelerated junctional rhythm of 80s.  Not required to use backup epicardial pacing.    Is in sinus rhythm with 1:1 conduction and a prolonged MO interval but this has continued to improve with shortening of the MO interval over time.  No acute indication for permanent pacing at this time.

## 2023-01-16 NOTE — PROGRESS NOTE ADULT - ASSESSMENT
72 M  former smoker w/PMHx of HTN,  atrial fibrillation on coumadin, gout, hemochromatosis and severe mitral regurgitation admitted  for cardiac catheterization to r/o ischemic disease prior to surgical mgmt of MR via sternotomy with CryoMaze and Mitral clip.  Nonischemic CMP EF ~ 50%    S/p Mitral valve repair/ Maze and CHERI occlusion  EF 25-30 %   1/10  Received intubated and sedated  On Levo/Vaso/  and Mil  extubated POD 1     ASSESSMENT/PLAN  In sinus with 1st degree AVB/ Post up dysrhythmia acc jun--> 1st degree with improving AL intervals  Monitor for recovery-Appreciate EP recs     and milrinone titrated off today will follow lactate and LFTs   Will start hydral 10 TID per HF recs   Continue lasix/ spironolactone   Good UOP with brisk response to lasix, ~ 1.5 L net neg on lasix BID   Good kidney function/lytes at goal will replete for K>4 and Mg>2   Non diabetic, Normoglycemic/ continue DASH diet     Ppx: Eliquis/ PPI/ HOB 45   OOB and mobilizing  CTs and mcmahon dc'd   central line dc'd   A-line to dc in am       ATTENDING: I have personally and independently provided 90  Min of critical care services. this excludes time spent on any procedures or teaching.

## 2023-01-17 LAB
ALBUMIN SERPL ELPH-MCNC: 3.5 G/DL — SIGNIFICANT CHANGE UP (ref 3.3–5)
ALBUMIN SERPL ELPH-MCNC: 3.5 G/DL — SIGNIFICANT CHANGE UP (ref 3.3–5)
ALP SERPL-CCNC: 47 U/L — SIGNIFICANT CHANGE UP (ref 40–120)
ALP SERPL-CCNC: 50 U/L — SIGNIFICANT CHANGE UP (ref 40–120)
ALT FLD-CCNC: 28 U/L — SIGNIFICANT CHANGE UP (ref 10–45)
ALT FLD-CCNC: 29 U/L — SIGNIFICANT CHANGE UP (ref 10–45)
ANION GAP SERPL CALC-SCNC: 8 MMOL/L — SIGNIFICANT CHANGE UP (ref 5–17)
ANION GAP SERPL CALC-SCNC: 9 MMOL/L — SIGNIFICANT CHANGE UP (ref 5–17)
APTT BLD: 31.5 SEC — SIGNIFICANT CHANGE UP (ref 27.5–35.5)
APTT BLD: 38.6 SEC — HIGH (ref 27.5–35.5)
AST SERPL-CCNC: 27 U/L — SIGNIFICANT CHANGE UP (ref 10–40)
AST SERPL-CCNC: 28 U/L — SIGNIFICANT CHANGE UP (ref 10–40)
BILIRUB SERPL-MCNC: 0.9 MG/DL — SIGNIFICANT CHANGE UP (ref 0.2–1.2)
BILIRUB SERPL-MCNC: 1 MG/DL — SIGNIFICANT CHANGE UP (ref 0.2–1.2)
BUN SERPL-MCNC: 24 MG/DL — HIGH (ref 7–23)
BUN SERPL-MCNC: 26 MG/DL — HIGH (ref 7–23)
CALCIUM SERPL-MCNC: 8.9 MG/DL — SIGNIFICANT CHANGE UP (ref 8.4–10.5)
CALCIUM SERPL-MCNC: 9.4 MG/DL — SIGNIFICANT CHANGE UP (ref 8.4–10.5)
CHLORIDE SERPL-SCNC: 100 MMOL/L — SIGNIFICANT CHANGE UP (ref 96–108)
CHLORIDE SERPL-SCNC: 97 MMOL/L — SIGNIFICANT CHANGE UP (ref 96–108)
CO2 SERPL-SCNC: 29 MMOL/L — SIGNIFICANT CHANGE UP (ref 22–31)
CO2 SERPL-SCNC: 29 MMOL/L — SIGNIFICANT CHANGE UP (ref 22–31)
CREAT SERPL-MCNC: 0.92 MG/DL — SIGNIFICANT CHANGE UP (ref 0.5–1.3)
CREAT SERPL-MCNC: 1.03 MG/DL — SIGNIFICANT CHANGE UP (ref 0.5–1.3)
EGFR: 77 ML/MIN/1.73M2 — SIGNIFICANT CHANGE UP
EGFR: 88 ML/MIN/1.73M2 — SIGNIFICANT CHANGE UP
GAS PNL BLDA: SIGNIFICANT CHANGE UP
GLUCOSE SERPL-MCNC: 106 MG/DL — HIGH (ref 70–99)
GLUCOSE SERPL-MCNC: 117 MG/DL — HIGH (ref 70–99)
HCT VFR BLD CALC: 34.3 % — LOW (ref 39–50)
HCT VFR BLD CALC: 34.5 % — LOW (ref 39–50)
HGB BLD-MCNC: 11.7 G/DL — LOW (ref 13–17)
HGB BLD-MCNC: 11.8 G/DL — LOW (ref 13–17)
INR BLD: 1.7 — HIGH (ref 0.88–1.16)
INR BLD: 1.83 — HIGH (ref 0.88–1.16)
LACTATE SERPL-SCNC: 0.6 MMOL/L — SIGNIFICANT CHANGE UP (ref 0.5–2)
MAGNESIUM SERPL-MCNC: 1.9 MG/DL — SIGNIFICANT CHANGE UP (ref 1.6–2.6)
MAGNESIUM SERPL-MCNC: 2 MG/DL — SIGNIFICANT CHANGE UP (ref 1.6–2.6)
MCHC RBC-ENTMCNC: 30.7 PG — SIGNIFICANT CHANGE UP (ref 27–34)
MCHC RBC-ENTMCNC: 30.8 PG — SIGNIFICANT CHANGE UP (ref 27–34)
MCHC RBC-ENTMCNC: 34.1 GM/DL — SIGNIFICANT CHANGE UP (ref 32–36)
MCHC RBC-ENTMCNC: 34.2 GM/DL — SIGNIFICANT CHANGE UP (ref 32–36)
MCV RBC AUTO: 90 FL — SIGNIFICANT CHANGE UP (ref 80–100)
MCV RBC AUTO: 90.1 FL — SIGNIFICANT CHANGE UP (ref 80–100)
NRBC # BLD: 0 /100 WBCS — SIGNIFICANT CHANGE UP (ref 0–0)
NRBC # BLD: 0 /100 WBCS — SIGNIFICANT CHANGE UP (ref 0–0)
PHOSPHATE SERPL-MCNC: 4.1 MG/DL — SIGNIFICANT CHANGE UP (ref 2.5–4.5)
PHOSPHATE SERPL-MCNC: 4.2 MG/DL — SIGNIFICANT CHANGE UP (ref 2.5–4.5)
PLATELET # BLD AUTO: 235 K/UL — SIGNIFICANT CHANGE UP (ref 150–400)
PLATELET # BLD AUTO: 244 K/UL — SIGNIFICANT CHANGE UP (ref 150–400)
POTASSIUM SERPL-MCNC: 4.2 MMOL/L — SIGNIFICANT CHANGE UP (ref 3.5–5.3)
POTASSIUM SERPL-MCNC: 4.3 MMOL/L — SIGNIFICANT CHANGE UP (ref 3.5–5.3)
POTASSIUM SERPL-SCNC: 4.2 MMOL/L — SIGNIFICANT CHANGE UP (ref 3.5–5.3)
POTASSIUM SERPL-SCNC: 4.3 MMOL/L — SIGNIFICANT CHANGE UP (ref 3.5–5.3)
PROT SERPL-MCNC: 6.8 G/DL — SIGNIFICANT CHANGE UP (ref 6–8.3)
PROT SERPL-MCNC: 6.8 G/DL — SIGNIFICANT CHANGE UP (ref 6–8.3)
PROTHROM AB SERPL-ACNC: 20.3 SEC — HIGH (ref 10.5–13.4)
PROTHROM AB SERPL-ACNC: 21.9 SEC — HIGH (ref 10.5–13.4)
RBC # BLD: 3.81 M/UL — LOW (ref 4.2–5.8)
RBC # BLD: 3.83 M/UL — LOW (ref 4.2–5.8)
RBC # FLD: 13.1 % — SIGNIFICANT CHANGE UP (ref 10.3–14.5)
RBC # FLD: 13.1 % — SIGNIFICANT CHANGE UP (ref 10.3–14.5)
SODIUM SERPL-SCNC: 135 MMOL/L — SIGNIFICANT CHANGE UP (ref 135–145)
SODIUM SERPL-SCNC: 137 MMOL/L — SIGNIFICANT CHANGE UP (ref 135–145)
WBC # BLD: 10.17 K/UL — SIGNIFICANT CHANGE UP (ref 3.8–10.5)
WBC # BLD: 9.98 K/UL — SIGNIFICANT CHANGE UP (ref 3.8–10.5)
WBC # FLD AUTO: 10.17 K/UL — SIGNIFICANT CHANGE UP (ref 3.8–10.5)
WBC # FLD AUTO: 9.98 K/UL — SIGNIFICANT CHANGE UP (ref 3.8–10.5)

## 2023-01-17 PROCEDURE — 71046 X-RAY EXAM CHEST 2 VIEWS: CPT | Mod: 26

## 2023-01-17 PROCEDURE — 99291 CRITICAL CARE FIRST HOUR: CPT

## 2023-01-17 PROCEDURE — 71045 X-RAY EXAM CHEST 1 VIEW: CPT | Mod: 26,59

## 2023-01-17 RX ORDER — AMIODARONE HYDROCHLORIDE 400 MG/1
200 TABLET ORAL DAILY
Refills: 0 | Status: DISCONTINUED | OUTPATIENT
Start: 2023-01-17 | End: 2023-01-18

## 2023-01-17 RX ORDER — METOPROLOL TARTRATE 50 MG
12.5 TABLET ORAL EVERY 12 HOURS
Refills: 0 | Status: DISCONTINUED | OUTPATIENT
Start: 2023-01-17 | End: 2023-01-17

## 2023-01-17 RX ORDER — CARVEDILOL PHOSPHATE 80 MG/1
3.12 CAPSULE, EXTENDED RELEASE ORAL EVERY 12 HOURS
Refills: 0 | Status: DISCONTINUED | OUTPATIENT
Start: 2023-01-17 | End: 2023-01-18

## 2023-01-17 RX ORDER — CALCIUM GLUCONATE 100 MG/ML
2 VIAL (ML) INTRAVENOUS ONCE
Refills: 0 | Status: COMPLETED | OUTPATIENT
Start: 2023-01-17 | End: 2023-01-17

## 2023-01-17 RX ORDER — FUROSEMIDE 40 MG
40 TABLET ORAL DAILY
Refills: 0 | Status: DISCONTINUED | OUTPATIENT
Start: 2023-01-18 | End: 2023-01-18

## 2023-01-17 RX ADMIN — LIDOCAINE 1 PATCH: 4 CREAM TOPICAL at 12:40

## 2023-01-17 RX ADMIN — Medication 0.6 MILLIGRAM(S): at 18:28

## 2023-01-17 RX ADMIN — Medication 0.6 MILLIGRAM(S): at 05:38

## 2023-01-17 RX ADMIN — SPIRONOLACTONE 25 MILLIGRAM(S): 25 TABLET, FILM COATED ORAL at 05:38

## 2023-01-17 RX ADMIN — Medication 10 MILLIGRAM(S): at 05:38

## 2023-01-17 RX ADMIN — AMIODARONE HYDROCHLORIDE 200 MILLIGRAM(S): 400 TABLET ORAL at 07:25

## 2023-01-17 RX ADMIN — LIDOCAINE 1 PATCH: 4 CREAM TOPICAL at 00:22

## 2023-01-17 RX ADMIN — Medication 200 GRAM(S): at 03:17

## 2023-01-17 RX ADMIN — LIDOCAINE 1 PATCH: 4 CREAM TOPICAL at 18:37

## 2023-01-17 RX ADMIN — Medication 81 MILLIGRAM(S): at 12:40

## 2023-01-17 RX ADMIN — CARVEDILOL PHOSPHATE 3.12 MILLIGRAM(S): 80 CAPSULE, EXTENDED RELEASE ORAL at 21:02

## 2023-01-17 RX ADMIN — CHLORHEXIDINE GLUCONATE 1 APPLICATION(S): 213 SOLUTION TOPICAL at 05:38

## 2023-01-17 RX ADMIN — Medication 100 MILLIGRAM(S): at 12:40

## 2023-01-17 RX ADMIN — Medication 10 MILLIGRAM(S): at 21:02

## 2023-01-17 RX ADMIN — Medication 20 MILLIGRAM(S): at 05:38

## 2023-01-17 RX ADMIN — Medication 12.5 MILLIGRAM(S): at 07:26

## 2023-01-17 RX ADMIN — APIXABAN 5 MILLIGRAM(S): 2.5 TABLET, FILM COATED ORAL at 05:38

## 2023-01-17 RX ADMIN — PANTOPRAZOLE SODIUM 40 MILLIGRAM(S): 20 TABLET, DELAYED RELEASE ORAL at 05:43

## 2023-01-17 RX ADMIN — Medication 10 MILLIGRAM(S): at 13:57

## 2023-01-17 RX ADMIN — APIXABAN 5 MILLIGRAM(S): 2.5 TABLET, FILM COATED ORAL at 21:02

## 2023-01-17 NOTE — PROGRESS NOTE ADULT - SUBJECTIVE AND OBJECTIVE BOX
INTERVAL COURSE  Doing well, Inotrops off since yesterday with normal lactate/LFTs   Bps in good range/ up and walking/ Left knee pain improved     VITALS  Vital Signs Last 24 Hrs  T(C): 36.2 (17 Jan 2023 05:09), Max: 36.7 (16 Jan 2023 17:12)  T(F): 97.2 (17 Jan 2023 05:09), Max: 98.1 (16 Jan 2023 17:12)  HR: 100 (17 Jan 2023 08:00) (74 - 100)  BP: 130/75 (17 Jan 2023 05:00) (130/75 - 130/75)  BP(mean): 94 (17 Jan 2023 05:00) (94 - 94)  RR: 16 (17 Jan 2023 08:00) (14 - 19)  SpO2: 95% (17 Jan 2023 08:00) (94% - 100%)  Parameters below as of 17 Jan 2023 08:00  Patient On (Oxygen Delivery Method): room air    I&Os Summary    16 Jan 2023 07:01  -  17 Jan 2023 07:00  --------------------------------------------------------  IN: 1371 mL / OUT: 5605 mL / NET: -4234 mL    PHYSICAL EXAM  General: A&Ox 3; NAD  Respiratory: CTA B/L; no wheezes  Cardiovascular: Regular rhythm/rate  Gastrointestinal: Soft; Obese and mildly distended, non tender   Extremities: Warm, no edema   Neurological:  CNII-XII grossly intact; no obvious focal deficits    MEDICATIONS  (STANDING):  allopurinol 100 milliGRAM(s) Oral daily  aMIOdarone    Tablet 200 milliGRAM(s) Oral daily  apixaban 5 milliGRAM(s) Oral every 12 hours  aspirin enteric coated 81 milliGRAM(s) Oral daily  chlorhexidine 2% Cloths 1 Application(s) Topical daily  colchicine 0.6 milliGRAM(s) Oral two times a day  glucagon  Injectable 1 milliGRAM(s) IntraMuscular once  hydrALAZINE 10 milliGRAM(s) Oral every 8 hours  lidocaine   4% Patch 1 Patch Transdermal daily  metoprolol tartrate 12.5 milliGRAM(s) Oral every 12 hours  pantoprazole    Tablet 40 milliGRAM(s) Oral before breakfast  polyethylene glycol 3350 17 Gram(s) Oral at bedtime  senna 2 Tablet(s) Oral at bedtime  sodium chloride 0.9%. 1000 milliLiter(s) (10 mL/Hr) IV Continuous <Continuous>  spironolactone 25 milliGRAM(s) Oral daily    MEDICATIONS  (PRN):  acetaminophen     Tablet .. 650 milliGRAM(s) Oral every 6 hours PRN Mild Pain (1 - 3)  oxyCODONE    IR 5 milliGRAM(s) Oral every 4 hours PRN Moderate Pain (4 - 6)  oxyCODONE    IR 10 milliGRAM(s) Oral every 6 hours PRN Severe Pain (7 - 10)  simethicone 80 milliGRAM(s) Chew every 6 hours PRN Gas      LABS                        11.8   9.98  )-----------( 244      ( 17 Jan 2023 04:05 )             34.5     01-17    137  |  100  |  24<H>  ----------------------------<  106<H>  4.3   |  29  |  0.92    Ca    9.4      17 Jan 2023 04:05  Phos  4.1     01-17  Mg     1.9     01-17    TPro  6.8  /  Alb  3.5  /  TBili  1.0  /  DBili  x   /  AST  27  /  ALT  28  /  AlkPhos  47  01-17    LIVER FUNCTIONS - ( 17 Jan 2023 04:05 )  Alb: 3.5 g/dL / Pro: 6.8 g/dL / ALK PHOS: 47 U/L / ALT: 28 U/L / AST: 27 U/L / GGT: x           PT/INR - ( 17 Jan 2023 04:05 )   PT: 20.3 sec;   INR: 1.70          PTT - ( 17 Jan 2023 04:05 )  PTT:38.6 sec      IMAGING/EKG/ETC  Reviewed.

## 2023-01-17 NOTE — PROGRESS NOTE ADULT - ASSESSMENT
72 M  former smoker w/PMHx of HTN,  atrial fibrillation on coumadin, gout, hemochromatosis and severe mitral regurgitation admitted  for cardiac catheterization to r/o ischemic disease prior to surgical mgmt of MR via sternotomy with CryoMaze and Mitral clip.  Nonischemic CMP EF ~ 50%    S/p Mitral valve repair/ Maze and CHERI occlusion  EF 25-30 %   1/10  Received intubated and sedated  On Levo/Vaso/  and Mil  extubated POD 1     ASSESSMENT/PLAN  In sinus with 1st degree AVB/ Post up dysrhythmia acc jun--> 1st degree with improving HI intervals  Appreciate EP recs    To start low dose lopressor as off inotrops   On low dose maintenance amio post maze   Continue hydral 10 TID per HF recs   Continue lasix/ spironolactone/ lasix transitioned to daily oral as pretty euvolemic on exam with improving pulm congestion   Good kidney function/lytes at goal will replete for K>4 and Mg>2   Non diabetic, Normoglycemic/ continue DASH diet     Ppx: Eliquis/ PPI/ HOB 45   OOB and mobilizing    Delined except A-line/to dc later today as received am eliquis dose     ATTENDING: I have personally and independently provided 90  Min of critical care services. this excludes time spent on any procedures or teaching.  72 M  former smoker w/PMHx of HTN,  atrial fibrillation on coumadin, gout, hemochromatosis and severe mitral regurgitation admitted  for cardiac catheterization to r/o ischemic disease prior to surgical mgmt of MR via sternotomy with CryoMaze and Mitral clip.  Nonischemic CMP EF ~ 50%    S/p Mitral valve repair/ Maze and CHERI occlusion  EF 25-30 %   1/10  Received intubated and sedated  On Levo/Vaso/  and Mil  extubated POD 1     ASSESSMENT/PLAN  In sinus with 1st degree AVB/ Post up dysrhythmia acc jun--> 1st degree with improving IN intervals  Appreciate EP recs    To start low dose lopressor as off inotrops   On low dose maintenance amio post maze   Continue hydral 10 TID per HF recs   Continue lasix/ spironolactone/ lasix transitioned to daily oral as pretty euvolemic on exam with improving pulm congestion   Good kidney function/lytes at goal will replete for K>4 and Mg>2   Non diabetic, Normoglycemic/ continue DASH diet     Ppx: Eliquis/ PPI/ HOB 45   OOB and mobilizing as tolerated     Delined except A-line/to dc later today as received am eliquis dose   Dipo: Floor eligible     ATTENDING: I have personally and independently provided 90  Min of critical care services. this excludes time spent on any procedures or teaching.  72 M  former smoker w/PMHx of HTN,  atrial fibrillation on coumadin, gout, hemochromatosis and severe mitral regurgitation admitted  for cardiac catheterization to r/o ischemic disease prior to surgical mgmt of MR via sternotomy with CryoMaze and Mitral clip.  Nonischemic CMP EF ~ 50%    S/p Mitral valve repair/ Maze and CHERI occlusion  EF 25-30 %   1/10  Received intubated and sedated  On Levo/Vaso/  and Mil  extubated POD 1     ASSESSMENT/PLAN  In sinus with 1st degree AVB/ Post up dysrhythmia acc jun--> 1st degree with improving ME intervals  Appreciate EP recs    To start low dose lopressor as off inotrops   On low dose maintenance amio post maze   Continue hydral 10 TID per HF recs   Continue lasix/ spironolactone/ lasix transitioned to daily oral --euvolemic on exam with improving pulm congestion   Good kidney function/lytes at goal will replete for K>4 and Mg>2   Non diabetic, Normoglycemic/ continue DASH diet   Ppx: Eliquis/ PPI/ HOB 45   OOB and mobilizing as tolerated   Delined except A-line/to dc later today as received am eliquis dose   Dipo: Floor eligible     ATTENDING: I have personally and independently provided 90  Min of critical care services. this excludes time spent on any procedures or teaching.

## 2023-01-17 NOTE — CHART NOTE - NSCHARTNOTEFT_GEN_A_CORE
Axillary Arterial Line Removal    Last dose of Eliquis 5:00am. Left axillary arterial line removed without incidence at 5:30pm. Manual pressure held for 15 minutes. Hemostasis achieved. Eliquis will be given later tonight.

## 2023-01-18 ENCOUNTER — TRANSCRIPTION ENCOUNTER (OUTPATIENT)
Age: 73
End: 2023-01-18

## 2023-01-18 VITALS — TEMPERATURE: 98 F

## 2023-01-18 PROBLEM — M10.9 GOUT, UNSPECIFIED: Chronic | Status: ACTIVE | Noted: 2023-01-03

## 2023-01-18 PROBLEM — I42.8 OTHER CARDIOMYOPATHIES: Chronic | Status: ACTIVE | Noted: 2023-01-03

## 2023-01-18 PROBLEM — I10 ESSENTIAL (PRIMARY) HYPERTENSION: Chronic | Status: ACTIVE | Noted: 2023-01-03

## 2023-01-18 PROBLEM — I34.0 NONRHEUMATIC MITRAL (VALVE) INSUFFICIENCY: Chronic | Status: ACTIVE | Noted: 2023-01-03

## 2023-01-18 PROBLEM — N20.0 CALCULUS OF KIDNEY: Chronic | Status: ACTIVE | Noted: 2023-01-03

## 2023-01-18 PROBLEM — Z86.39 PERSONAL HISTORY OF OTHER ENDOCRINE, NUTRITIONAL AND METABOLIC DISEASE: Chronic | Status: ACTIVE | Noted: 2023-01-03

## 2023-01-18 LAB
ANION GAP SERPL CALC-SCNC: 9 MMOL/L — SIGNIFICANT CHANGE UP (ref 5–17)
BUN SERPL-MCNC: 28 MG/DL — HIGH (ref 7–23)
CALCIUM SERPL-MCNC: 9.1 MG/DL — SIGNIFICANT CHANGE UP (ref 8.4–10.5)
CHLORIDE SERPL-SCNC: 97 MMOL/L — SIGNIFICANT CHANGE UP (ref 96–108)
CO2 SERPL-SCNC: 29 MMOL/L — SIGNIFICANT CHANGE UP (ref 22–31)
CREAT SERPL-MCNC: 0.89 MG/DL — SIGNIFICANT CHANGE UP (ref 0.5–1.3)
EGFR: 91 ML/MIN/1.73M2 — SIGNIFICANT CHANGE UP
GLUCOSE SERPL-MCNC: 107 MG/DL — HIGH (ref 70–99)
HCT VFR BLD CALC: 35.4 % — LOW (ref 39–50)
HGB BLD-MCNC: 11.9 G/DL — LOW (ref 13–17)
MAGNESIUM SERPL-MCNC: 1.8 MG/DL — SIGNIFICANT CHANGE UP (ref 1.6–2.6)
MCHC RBC-ENTMCNC: 30.8 PG — SIGNIFICANT CHANGE UP (ref 27–34)
MCHC RBC-ENTMCNC: 33.6 GM/DL — SIGNIFICANT CHANGE UP (ref 32–36)
MCV RBC AUTO: 91.7 FL — SIGNIFICANT CHANGE UP (ref 80–100)
NRBC # BLD: 0 /100 WBCS — SIGNIFICANT CHANGE UP (ref 0–0)
PLATELET # BLD AUTO: 303 K/UL — SIGNIFICANT CHANGE UP (ref 150–400)
POTASSIUM SERPL-MCNC: 4.3 MMOL/L — SIGNIFICANT CHANGE UP (ref 3.5–5.3)
POTASSIUM SERPL-SCNC: 4.3 MMOL/L — SIGNIFICANT CHANGE UP (ref 3.5–5.3)
RBC # BLD: 3.86 M/UL — LOW (ref 4.2–5.8)
RBC # FLD: 13.1 % — SIGNIFICANT CHANGE UP (ref 10.3–14.5)
SODIUM SERPL-SCNC: 135 MMOL/L — SIGNIFICANT CHANGE UP (ref 135–145)
WBC # BLD: 9.43 K/UL — SIGNIFICANT CHANGE UP (ref 3.8–10.5)
WBC # FLD AUTO: 9.43 K/UL — SIGNIFICANT CHANGE UP (ref 3.8–10.5)

## 2023-01-18 PROCEDURE — 84550 ASSAY OF BLOOD/URIC ACID: CPT

## 2023-01-18 PROCEDURE — 84100 ASSAY OF PHOSPHORUS: CPT

## 2023-01-18 PROCEDURE — U0003: CPT

## 2023-01-18 PROCEDURE — 93880 EXTRACRANIAL BILAT STUDY: CPT

## 2023-01-18 PROCEDURE — 97530 THERAPEUTIC ACTIVITIES: CPT

## 2023-01-18 PROCEDURE — 94150 VITAL CAPACITY TEST: CPT

## 2023-01-18 PROCEDURE — 82330 ASSAY OF CALCIUM: CPT

## 2023-01-18 PROCEDURE — 85730 THROMBOPLASTIN TIME PARTIAL: CPT

## 2023-01-18 PROCEDURE — C2618: CPT

## 2023-01-18 PROCEDURE — 93306 TTE W/DOPPLER COMPLETE: CPT | Mod: 26

## 2023-01-18 PROCEDURE — 88305 TISSUE EXAM BY PATHOLOGIST: CPT

## 2023-01-18 PROCEDURE — C1887: CPT

## 2023-01-18 PROCEDURE — 97162 PT EVAL MOD COMPLEX 30 MIN: CPT

## 2023-01-18 PROCEDURE — 83036 HEMOGLOBIN GLYCOSYLATED A1C: CPT

## 2023-01-18 PROCEDURE — 97116 GAIT TRAINING THERAPY: CPT

## 2023-01-18 PROCEDURE — 80061 LIPID PANEL: CPT

## 2023-01-18 PROCEDURE — 82962 GLUCOSE BLOOD TEST: CPT

## 2023-01-18 PROCEDURE — 85347 COAGULATION TIME ACTIVATED: CPT

## 2023-01-18 PROCEDURE — 86900 BLOOD TYPING SEROLOGIC ABO: CPT

## 2023-01-18 PROCEDURE — 84443 ASSAY THYROID STIM HORMONE: CPT

## 2023-01-18 PROCEDURE — 80053 COMPREHEN METABOLIC PANEL: CPT

## 2023-01-18 PROCEDURE — C1894: CPT

## 2023-01-18 PROCEDURE — P9047: CPT

## 2023-01-18 PROCEDURE — 93005 ELECTROCARDIOGRAM TRACING: CPT

## 2023-01-18 PROCEDURE — P9045: CPT

## 2023-01-18 PROCEDURE — 85027 COMPLETE CBC AUTOMATED: CPT

## 2023-01-18 PROCEDURE — 86923 COMPATIBILITY TEST ELECTRIC: CPT

## 2023-01-18 PROCEDURE — 86850 RBC ANTIBODY SCREEN: CPT

## 2023-01-18 PROCEDURE — 85610 PROTHROMBIN TIME: CPT

## 2023-01-18 PROCEDURE — 94002 VENT MGMT INPAT INIT DAY: CPT

## 2023-01-18 PROCEDURE — 83605 ASSAY OF LACTIC ACID: CPT

## 2023-01-18 PROCEDURE — 85014 HEMATOCRIT: CPT

## 2023-01-18 PROCEDURE — 86901 BLOOD TYPING SEROLOGIC RH(D): CPT

## 2023-01-18 PROCEDURE — 86803 HEPATITIS C AB TEST: CPT

## 2023-01-18 PROCEDURE — 36415 COLL VENOUS BLD VENIPUNCTURE: CPT

## 2023-01-18 PROCEDURE — C1889: CPT

## 2023-01-18 PROCEDURE — 84132 ASSAY OF SERUM POTASSIUM: CPT

## 2023-01-18 PROCEDURE — 71045 X-RAY EXAM CHEST 1 VIEW: CPT

## 2023-01-18 PROCEDURE — C8925: CPT

## 2023-01-18 PROCEDURE — 82803 BLOOD GASES ANY COMBINATION: CPT

## 2023-01-18 PROCEDURE — U0005: CPT

## 2023-01-18 PROCEDURE — 82947 ASSAY GLUCOSE BLOOD QUANT: CPT

## 2023-01-18 PROCEDURE — 99232 SBSQ HOSP IP/OBS MODERATE 35: CPT

## 2023-01-18 PROCEDURE — 83880 ASSAY OF NATRIURETIC PEPTIDE: CPT

## 2023-01-18 PROCEDURE — 82533 TOTAL CORTISOL: CPT

## 2023-01-18 PROCEDURE — C1769: CPT

## 2023-01-18 PROCEDURE — 71046 X-RAY EXAM CHEST 2 VIEWS: CPT

## 2023-01-18 PROCEDURE — 93306 TTE W/DOPPLER COMPLETE: CPT

## 2023-01-18 PROCEDURE — 81003 URINALYSIS AUTO W/O SCOPE: CPT

## 2023-01-18 PROCEDURE — 85025 COMPLETE CBC W/AUTO DIFF WBC: CPT

## 2023-01-18 PROCEDURE — 84295 ASSAY OF SERUM SODIUM: CPT

## 2023-01-18 PROCEDURE — 94660 CPAP INITIATION&MGMT: CPT

## 2023-01-18 PROCEDURE — C1751: CPT

## 2023-01-18 PROCEDURE — 80048 BASIC METABOLIC PNL TOTAL CA: CPT

## 2023-01-18 PROCEDURE — 86891 AUTOLOGOUS BLOOD OP SALVAGE: CPT

## 2023-01-18 PROCEDURE — 83735 ASSAY OF MAGNESIUM: CPT

## 2023-01-18 PROCEDURE — 82565 ASSAY OF CREATININE: CPT

## 2023-01-18 RX ORDER — POTASSIUM CHLORIDE 20 MEQ
1 PACKET (EA) ORAL
Qty: 30 | Refills: 0
Start: 2023-01-18 | End: 2023-02-16

## 2023-01-18 RX ORDER — POLYETHYLENE GLYCOL 3350 17 G/17G
17 POWDER, FOR SOLUTION ORAL
Qty: 510 | Refills: 0
Start: 2023-01-18 | End: 2023-02-16

## 2023-01-18 RX ORDER — DAPAGLIFLOZIN 10 MG/1
1 TABLET, FILM COATED ORAL
Qty: 30 | Refills: 0
Start: 2023-01-18 | End: 2023-02-16

## 2023-01-18 RX ORDER — OXYCODONE HYDROCHLORIDE 5 MG/1
1 TABLET ORAL
Qty: 12 | Refills: 0
Start: 2023-01-18 | End: 2023-01-20

## 2023-01-18 RX ORDER — FUROSEMIDE 40 MG
1 TABLET ORAL
Qty: 30 | Refills: 0
Start: 2023-01-18 | End: 2023-02-16

## 2023-01-18 RX ORDER — ASPIRIN/CALCIUM CARB/MAGNESIUM 324 MG
1 TABLET ORAL
Qty: 30 | Refills: 0
Start: 2023-01-18 | End: 2023-02-16

## 2023-01-18 RX ORDER — ACETAMINOPHEN 500 MG
2 TABLET ORAL
Qty: 240 | Refills: 0
Start: 2023-01-18 | End: 2023-02-16

## 2023-01-18 RX ORDER — CARVEDILOL PHOSPHATE 80 MG/1
1 CAPSULE, EXTENDED RELEASE ORAL
Qty: 60 | Refills: 0
Start: 2023-01-18 | End: 2023-02-16

## 2023-01-18 RX ORDER — MAGNESIUM OXIDE 400 MG ORAL TABLET 241.3 MG
800 TABLET ORAL ONCE
Refills: 0 | Status: COMPLETED | OUTPATIENT
Start: 2023-01-18 | End: 2023-01-18

## 2023-01-18 RX ORDER — DAPAGLIFLOZIN 10 MG/1
10 TABLET, FILM COATED ORAL EVERY 24 HOURS
Refills: 0 | Status: DISCONTINUED | OUTPATIENT
Start: 2023-01-18 | End: 2023-01-18

## 2023-01-18 RX ORDER — AMIODARONE HYDROCHLORIDE 400 MG/1
1 TABLET ORAL
Qty: 30 | Refills: 0
Start: 2023-01-18 | End: 2023-02-16

## 2023-01-18 RX ORDER — SACUBITRIL AND VALSARTAN 24; 26 MG/1; MG/1
1 TABLET, FILM COATED ORAL
Qty: 60 | Refills: 0
Start: 2023-01-18 | End: 2023-02-16

## 2023-01-18 RX ORDER — APIXABAN 2.5 MG/1
1 TABLET, FILM COATED ORAL
Qty: 60 | Refills: 0
Start: 2023-01-18 | End: 2023-02-16

## 2023-01-18 RX ORDER — PANTOPRAZOLE SODIUM 20 MG/1
1 TABLET, DELAYED RELEASE ORAL
Qty: 30 | Refills: 0
Start: 2023-01-18 | End: 2023-02-16

## 2023-01-18 RX ORDER — SPIRONOLACTONE 25 MG/1
1 TABLET, FILM COATED ORAL
Qty: 30 | Refills: 0
Start: 2023-01-18 | End: 2023-02-16

## 2023-01-18 RX ADMIN — AMIODARONE HYDROCHLORIDE 200 MILLIGRAM(S): 400 TABLET ORAL at 06:52

## 2023-01-18 RX ADMIN — Medication 81 MILLIGRAM(S): at 13:43

## 2023-01-18 RX ADMIN — Medication 10 MILLIGRAM(S): at 13:43

## 2023-01-18 RX ADMIN — APIXABAN 5 MILLIGRAM(S): 2.5 TABLET, FILM COATED ORAL at 06:51

## 2023-01-18 RX ADMIN — LIDOCAINE 1 PATCH: 4 CREAM TOPICAL at 13:44

## 2023-01-18 RX ADMIN — CARVEDILOL PHOSPHATE 3.12 MILLIGRAM(S): 80 CAPSULE, EXTENDED RELEASE ORAL at 06:52

## 2023-01-18 RX ADMIN — CHLORHEXIDINE GLUCONATE 1 APPLICATION(S): 213 SOLUTION TOPICAL at 06:50

## 2023-01-18 RX ADMIN — Medication 0.6 MILLIGRAM(S): at 06:51

## 2023-01-18 RX ADMIN — Medication 100 MILLIGRAM(S): at 13:43

## 2023-01-18 RX ADMIN — SPIRONOLACTONE 25 MILLIGRAM(S): 25 TABLET, FILM COATED ORAL at 06:51

## 2023-01-18 RX ADMIN — Medication 40 MILLIGRAM(S): at 06:52

## 2023-01-18 RX ADMIN — Medication 10 MILLIGRAM(S): at 06:51

## 2023-01-18 RX ADMIN — MAGNESIUM OXIDE 400 MG ORAL TABLET 800 MILLIGRAM(S): 241.3 TABLET ORAL at 07:59

## 2023-01-18 RX ADMIN — PANTOPRAZOLE SODIUM 40 MILLIGRAM(S): 20 TABLET, DELAYED RELEASE ORAL at 06:52

## 2023-01-18 NOTE — DISCHARGE NOTE PROVIDER - NSDCMRMEDTOKEN_GEN_ALL_CORE_FT
allopurinol 100 mg oral tablet: 1 tab(s) orally once a day  carvedilol 25 mg oral tablet: 1 tab(s) orally 2 times a day  furosemide 40 mg oral tablet: 1 tab(s) orally once a day  Lovenox 100 mg/mL injectable solution: 1 milliliter(s) injectable once a day  ramipril 2.5 mg oral tablet: 1 tab(s) orally once a day  sildenafil 100 mg oral tablet: 1 tab(s) orally once a day  warfarin 2.5 mg oral tablet: 1 tab(s) orally once a day   allopurinol 100 mg oral tablet: 1 tab(s) orally once a day  carvedilol 25 mg oral tablet: 1 tab(s) orally 2 times a day  furosemide 40 mg oral tablet: 1 tab(s) orally once a day  Lovenox 100 mg/mL injectable solution: 1 milliliter(s) injectable once a day  ramipril 2.5 mg oral tablet: 1 tab(s) orally once a day  Rolling Walker: 180cm  112kg  I25.10  sildenafil 100 mg oral tablet: 1 tab(s) orally once a day  warfarin 2.5 mg oral tablet: 1 tab(s) orally once a day   allopurinol 100 mg oral tablet: 1 tab(s) orally once a day  Aspirin Enteric Coated 81 mg oral delayed release tablet: 1 tab(s) orally once a day  Coreg 3.125 mg oral tablet: 1 tab(s) orally every 12 hours  Eliquis 5 mg oral tablet: 1 tab(s) orally every 12 hours  Entresto 24 mg-26 mg oral tablet: 1 tab(s) orally every 12 hours   Lasix 40 mg oral tablet: 1 tab(s) orally once a day  MiraLax oral powder for reconstitution: 17 gram(s) orally once a day (at bedtime)  oxyCODONE 5 mg oral tablet: 1 tab(s) orally every 6 hours MDD:4  Pacerone 200 mg oral tablet: 1 tab(s) orally once a day  potassium chloride 20 mEq oral tablet, extended release: 1 tab(s) orally once a day   Protonix 40 mg oral delayed release tablet: 1 tab(s) orally once a day (before a meal)  Rolling Walker: 180cm  112kg  I25.10  Tylenol 325 mg oral tablet: 2 tab(s) orally every 6 hours, As needed, Mild Pain (1 - 3)   Aldactone 25 mg oral tablet: 1 tab(s) orally once a day  allopurinol 100 mg oral tablet: 1 tab(s) orally once a day  Aspirin Enteric Coated 81 mg oral delayed release tablet: 1 tab(s) orally once a day  Coreg 3.125 mg oral tablet: 1 tab(s) orally every 12 hours  Eliquis 5 mg oral tablet: 1 tab(s) orally every 12 hours  Entresto 24 mg-26 mg oral tablet: 1 tab(s) orally every 12 hours   Lasix 40 mg oral tablet: 1 tab(s) orally once a day  MiraLax oral powder for reconstitution: 17 gram(s) orally once a day (at bedtime)  oxyCODONE 5 mg oral tablet: 1 tab(s) orally every 6 hours MDD:4  Pacerone 200 mg oral tablet: 1 tab(s) orally once a day  Protonix 40 mg oral delayed release tablet: 1 tab(s) orally once a day (before a meal)  Rolling Walker: 180cm  112kg  I25.10  Tylenol 325 mg oral tablet: 2 tab(s) orally every 6 hours, As needed, Mild Pain (1 - 3)   allopurinol 100 mg oral tablet: 1 tab(s) orally once a day  Aspirin Enteric Coated 81 mg oral delayed release tablet: 1 tab(s) orally once a day  Coreg 3.125 mg oral tablet: 1 tab(s) orally every 12 hours  Eliquis 5 mg oral tablet: 1 tab(s) orally every 12 hours  Entresto 24 mg-26 mg oral tablet: 1 tab(s) orally every 12 hours   Farxiga 10 mg oral tablet: 1 tab(s) orally every 24 hours  Lasix 40 mg oral tablet: 1 tab(s) orally once a day  MiraLax oral powder for reconstitution: 17 gram(s) orally once a day (at bedtime)  oxyCODONE 5 mg oral tablet: 1 tab(s) orally every 6 hours MDD:4  Pacerone 200 mg oral tablet: 1 tab(s) orally once a day  potassium chloride 20 mEq oral tablet, extended release: 1 tab(s) orally once a day   Protonix 40 mg oral delayed release tablet: 1 tab(s) orally once a day (before a meal)  Rolling Walker: 180cm  112kg  I25.10  Tylenol 325 mg oral tablet: 2 tab(s) orally every 6 hours, As needed, Mild Pain (1 - 3)

## 2023-01-18 NOTE — DISCHARGE NOTE PROVIDER - NSDCFUSCHEDAPPT_GEN_ALL_CORE_FT
Bryson Atkins  [facility]  CTSURG [address]  Scheduled Appointment: 01/23/2023    Keaton Novoa  [facility]  HEARTVASC [address]  Scheduled Appointment: 01/30/2023    Ross Cedlilo  [facility]  HEARTVASC [address]  Scheduled Appointment: 02/02/2023

## 2023-01-18 NOTE — DISCHARGE NOTE PROVIDER - NSDCFUADDINST_GEN_ALL_CORE_FT
-Walk daily as tolerated and use your incentive spirometer 10 times every hour while you are awake.     -Please weigh yourself daily. If you notice over a 3 pound weight gain in 3 days, this is a sign you are likely retaining too much fluid. It is imperative you call our right away with unexplained rapid weight gain.      -Please continue to wear the compression stockings given to you in the hospital at home. This is a way to prevent fluid from building up in your legs.     -No driving or strenuous activity/exercise until cleared by your surgeon.    -Gently clean your incisions with unscented/antibacterial soap and water, pat dry.  You may leave them open to air.    -Call your doctor if you have shortness of breath, chest pain not relieved by pain medication, dizziness, fever >100.5, or increased redness or drainage from incisions.   -Walk daily as tolerated and use your incentive spirometer 10 times every hour while you are awake.     -Please weigh yourself daily. If you notice over a 3 pound weight gain in 3 days, this is a sign you are likely retaining too much fluid. It is imperative you call our right away with unexplained rapid weight gain.      -Please continue to wear the compression stockings given to you in the hospital at home. This is a way to prevent fluid from building up in your legs.     -No driving or strenuous activity/exercise until cleared by your surgeon.    -You may remove your dressing in 48 hours. Gently clean your incisions with unscented/antibacterial soap and water, pat dry.  You may leave them open to air.    -Call your doctor if you have shortness of breath, chest pain not relieved by pain medication, dizziness, fever >100.5, or increased redness or drainage from incisions.

## 2023-01-18 NOTE — PROGRESS NOTE ADULT - ATTENDING COMMENTS
72 year old man post op mitral valve surgery, history of atrial fibrillation. Postoperatively had accelerated junctional rhythm. Currently this appears resolved, with sinus as his rhythm and first degree av block. Agree with medications to prevent atrial fibrillation, and will continue to follow.
73 yo man with obesity and prior remote smoking, hemochromatosis, mixed/primary severe MR. Underwent MVr with cryomaze ablation on 1/10/23. Home regimen includes Coreg and Ramipril. Reported LVEF preop was 50%.     Postoperatively remains on dual inotropes and until yesterday on intermitted BiPAP. Today has improved after diuresis.     Echo postop is limited but shows LVEF~30% (global depression), relatively small LV cavity and very enlarged LA    Had Afib and CHB post-op with stable escape junctional rhythm no pacing required.    Cr normal, Hgb and Plts normal  CXR Improving congestion     On Mil 0.25 and  2.5, off vasopressors for <24h  BP ~120/60-70    - Continue diuresis  - Spironolactone 25 mg daily   - Defer vasodilators for now  - Hold BB for now   - Wean inotropes as tolerated - tolerating  1.5  - Agree with Colchicine for gout/pseudogout L knee  - Will follow with you  - Full echo this weekend     Keaton Goncalves MD
71 yo man with obesity and prior remote smoking, hemochromatosis, mixed/primary severe MR. Underwent MVr with cryomaze ablation on 1/10/23. Home regimen includes Coreg and Ramipril. Reported LVEF preop was 50%.     Postoperatively remains on dual inotropes and until yesterday on intermitted BiPAP. Today has improved after diuresis.     Echo postop is limited but shows LVEF~30% (global depression), relatively small LV cavity and very enlarged LA    Had Afib and CHB post-op with stable escape junctional rhythm no pacing required.    Cr normal, Hgb-11.3, Plts-129  CXR Improving congestion     On Mil 0.25 and  2.5, off vasopressors for <24h  BP ~100-110/60-70    - Continue diuresis per CTICU team  - Spironolactone 25 mg daily   - Defer vasodilators for now  - Hold BB for now   - Wean inotropes as tolerated   - Will follow with you  - Full echo this weekend when on single inotrope    Keaton Goncalves MD
73 yo man with obesity and prior remote smoking, hemochromatosis, mixed/primary severe MR. Underwent MVr with cryomaze ablation on 1/10/23. Home regimen includes Coreg and Ramipril. Reported LVEF preop was 50%.     Postoperatively remains on dual inotropes and until yesterday on intermitted BiPAP. Today has improved after diuresis.     Echo postop is limited but shows LVEF~30% (global depression), relatively small LV cavity and very enlarged LA    Had Afib and CHB post-op with stable escape junctional rhythm no pacing required.    Cr normal, Hgb and Plts normal  CXR Improving congestion     On Mil 0.25 and  2.5, off vasopressors for <24h  BP ~120/60-70    - Continue diuresis  - Spironolactone 25 mg daily   - Defer vasodilators until tomorrow  - Hold BB for now   - Wean inotropes as tolerated   - Will follow with you  - Full echo this weekend     Keaton Goncalves MD
agree with plan as above.

## 2023-01-18 NOTE — DISCHARGE NOTE PROVIDER - PROVIDER TOKENS
PROVIDER:[TOKEN:[[id number]]],PROVIDER:[TOKEN:[[id number]]],PROVIDER:[TOKEN:[[id number]]],PROVIDER:[TOKEN:[[id number]]] PROVIDER:[TOKEN:[[id number]],SCHEDULEDAPPT:[01/23/2023],SCHEDULEDAPPTTIME:[01:00 PM]],PROVIDER:[TOKEN:[[id number]],SCHEDULEDAPPT:[02/02/2023],SCHEDULEDAPPTTIME:[09:40 AM]],PROVIDER:[TOKEN:[[id number]],SCHEDULEDAPPT:[02/01/2023],SCHEDULEDAPPTTIME:[12:10 PM]],PROVIDER:[TOKEN:[[id number]],SCHEDULEDAPPT:[01/30/2023],SCHEDULEDAPPTTIME:[12:00 PM]]

## 2023-01-18 NOTE — DISCHARGE NOTE NURSING/CASE MANAGEMENT/SOCIAL WORK - PATIENT PORTAL LINK FT
You can access the FollowMyHealth Patient Portal offered by [facility] by registering at the following website: [email or web address]. By joining [facility]’s FollowMyHealth portal, you will also be able to view your health information using other applications (apps) compatible with our system.

## 2023-01-18 NOTE — DISCHARGE NOTE PROVIDER - CARE PROVIDERS DIRECT ADDRESSES
[email or web address]

## 2023-01-18 NOTE — PROGRESS NOTE ADULT - PROVIDER SPECIALTY LIST ADULT
Critical Care
Heart Failure
Critical Care
Electrophysiology
Heart Failure
Critical Care
Electrophysiology
Heart Failure

## 2023-01-18 NOTE — DISCHARGE NOTE PROVIDER - CARE PROVIDER_API CALL
Bryson Atkins)  Cardiovascular Surgery  [address], [place] [address]  Phone: [phone]  Fax: [phone]  Follow Up Time:     Ross Cedillo)  Cardiac Electrophysiology; Cardiovascular Disease; Internal Medicine  [address] [address]  Phone: [phone]  Fax: [phone]  Follow Up Time:     Napoleon Corbett)  Internal Medicine  [address] [place] [address]  Phone: [phone]  Fax: [phone]  Follow Up Time:     TRINH VARNER  Cardiology  Phone: [phone]  Fax: [phone])-  Follow Up Time:    Bryson Atkins)  Cardiovascular Surgery  [address], [place] [address]  Phone: [phone]  Fax: [phone]  Scheduled Appointment: 01/23/2023 01:00 PM    Ross Cedillo)  Cardiac Electrophysiology; Cardiovascular Disease; Internal Medicine  [address] [address]  Phone: [phone]  Fax: [phone]  Scheduled Appointment: 02/02/2023 09:40 AM    Napoleon Corbett)  Internal Medicine  [address] [place] [address]  Phone: [phone]  Fax: [phone]  Scheduled Appointment: 02/01/2023 12:10 PM    TRINH VARNER  Cardiology  Phone: [phone]  Fax: [phone]  Scheduled Appointment: 01/30/2023 12:00 PM

## 2023-01-18 NOTE — DISCHARGE NOTE NURSING/CASE MANAGEMENT/SOCIAL WORK - NSDCPEFALRISK_GEN_ALL_CORE
For information on Fall & Injury Prevention, visit: [email or web address] OR  [email or web address] OR  https://www.cdc.gov/steadi/patient.html

## 2023-01-18 NOTE — PROGRESS NOTE ADULT - ASSESSMENT
72M Former smoker, Hx of hemochromatosis, HTN, AFib (on coumadin), NICM with severe MR presented for elective mitral valve repair of primary severe MR with prolapse of posterior leaflet. Advanced HF consulted for further post op management after CARLY revealed low EF.     Cardiac Studies:  LHC (1/9/23): No significant coronary disease  CARLY (11/14/22): LVEF 50-55%, severely dilated LA, severe MR prolapse of posterior leaflet w/ PV flow systolic flow reversal, mitral regurgitation jet is directed toward septum, no LA/CHERI thrombus  TTE today: Severely reduced LV fx 25-30%, reduced RV function, biatrial enlargement, mitral anuloplasty ring with 5mmHg gradient, mod AR, dilated aortic root     HFrEF:   Etiology: NICM, LVEF borderline 50-55% preop ---> 25-30% post op most likely due to change in loading conditions   GDMT: c/w coreg 3.125mg BID. Start Entresto 24/26mg BID and Farxiga 10mg qd. Will start spironolactone as OP. Please discontinue hydralazine   Diuretics: lasix PO 40mg   Device: Optimize GDMT for 3 months     Patient to follow up in HF clinic within 2 weeks with Dr Blanchard

## 2023-01-18 NOTE — DISCHARGE NOTE PROVIDER - NSDCCPCAREPLAN_GEN_ALL_CORE_FT
PRINCIPAL DISCHARGE DIAGNOSIS  Diagnosis: Mitral valve prolapse  Assessment and Plan of Treatment:

## 2023-01-18 NOTE — DISCHARGE NOTE PROVIDER - HOSPITAL COURSE
Patient discussed on morning rounds with Dr. Atkins    Operation Date: 1/10/23    Primary Surgeon/Attending MD: Dr. Atkins     Referring Physician: Dr. Napoleon Corbett  _ _ _ _ _ _ _ _ _ _ _ _  HOSPITAL COURSE:  73 YO Male, former smoker (1 ppd, quit 43 years ago) with PMHx of HTN, renal calculi, gout, hemochromatosis (managed by Dr.Jeffrey Gomez), A-Fib (on Coumadin, last dose 1/3/23), nonischemic cardiomyopathy and severe mitral regurgitation who was referred to Dr. Atkins by Dr. Napoleon Corbett for evaluation and management of his valvular disease. Preop cardiac cath on 22 revealed coronaries with mild LI only and known severe MR. Admitted to CTS under Dr. Atkins for presurgical testing for planned procedure. On 1/10/23 patient underwent MVR, CHERI occlusion, cryomaze ablation with Dr. Atkins and Dr. Larkin. Patient arrived to CTICU on , primacor, levo and vaso. Overnight given volume, levo and vaso weaned off, given amio for ectopy and found to have accelerated junctional rhythm which responded to carotid massage. POD1 volume given, extubated to HFNC. POD2 BIPAP & continued HFNC, started on diuretics and primacor weaned down. POD3-4 continued ionotropes and diuresis. POD5 weaned . POD6 primacor and  off. POD7 BB and amiodarone started, transferred to [facility]. POD8 Patient had TTE which revealed _______. Final HF recs obtained, epicardial wires removed and patient cleared for discharge home with Dr. Atkins.   _ _ _ _ _ _ _ _ _ _ _ _  DISCHARGE PHYSICAL EXAM:  GENERAL: NAD, lying in bed comfortably  HEAD:  Atraumatic, Normocephalic  EYES: EOMI, PERRLA, conjunctiva and sclera clear  ENT: Moist mucous membranes  NECK: Supple, No JVD  CHEST/LUNG: Clear to auscultation bilaterally; No rales, rhonchi, wheezing, or rubs. Unlabored respirations  HEART: Regular rate and rhythm; No murmurs, rubs, or gallops  ABDOMEN: Bowel sounds present; Soft, Nontender, Nondistended. No hepatomegally  EXTREMITIES:  2+ Peripheral Pulses, brisk capillary refill. No clubbing, cyanosis, or edema  NERVOUS SYSTEM:  Alert & Oriented X3, speech clear. No deficits   _ _ _ _ _ _ _ _ _ _ _ _  REMOVAL CHECKLIST:        [X ] Epicardial wires- cut, patient on Eliquis          [ ] Stitches/tie downs,   If no, why? ______  _ _ _ _ _ _ _ _ _ _ _ _   MEDICATION DISCHARGE CHECKLIST     Surgical Valve        [X ] Aspirin, [  ] Contraindicated, Reason_______________________________        [X ] Lasix, [  ] Contraindicated, Reason_______________________________             Duration: _____        [X ] Beta-Blocker, [  ] Contraindicated, Reason_______________________________        Anticoagulation        [X ] NOAC, [ ] Reason _______________________________              Cost/Insurance barriers addressed: YES/NO         [ ] Coumadin, [ ] Reason _______________________________              Dose:___________              INR Goal: ___________              Follow up established: YES/NO  _ _ _ _ _ _ _ _ _ _ _ _  RELEVANT LABS/IMAGING:    _ _ _ _ _ _ _ _ _ _ _ _  Over 35 minutes was spent with the patient reviewing the discharge material including medications, follow up appointments, recovery, concerning symptoms, and how to contact their health care providers if they have questions   Patient discussed on morning rounds with Dr. Atkins    Operation Date: 1/10/23    Primary Surgeon/Attending MD: Dr. Atkins     Referring Physician: Dr. Napoleon Corbett  _ _ _ _ _ _ _ _ _ _ _ _  HOSPITAL COURSE:  71 YO Male, former smoker (1 ppd, quit 43 years ago) with PMHx of HTN, renal calculi, gout, hemochromatosis (managed by Dr.Jeffrey Gomez), A-Fib (on Coumadin, last dose 1/3/23), nonischemic cardiomyopathy and severe mitral regurgitation who was referred to Dr. Atkins by Dr. Napoleon Corbett for evaluation and management of his valvular disease. Preop cardiac cath on 22 revealed coronaries with mild LI only and known severe MR. Admitted to CTS under Dr. Atkins for presurgical testing for planned procedure. On 1/10/23 patient underwent MVR, CHERI occlusion, cryomaze ablation with Dr. Atkins and Dr. Larkin. Patient arrived to CTICU on , primacor, levo and vaso. Overnight given volume, levo and vaso weaned off, given amio for ectopy and found to have accelerated junctional rhythm which responded to carotid massage. POD1 volume given, extubated to HFNC. POD2 BIPAP & continued HFNC, started on diuretics and primacor weaned down. POD3-4 continued ionotropes and diuresis. POD5 weaned . POD6 primacor and  off. POD7 BB and amiodarone started, transferred to [facility]. POD8 Patient had TTE which revealed EF of 25-30% and trace pericardial effusion. Final HF recs obtained, epicardial wires removed and patient cleared for discharge home with Dr. Atkins.   _ _ _ _ _ _ _ _ _ _ _ _  DISCHARGE PHYSICAL EXAM:  GENERAL: NAD, lying in bed comfortably  HEAD:  Atraumatic, Normocephalic  EYES: EOMI, PERRLA, conjunctiva and sclera clear  ENT: Moist mucous membranes  NECK: Supple, No JVD  CHEST/LUNG: Clear to auscultation bilaterally; No rales, rhonchi, wheezing, or rubs. Unlabored respirations  HEART: Regular rate and rhythm; No murmurs, rubs, or gallops  ABDOMEN: Bowel sounds present; Soft, Nontender, Nondistended. No hepatomegally  EXTREMITIES:  2+ Peripheral Pulses, brisk capillary refill. No clubbing, cyanosis, or edema  NERVOUS SYSTEM:  Alert & Oriented X3, speech clear. No deficits   _ _ _ _ _ _ _ _ _ _ _ _  REMOVAL CHECKLIST:        [X ] Epicardial wires- cut, patient on Eliquis          [ ] Stitches/tie downs,   If no, why? ______  _ _ _ _ _ _ _ _ _ _ _ _   MEDICATION DISCHARGE CHECKLIST     Surgical Valve        [X ] Aspirin, [  ] Contraindicated, Reason_______________________________        [X ] Lasix, [  ] Contraindicated, Reason_______________________________             Duration: _____        [X ] Beta-Blocker, [  ] Contraindicated, Reason_______________________________        Anticoagulation        [X ] NOAC, [ ] Reason _______________________________              Cost/Insurance barriers addressed: YES/NO         [ ] Coumadin, [ ] Reason _______________________________              Dose:___________              INR Goal: ___________              Follow up established: YES/NO  _ _ _ _ _ _ _ _ _ _ _ _  RELEVANT LABS/IMAGING:  < from: [facility] Echo Doppler w/ Cont (23 @ 10:36) >    CONCLUSIONS:     1. Severely reduced left ventricular systolic function, ejection   fraction 25-30% .   2. Normal right ventricular size.   3. Reduced right ventricular systolic function.   4. Biatrial enlargement.   5. An annuloplasty ring is noted in the mitral position. The mean   transvalvular gradient is 5.00 mmHg at a heart rate of 70 bpm. There is   trace mitral regurgitation.   6. Moderate aortic regurgitation. The jet is eccentric and posteriorly   directed.   7. Pulmonary artery systolic pressure is 34 mmHg.   8. Trivial pericardial effusion without echocardiographic evidence of   cardiac tamponade physiology.   9. 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  10. The proximal ascending aorta is dilated measuring 4.40 cm.  11. No prior echo is available for comparison  _ _ _ _ _ _ _ _ _ _ _ _  Over 35 minutes was spent with the patient reviewing the discharge material including medications, follow up appointments, recovery, concerning symptoms, and how to contact their health care providers if they have questions   Patient discussed on morning rounds with Dr. Atkins    Operation Date: 1/10/23    Primary Surgeon/Attending MD: Dr. Atkins     Referring Physician: Dr. Napoleon Corbett  _ _ _ _ _ _ _ _ _ _ _ _  HOSPITAL COURSE:  71 YO Male, former smoker (1 ppd, quit 43 years ago) with PMHx of HTN, renal calculi, gout, hemochromatosis (managed by Dr.Jeffrey Gomez), A-Fib (on Coumadin, last dose 1/3/23), nonischemic cardiomyopathy and severe mitral regurgitation who was referred to Dr. Atkins by Dr. Napoleon Corbett for evaluation and management of his valvular disease. Preop cardiac cath on 22 revealed coronaries with mild LI only and known severe MR. Admitted to CTS under Dr. Atkins for presurgical testing for planned procedure. On 1/10/23 patient underwent MVR, CHERI occlusion, cryomaze ablation with Dr. Atkins and Dr. Larkin. Patient arrived to CTICU on , primacor, levo and vaso. Overnight given volume, levo and vaso weaned off, given amio for ectopy and found to have accelerated junctional rhythm which responded to carotid massage. POD1 volume given, extubated to HFNC. POD2 BIPAP & continued HFNC, started on diuretics and primacor weaned down. POD3-4 continued ionotropes and diuresis. POD5 weaned . POD6 primacor and  off. POD7 BB and amiodarone started, transferred to [facility]. POD8 Patient had TTE which revealed EF of 25-30% and trace pericardial effusion. Final HF recs obtained (start Entresto and discontinue Hydralazine), epicardial wires removed and patient cleared for discharge home with Dr. Atkins.   _ _ _ _ _ _ _ _ _ _ _ _  DISCHARGE PHYSICAL EXAM:  GENERAL: NAD, lying in bed comfortably  HEAD:  Atraumatic, Normocephalic  EYES: EOMI, PERRLA, conjunctiva and sclera clear  ENT: Moist mucous membranes  NECK: Supple, No JVD  CHEST/LUNG: Clear to auscultation bilaterally; No rales, rhonchi, wheezing, or rubs. Unlabored respirations  HEART: Regular rate and rhythm; No murmurs, rubs, or gallops  ABDOMEN: Bowel sounds present; Soft, Nontender, Nondistended. No hepatomegally  EXTREMITIES:  2+ Peripheral Pulses, brisk capillary refill. No clubbing, cyanosis, or edema  NERVOUS SYSTEM:  Alert & Oriented X3, speech clear. No deficits   _ _ _ _ _ _ _ _ _ _ _ _  REMOVAL CHECKLIST:        [X ] Epicardial wires- cut, patient on Eliquis          [ ] Stitches/tie downs,   If no, why? ______  _ _ _ _ _ _ _ _ _ _ _ _   MEDICATION DISCHARGE CHECKLIST     Surgical Valve        [X ] Aspirin, [  ] Contraindicated, Reason_______________________________        [X ] Lasix, [  ] Contraindicated, Reason_______________________________             Duration: _____        [X ] Beta-Blocker, [  ] Contraindicated, Reason_______________________________        Anticoagulation        [X ] NOAC, [ ] Reason _______________________________              Cost/Insurance barriers addressed: YES/NO         [ ] Coumadin, [ ] Reason _______________________________              Dose:___________              INR Goal: ___________              Follow up established: YES/NO  _ _ _ _ _ _ _ _ _ _ _ _  RELEVANT LABS/IMAGING:  < from: [facility] Echo Doppler w/ Cont (23 @ 10:36) >    CONCLUSIONS:     1. Severely reduced left ventricular systolic function, ejection   fraction 25-30% .   2. Normal right ventricular size.   3. Reduced right ventricular systolic function.   4. Biatrial enlargement.   5. An annuloplasty ring is noted in the mitral position. The mean   transvalvular gradient is 5.00 mmHg at a heart rate of 70 bpm. There is   trace mitral regurgitation.   6. Moderate aortic regurgitation. The jet is eccentric and posteriorly   directed.   7. Pulmonary artery systolic pressure is 34 mmHg.   8. Trivial pericardial effusion without echocardiographic evidence of   cardiac tamponade physiology.   9. 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  10. The proximal ascending aorta is dilated measuring 4.40 cm.  11. No prior echo is available for comparison  _ _ _ _ _ _ _ _ _ _ _ _  Over 35 minutes was spent with the patient reviewing the discharge material including medications, follow up appointments, recovery, concerning symptoms, and how to contact their health care providers if they have questions   Patient discussed on morning rounds with Dr. Atkins    Operation Date: 1/10/23    Primary Surgeon/Attending MD: Dr. Atkins     Referring Physician: Dr. Napoleon Corbett  _ _ _ _ _ _ _ _ _ _ _ _  HOSPITAL COURSE:  73 YO Male, former smoker (1 ppd, quit 43 years ago) with PMHx of HTN, renal calculi, gout, hemochromatosis (managed by Dr.Jeffrey Gomez), A-Fib (on Coumadin, last dose 1/3/23), nonischemic cardiomyopathy and severe mitral regurgitation who was referred to Dr. Atkins by Dr. Napoleon Corbett for evaluation and management of his valvular disease. Preop cardiac cath on 22 revealed coronaries with mild LI only and known severe MR. Admitted to CTS under Dr. Atkins for presurgical testing for planned procedure. On 1/10/23 patient underwent MVR, CHERI occlusion, cryomaze ablation with Dr. Atkins and Dr. Larkin. Patient arrived to CTICU on , primacor, levo and vaso. Overnight given volume, levo and vaso weaned off, given amio for ectopy and found to have accelerated junctional rhythm which responded to carotid massage. POD1 volume given, extubated to HFNC. POD2 BIPAP & continued HFNC, started on diuretics and primacor weaned down. POD3-4 continued ionotropes and diuresis. POD5 weaned . POD6 primacor and  off. POD7 BB and amiodarone started, transferred to [facility]. POD8 Patient had TTE which revealed EF of 25-30% and trace pericardial effusion. Final HF recs obtained (start Entresto and Farxiga, and discontinue Hydralazine), epicardial wires removed and patient cleared for discharge home with Dr. Atkins.   _ _ _ _ _ _ _ _ _ _ _ _  DISCHARGE PHYSICAL EXAM:  GENERAL: NAD, lying in bed comfortably  HEAD:  Atraumatic, Normocephalic  EYES: EOMI, PERRLA, conjunctiva and sclera clear  ENT: Moist mucous membranes  NECK: Supple, No JVD  CHEST/LUNG: Clear to auscultation bilaterally; No rales, rhonchi, wheezing, or rubs. Unlabored respirations  HEART: Regular rate and rhythm; No murmurs, rubs, or gallops  ABDOMEN: Bowel sounds present; Soft, Nontender, Nondistended. No hepatomegally  EXTREMITIES:  2+ Peripheral Pulses, brisk capillary refill. No clubbing, cyanosis, or edema  NERVOUS SYSTEM:  Alert & Oriented X3, speech clear. No deficits   _ _ _ _ _ _ _ _ _ _ _ _  REMOVAL CHECKLIST:        [X ] Epicardial wires- cut, patient on Eliquis          [ ] Stitches/tie downs,   If no, why? ______  _ _ _ _ _ _ _ _ _ _ _ _   MEDICATION DISCHARGE CHECKLIST     Surgical Valve        [X ] Aspirin, [  ] Contraindicated, Reason_______________________________        [X ] Lasix, [  ] Contraindicated, Reason_______________________________             Duration: _____        [X ] Beta-Blocker, [  ] Contraindicated, Reason_______________________________        Anticoagulation        [X ] NOAC, [ ] Reason _______________________________              Cost/Insurance barriers addressed: YES/NO         [ ] Coumadin, [ ] Reason _______________________________              Dose:___________              INR Goal: ___________              Follow up established: YES/NO  _ _ _ _ _ _ _ _ _ _ _ _  RELEVANT LABS/IMAGING:  < from: [facility] Echo Doppler w/ Cont (23 @ 10:36) >    CONCLUSIONS:     1. Severely reduced left ventricular systolic function, ejection   fraction 25-30% .   2. Normal right ventricular size.   3. Reduced right ventricular systolic function.   4. Biatrial enlargement.   5. An annuloplasty ring is noted in the mitral position. The mean   transvalvular gradient is 5.00 mmHg at a heart rate of 70 bpm. There is   trace mitral regurgitation.   6. Moderate aortic regurgitation. The jet is eccentric and posteriorly   directed.   7. Pulmonary artery systolic pressure is 34 mmHg.   8. Trivial pericardial effusion without echocardiographic evidence of   cardiac tamponade physiology.   9. 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  10. The proximal ascending aorta is dilated measuring 4.40 cm.  11. No prior echo is available for comparison  _ _ _ _ _ _ _ _ _ _ _ _  Over 35 minutes was spent with the patient reviewing the discharge material including medications, follow up appointments, recovery, concerning symptoms, and how to contact their health care providers if they have questions

## 2023-01-18 NOTE — PROGRESS NOTE ADULT - SUBJECTIVE AND OBJECTIVE BOX
INTERVAL EVENTS: Doing well today. Got repeat TTE at bedside. Otherwise asymptomatic. No chest pain/ shortness of breath.     PAST MEDICAL & SURGICAL HISTORY:  Hypertension    Renal calculi    Gout    History of hemochromatosis    Nonischemic cardiomyopathy    Mitral regurgitation    History of cholecystectomy        MEDICATIONS  (STANDING):  allopurinol 100 milliGRAM(s) Oral daily  aMIOdarone    Tablet 200 milliGRAM(s) Oral daily  apixaban 5 milliGRAM(s) Oral every 12 hours  aspirin enteric coated 81 milliGRAM(s) Oral daily  carvedilol 3.125 milliGRAM(s) Oral every 12 hours  chlorhexidine 2% Cloths 1 Application(s) Topical daily  colchicine 0.6 milliGRAM(s) Oral two times a day  dapagliflozin 10 milliGRAM(s) Oral every 24 hours  furosemide    Tablet 40 milliGRAM(s) Oral daily  glucagon  Injectable 1 milliGRAM(s) IntraMuscular once  hydrALAZINE 10 milliGRAM(s) Oral every 8 hours  lidocaine   4% Patch 1 Patch Transdermal daily  pantoprazole    Tablet 40 milliGRAM(s) Oral before breakfast  polyethylene glycol 3350 17 Gram(s) Oral at bedtime  senna 2 Tablet(s) Oral at bedtime  sodium chloride 0.9%. 1000 milliLiter(s) (10 mL/Hr) IV Continuous <Continuous>  spironolactone 25 milliGRAM(s) Oral daily    MEDICATIONS  (PRN):  acetaminophen     Tablet .. 650 milliGRAM(s) Oral every 6 hours PRN Mild Pain (1 - 3)  oxyCODONE    IR 5 milliGRAM(s) Oral every 4 hours PRN Moderate Pain (4 - 6)  oxyCODONE    IR 10 milliGRAM(s) Oral every 6 hours PRN Severe Pain (7 - 10)  simethicone 80 milliGRAM(s) Chew every 6 hours PRN Gas    Vital Signs Last 24 Hrs  T(C): 36.9 (18 Jan 2023 14:26), Max: 36.9 (18 Jan 2023 14:26)  T(F): 98.4 (18 Jan 2023 14:26), Max: 98.4 (18 Jan 2023 14:26)  HR: 80 (18 Jan 2023 14:00) (70 - 89)  BP: 135/72 (18 Jan 2023 14:00) (99/60 - 157/71)  BP(mean): 92 (18 Jan 2023 14:00) (73 - 113)  RR: 18 (18 Jan 2023 13:45) (16 - 18)  SpO2: 99% (18 Jan 2023 14:00) (93% - 100%)    Parameters below as of 18 Jan 2023 12:18  Patient On (Oxygen Delivery Method): room air        PHYSICAL EXAM:  GEN: Awake, alert. NAD.   HEENT: NCAT, PERRL, EOMI. Mucosa moist. No JVD.  RESP: CTA b/l  CV: RRR. Normal S1/S2. No m/r/g.  ABD: Soft. NT/ND. BS+  EXT: Warm. No edema, clubbing, or cyanosis.   NEURO: AAOx3. No focal deficits.     LABS:                        11.9   9.43  )-----------( 303      ( 18 Jan 2023 05:30 )             35.4     01-18    135  |  97  |  28<H>  ----------------------------<  107<H>  4.3   |  29  |  0.89    Ca    9.1      18 Jan 2023 05:30  Phos  4.1     01-17  Mg     1.8     01-18    TPro  6.8  /  Alb  3.5  /  TBili  1.0  /  DBili  x   /  AST  27  /  ALT  28  /  AlkPhos  47  01-17        PT/INR - ( 17 Jan 2023 04:05 )   PT: 20.3 sec;   INR: 1.70          PTT - ( 17 Jan 2023 04:05 )  PTT:38.6 sec    I&O's Summary    17 Jan 2023 07:01  -  18 Jan 2023 07:00  --------------------------------------------------------  IN: 1010 mL / OUT: 1570 mL / NET: -560 mL            ONCLUSIONS:     1. Severely reduced left ventricular systolic function, ejection   fraction    25-30% .   2. Normal right ventricular size.   3. Reduced right ventricular systolic function.   4. Biatrial enlargement.   5. An annuloplasty ring is noted in the mitral position. The mean   transvalvular gradient is 5.00 mmHg at a heart rate of 70 bpm. There is   trace mitral regurgitation.   6. Moderate aortic regurgitation. The jet is eccentric and posteriorly   directed.   7. Pulmonary artery systolic pressure is 34 mmHg.   8. Trivial pericardial effusion without echocardiographic evidence of   cardiac tamponade physiology.   9. 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  10. The proximal ascending aorta is dilated measuring 4.40 cm.  11. No prior echo is available for comparison

## 2023-01-18 NOTE — DISCHARGE NOTE NURSING/CASE MANAGEMENT/SOCIAL WORK - NSDCFUADDAPPT_GEN_ALL_CORE_FT
Please attend all follow-up appointments as scheduled.    You will follow-up with Dr. Corbett at his office on [address] [place] [address], [place], [place]

## 2023-01-18 NOTE — DISCHARGE NOTE PROVIDER - NSDCCPTREATMENT_GEN_ALL_CORE_FT
PRINCIPAL PROCEDURE  Procedure: Repair, mitral valve, with CARLY  Findings and Treatment: 36mm medtronic partial ring      SECONDARY PROCEDURE  Procedure: Maze procedure with cardiopulmonary bypass  Findings and Treatment:     Procedure: Occlusion, left atrial appendage  Findings and Treatment: atriclip (atricure)

## 2023-01-18 NOTE — DISCHARGE NOTE PROVIDER - NSDCFUADDAPPT_GEN_ALL_CORE_FT
Please attend all follow-up appointments as scheduled. Please attend all follow-up appointments as scheduled.    You will follow-up with Dr. Corbett at his office on [address] [place] [address], [place], [place]

## 2023-01-19 RX ORDER — RAMIPRIL 2.5 MG/1
2.5 CAPSULE ORAL DAILY
Refills: 0 | Status: COMPLETED | COMMUNITY
Start: 2022-12-08 | End: 2023-01-19

## 2023-01-19 RX ORDER — WARFARIN 2.5 MG/1
2.5 TABLET ORAL DAILY
Refills: 0 | Status: COMPLETED | COMMUNITY
Start: 2022-04-18 | End: 2023-01-18

## 2023-01-19 RX ORDER — SILDENAFIL 100 MG/1
100 TABLET, FILM COATED ORAL
Refills: 0 | Status: COMPLETED | COMMUNITY
Start: 2022-12-08 | End: 2023-01-19

## 2023-01-19 RX ORDER — ACETAMINOPHEN 325 MG/1
325 TABLET, FILM COATED ORAL EVERY 6 HOURS
Refills: 0 | Status: ACTIVE | COMMUNITY
Start: 2023-01-19

## 2023-01-19 RX ORDER — CARVEDILOL 25 MG/1
25 TABLET, FILM COATED ORAL
Qty: 30 | Refills: 0 | Status: COMPLETED | COMMUNITY
Start: 2022-12-08 | End: 2023-01-18

## 2023-01-19 RX ORDER — ENOXAPARIN SODIUM 100 MG/ML
100 INJECTION, SOLUTION SUBCUTANEOUS DAILY
Qty: 5 | Refills: 0 | Status: COMPLETED | COMMUNITY
Start: 2022-12-15 | End: 2023-01-19

## 2023-01-20 ENCOUNTER — APPOINTMENT (OUTPATIENT)
Dept: CARE COORDINATION | Facility: HOME HEALTH | Age: 73
End: 2023-01-20
Payer: MEDICARE

## 2023-01-20 VITALS
WEIGHT: 236 LBS | DIASTOLIC BLOOD PRESSURE: 58 MMHG | RESPIRATION RATE: 16 BRPM | SYSTOLIC BLOOD PRESSURE: 94 MMHG | BODY MASS INDEX: 32.92 KG/M2 | HEART RATE: 78 BPM | OXYGEN SATURATION: 98 %

## 2023-01-20 DIAGNOSIS — Z09 ENCOUNTER FOR FOLLOW-UP EXAMINATION AFTER COMPLETED TREATMENT FOR CONDITIONS OTHER THAN MALIGNANT NEOPLASM: ICD-10-CM

## 2023-01-20 PROCEDURE — 99024 POSTOP FOLLOW-UP VISIT: CPT

## 2023-01-20 NOTE — HISTORY OF PRESENT ILLNESS
[FreeTextEntry1] : 73 YO Male, former smoker (1 ppd, quit 43 years ago) with PMHx of HTN, renal \par calculi, gout, hemochromatosis (managed by Dr.Jeffrey Gomez), A-Fib (on \par Coumadin, last dose 1/3/23), nonischemic cardiomyopathy and severe mitral \par regurgitation who was referred to Dr. Atkins by Dr. Napoleon Corbett for evaluation \par and management of his valvular disease. Preop cardiac cath on 22 revealed \par coronaries with mild LI only and known severe MR. Admitted to CTS under . clotilde Atkins for presurgical testing for planned procedure. On 1/10/23 patient \par underwent MVR, CHERI occlusion, cryomaze ablation with Dr. Atkins and Dr. Larkin. \par Patient arrived to CTICU on , primacor, levo and vaso. Overnight given \par volume, levo and vaso weaned off, given amio for ectopy and found to have \par accelerated junctional rhythm which responded to carotid massage. POD1 volume \par given, extubated to HFNC. POD2 BIPAP & continued HFNC, started on diuretics and \par primacor weaned down. POD3-4 continued ionotropes and diuresis. POD5 weaned \par . POD6 primacor and  off. POD7 BB and amiodarone started, transferred to \[place] [facility]. POD8 Patient had TTE which revealed EF of 25-30% and trace pericardial \par effusion. Final HF recs obtained (start Entresto and Farxiga, and discontinue \par Hydralazine), epicardial wires removed and patient cleared for discharge home \par with Dr. Atkins. \par \par Pt is recovering at home with his wife. Pt is ambulating independently.\par Last BM today.\par [facility] initiated care 23.\par

## 2023-01-20 NOTE — PHYSICAL EXAM
[Sclera] : the sclera and conjunctiva were normal [PERRL With Normal Accommodation] : pupils were equal in size, round, and reactive to light [Neck Appearance] : the appearance of the neck was normal [Jugular Venous Distention Increased] : there was no jugular-venous distention [Respiration, Rhythm And Depth] : normal respiratory rhythm and effort [Exaggerated Use Of Accessory Muscles For Inspiration] : no accessory muscle use [Auscultation Breath Sounds / Voice Sounds] : lungs were clear to auscultation bilaterally [Apical Impulse] : the apical impulse was normal [Heart Rate And Rhythm] : heart rate was normal and rhythm regular [Heart Sounds] : normal S1 and S2 [Heart Sounds Gallop] : no gallops [Murmurs] : no murmurs [Heart Sounds Pericardial Friction Rub] : no pericardial rub [Examination Of The Chest] : the chest was normal in appearance [Chest Visual Inspection Thoracic Asymmetry] : no chest asymmetry [Diminished Respiratory Excursion] : normal chest expansion [2+] : left 2+ [Breast Appearance] : normal in appearance [Bowel Sounds] : normal bowel sounds [Abdomen Soft] : soft [Abdomen Tenderness] : non-tender [Abnormal Walk] : normal gait [Nail Clubbing] : no clubbing  or cyanosis of the fingernails [Involuntary Movements] : no involuntary movements were seen [Musculoskeletal - Swelling] : no joint swelling seen [Skin Color & Pigmentation] : normal skin color and pigmentation [Skin Turgor] : normal skin turgor [] : no rash [FreeTextEntry1] : CT x 2 clean, dry, and intact. Trace B/L LE edema to ankles. [No Focal Deficits] : no focal deficits [Oriented To Time, Place, And Person] : oriented to person, place, and time [Impaired Insight] : insight and judgment were intact [Affect] : the affect was normal [Mood] : the mood was normal [Memory Recent] : recent memory was not impaired [Memory Remote] : remote memory was not impaired

## 2023-01-20 NOTE — COUNSELING
[Hygeine (Including Daily Shower)] : hygeine (including daily shower) [No Heavy Lifting] : no heavy lifting (>15-20 lb. for 1 month or 25 lb. for 3 months from date of surgery) [Blood Pressure Control] : blood pressure control [S/S of infection] : signs and symptoms of infection (and to whom it should be reported) [Medication/Vitamin/Herb/Food Interaction] : medication/vitamin/herb/food interaction

## 2023-01-22 ENCOUNTER — RESULT REVIEW (OUTPATIENT)
Age: 73
End: 2023-01-22

## 2023-01-23 ENCOUNTER — APPOINTMENT (OUTPATIENT)
Dept: CARDIOTHORACIC SURGERY | Facility: CLINIC | Age: 73
End: 2023-01-23
Payer: MEDICARE

## 2023-01-23 DIAGNOSIS — Z98.890 OTHER SPECIFIED POSTPROCEDURAL STATES: ICD-10-CM

## 2023-01-23 DIAGNOSIS — Z86.79 OTHER SPECIFIED POSTPROCEDURAL STATES: ICD-10-CM

## 2023-01-23 DIAGNOSIS — Z01.818 ENCOUNTER FOR OTHER PREPROCEDURAL EXAMINATION: ICD-10-CM

## 2023-01-23 LAB — SURGICAL PATHOLOGY STUDY: SIGNIFICANT CHANGE UP

## 2023-01-23 PROCEDURE — 99024 POSTOP FOLLOW-UP VISIT: CPT

## 2023-01-24 ENCOUNTER — APPOINTMENT (OUTPATIENT)
Dept: HEART AND VASCULAR | Facility: CLINIC | Age: 73
End: 2023-01-24
Payer: MEDICARE

## 2023-01-24 ENCOUNTER — NON-APPOINTMENT (OUTPATIENT)
Age: 73
End: 2023-01-24

## 2023-01-24 VITALS
WEIGHT: 233 LBS | HEART RATE: 83 BPM | OXYGEN SATURATION: 98 % | SYSTOLIC BLOOD PRESSURE: 105 MMHG | TEMPERATURE: 98.7 F | DIASTOLIC BLOOD PRESSURE: 60 MMHG | BODY MASS INDEX: 32.62 KG/M2 | HEIGHT: 71 IN

## 2023-01-24 PROCEDURE — 93000 ELECTROCARDIOGRAM COMPLETE: CPT

## 2023-01-24 PROCEDURE — 99203 OFFICE O/P NEW LOW 30 MIN: CPT | Mod: 25

## 2023-01-25 LAB
CORTICOSTEROID BINDING GLOBULIN RESULT: 0.9 MG/DL — LOW
CORTIS F/TOTAL MFR SERPL: 75 % — SIGNIFICANT CHANGE UP
CORTIS SERPL-MCNC: 24 UG/DL — HIGH
CORTISOL, FREE RESULT: 18 UG/DL — HIGH

## 2023-01-30 ENCOUNTER — APPOINTMENT (OUTPATIENT)
Dept: HEART AND VASCULAR | Facility: CLINIC | Age: 73
End: 2023-01-30

## 2023-01-30 NOTE — ASSESSMENT
[FreeTextEntry1] : 72 year old male s/p MVR, CHERI occlusion, cryomaze ablation on 1/10/23 presents for a postop visit via telehealth \par \par Continue current medication regimen.\par Continue to increase activity and walk daily as tolerated. Continue to use incentive spirometer. \par No driving or strenuous activity for 4 weeks after surgery. Avoid lifting >10 to 15 lbs.\par Call MD if you experience fever, fatigue, dizziness, confusion, syncope, shortness of breath, chest pain not relieved with analgesics, increased redness/drainage from incision.\par Continue to use compression stockings. Keep legs elevated above heart when resting/sitting/sleeping\par Follow up with Dr. Corbett \par Follow up in CTS clinic on 2/10 \par Patient will have chest xray today \par Plan for echocardiogram in 3 months\par

## 2023-01-30 NOTE — REASON FOR VISIT
[Home] : at home, [unfilled] , at the time of the visit. [Medical Office: ([facility])___] : at the medical office located in  [Patient] : the patient [Self] : self [Family Member] : family member [de-identified] : MVR, CHERI occlusion, cryomaze ablation  [de-identified] : 1/10/23 [de-identified] : Patient arrived to CTICU on , primacor, levo and vaso. Overnight given volume, levo and vaso weaned off, given amio for ectopy and found to have accelerated junctional rhythm which responded to carotid massage. POD1 volume given, extubated to HFNC. POD2 BIPAP & continued HFNC, started on diuretics and \par primacor weaned down. POD3-4 continued ionotropes and diuresis. POD5 weaned . POD6 primacor and  off. POD7 BB and amiodarone started, transferred to [facility]. POD8 Patient had TTE which revealed EF of 25-30% and trace pericardial effusion. Final HF recs obtained (start Entresto and Farxiga, and discontinue  Hydralazine), epicardial wires removed and patient cleared for discharge home on 22. \par \par He presents today for a postop visit via telehealth. He appears generally well, denies c/o chest pain, sob/schroeder, fever, lower extremity edema or palpitations.\par  \par \par \par

## 2023-01-30 NOTE — CONSULT LETTER
[Dear  ___] : Dear  [unfilled], [Please see my note below.] : Please see my note below. [Sincerely,] : Sincerely, [FreeTextEntry2] : Dr. Napoleon Corbett  [FreeTextEntry1] : RAY YI  was seen today for a post operative visit. He is doing well status post MVR, CHERI occlusion, cryomaze ablation on 1/10/23.  We have asked that the patient followup in your office. We have instructed the patient to return to see us in 2 weeks. Enclosed is a copy of the operative report. Please contact our office for any questions or concerns at [phone].\par \par Thank you for allowing us to participate in this patients care.\par \par   [FreeTextEntry3] : Bryson Atkins MD, FET\par Attending Surgeon\par Cardiovascular & Thoracic Surgery\par [facility]\par \par [employer] [facility] [employer]\par

## 2023-01-30 NOTE — END OF VISIT
[Time Spent: ___ minutes] : I have spent [unfilled] minutes of time on the encounter. [FreeTextEntry3] : I, TENNILLE ROBLERO , am scribing for and in the presence of CHUCK GONZALEZ the following sections: History of present illness, past Medical/family/surgical/family/social history, review of systems, vital signs, physical exam and disposition.\par

## 2023-02-01 ENCOUNTER — NON-APPOINTMENT (OUTPATIENT)
Age: 73
End: 2023-02-01

## 2023-02-01 DIAGNOSIS — I44.0 ATRIOVENTRICULAR BLOCK, FIRST DEGREE: ICD-10-CM

## 2023-02-01 DIAGNOSIS — I11.0 HYPERTENSIVE HEART DISEASE WITH HEART FAILURE: ICD-10-CM

## 2023-02-01 DIAGNOSIS — I47.1 SUPRAVENTRICULAR TACHYCARDIA: ICD-10-CM

## 2023-02-01 DIAGNOSIS — E83.119 HEMOCHROMATOSIS, UNSPECIFIED: ICD-10-CM

## 2023-02-01 DIAGNOSIS — I42.8 OTHER CARDIOMYOPATHIES: ICD-10-CM

## 2023-02-01 DIAGNOSIS — E87.20 ACIDOSIS, UNSPECIFIED: ICD-10-CM

## 2023-02-01 DIAGNOSIS — R57.0 CARDIOGENIC SHOCK: ICD-10-CM

## 2023-02-01 DIAGNOSIS — Z79.01 LONG TERM (CURRENT) USE OF ANTICOAGULANTS: ICD-10-CM

## 2023-02-01 DIAGNOSIS — I48.91 UNSPECIFIED ATRIAL FIBRILLATION: ICD-10-CM

## 2023-02-01 DIAGNOSIS — J96.00 ACUTE RESPIRATORY FAILURE, UNSPECIFIED WHETHER WITH HYPOXIA OR HYPERCAPNIA: ICD-10-CM

## 2023-02-01 DIAGNOSIS — Z87.891 PERSONAL HISTORY OF NICOTINE DEPENDENCE: ICD-10-CM

## 2023-02-01 DIAGNOSIS — I34.0 NONRHEUMATIC MITRAL (VALVE) INSUFFICIENCY: ICD-10-CM

## 2023-02-01 DIAGNOSIS — I50.42 CHRONIC COMBINED SYSTOLIC (CONGESTIVE) AND DIASTOLIC (CONGESTIVE) HEART FAILURE: ICD-10-CM

## 2023-02-01 DIAGNOSIS — D64.89 OTHER SPECIFIED ANEMIAS: ICD-10-CM

## 2023-02-01 DIAGNOSIS — M10.9 GOUT, UNSPECIFIED: ICD-10-CM

## 2023-02-01 DIAGNOSIS — R73.9 HYPERGLYCEMIA, UNSPECIFIED: ICD-10-CM

## 2023-02-02 ENCOUNTER — APPOINTMENT (OUTPATIENT)
Dept: HEART AND VASCULAR | Facility: CLINIC | Age: 73
End: 2023-02-02

## 2023-02-07 NOTE — REASON FOR VISIT
[de-identified] : MVR, CHERI occlusion, cryomaze ablation  [de-identified] : 1/10/23 [de-identified] : Patient arrived to CTICU on , primacor, levo and vaso. Overnight given volume, levo and vaso weaned off, given amio for ectopy and found to have accelerated junctional rhythm which responded to carotid massage. POD1 volume given, extubated to HFNC. POD2 BIPAP & continued HFNC, started on diuretics and \par primacor weaned down. POD3-4 continued ionotropes and diuresis. POD5 weaned . POD6 primacor and  off. POD7 BB and amiodarone started, transferred to [facility]. POD8 Patient had TTE which revealed EF of 25-30% and trace pericardial effusion. Final HF recs obtained (start Entresto and Farxiga, and discontinue  Hydralazine), epicardial wires removed and patient cleared for discharge home on 22. \par \par He presents today for a postop visit

## 2023-02-07 NOTE — ASSESSMENT
[FreeTextEntry1] : 72 year old male s/p MVR, CHERI occlusion, cryomaze ablation on 1/10/23 presents for a postop visit \par \par \par 1. Continue current medication regimen.\par 2. Continue to increase activity and walk daily as tolerated. \par 3. Avoid lifting >25lbs for 3 months after surgery. \par 4Plan for echocardiogram in 3 months\par

## 2023-02-10 ENCOUNTER — APPOINTMENT (OUTPATIENT)
Dept: CARDIOTHORACIC SURGERY | Facility: CLINIC | Age: 73
End: 2023-02-10

## 2023-02-16 ENCOUNTER — LABORATORY RESULT (OUTPATIENT)
Age: 73
End: 2023-02-16

## 2023-02-16 ENCOUNTER — APPOINTMENT (OUTPATIENT)
Dept: HEART AND VASCULAR | Facility: CLINIC | Age: 73
End: 2023-02-16
Payer: MEDICARE

## 2023-02-16 VITALS
WEIGHT: 236 LBS | SYSTOLIC BLOOD PRESSURE: 98 MMHG | HEIGHT: 71 IN | TEMPERATURE: 98.4 F | OXYGEN SATURATION: 97 % | DIASTOLIC BLOOD PRESSURE: 68 MMHG | BODY MASS INDEX: 33.04 KG/M2 | HEART RATE: 78 BPM

## 2023-02-16 DIAGNOSIS — I50.20 UNSPECIFIED SYSTOLIC (CONGESTIVE) HEART FAILURE: ICD-10-CM

## 2023-02-16 PROCEDURE — 99215 OFFICE O/P EST HI 40 MIN: CPT

## 2023-02-16 RX ORDER — SACUBITRIL AND VALSARTAN 24; 26 MG/1; MG/1
24-26 TABLET, FILM COATED ORAL
Qty: 30 | Refills: 0 | Status: ACTIVE | COMMUNITY
Start: 2023-01-19 | End: 1900-01-01

## 2023-02-16 RX ORDER — DAPAGLIFLOZIN 10 MG/1
10 TABLET, FILM COATED ORAL DAILY
Qty: 30 | Refills: 0 | Status: ACTIVE | COMMUNITY
Start: 2023-01-19 | End: 1900-01-01

## 2023-02-16 RX ORDER — APIXABAN 5 MG/1
5 TABLET, FILM COATED ORAL
Qty: 60 | Refills: 0 | Status: ACTIVE | COMMUNITY
Start: 2023-01-19 | End: 1900-01-01

## 2023-02-16 RX ORDER — ALLOPURINOL 100 MG/1
100 TABLET ORAL
Qty: 30 | Refills: 0 | Status: ACTIVE | COMMUNITY
Start: 2022-12-08 | End: 1900-01-01

## 2023-02-16 RX ORDER — CARVEDILOL 3.12 MG/1
3.12 TABLET, FILM COATED ORAL TWICE DAILY
Qty: 60 | Refills: 0 | Status: ACTIVE | COMMUNITY
Start: 2023-01-19 | End: 1900-01-01

## 2023-02-16 NOTE — HISTORY OF PRESENT ILLNESS
[FreeTextEntry1] : 72/M former smoker, Hx of hemochromatosis, HTN, AFib (on coumadin), NICM with severe MR s/p MV repair, CHERI occlusion, cryomaze ablation at [facility] on 1/10/23, with post of noted to have new reduction in LVEF from 55%--> 30% and was seen by HF service and his medication optimized post op. presents today for follow up. \par \par Cardiac Studies:\par LHC (1/9/23): No significant coronary disease\par CARLY (11/14/22): LVEF 50-55%, severely dilated LA, severe MR prolapse of posterior leaflet w/ PV systolic flow reversal, mitral regurgitation jet is directed toward septum, no LA/CHERI thrombus\par TTE 1/18: Severely reduced LV fx 25-30%, reduced RV function, biatrial enlargement, mitral anuloplasty ring with 5mmHg gradient, mod AR, dilated aortic root. \par \par Doing well since DC. no SOB, leg swelling, Orthopnea or PND. walks 30mins on lvel ground w/o SOB. climbs 1 flight of stairs. \par tolerating all meds since d/c. \par

## 2023-02-16 NOTE — ASSESSMENT
[FreeTextEntry1] : 72/M former smoker, Hx of hemochromatosis, HTN, AFib (on coumadin), NICM with severe MR s/p MV repair, CHERI occlusion, cryomaze ablation at [facility] on 1/10/23, with post of noted to have new reduction in LVEF from 55%--> 30% and was seen by HF service and his medication optimized post op. presents today for follow up. \par \par Cardiac Studies:\par LHC (1/9/23): No significant coronary disease\par CARLY (11/14/22): LVEF 50-55%, severely dilated LA, severe MR prolapse of posterior leaflet w/ PV systolic flow reversal, mitral regurgitation jet is directed toward septum, no LA/CHERI thrombus\par TTE 1/18: Severely reduced LV fx 25-30%, reduced RV function, biatrial enlargement, mitral anuloplasty ring with 5mmHg gradient, mod AR, dilated aortic root. \par \par \par HFrEF, Stage C, NYHA class 2-3\par NICM\par euvolemic , cont lasix 40mg \par cont entresto 24-26mg BID and coreg 3.125mg BID\par on fraxiga 10mg po daily\par Will add son based on BMP\par follow up in 4-6 weeks\par planned for ECHO in March 2023 with Dr. Corbett\par no device\par EKG: sinus narrow qrs 108msec\par \par

## 2023-02-16 NOTE — PHYSICAL EXAM
[Well Developed] : well developed [No Acute Distress] : no acute distress [Normal Venous Pressure] : normal venous pressure [Normal S1, S2] : normal S1, S2 [No Murmur] : no murmur [No Gallop] : no gallop [Clear Lung Fields] : clear lung fields [Soft] : abdomen soft [Non Tender] : non-tender [Normal Bowel Sounds] : normal bowel sounds [No Edema] : no edema [Moves all extremities] : moves all extremities [Alert and Oriented] : alert and oriented

## 2023-02-17 ENCOUNTER — TRANSCRIPTION ENCOUNTER (OUTPATIENT)
Age: 73
End: 2023-02-17

## 2023-02-17 LAB — NT-PROBNP SERPL-MCNC: 778 PG/ML

## 2023-02-17 RX ORDER — SPIRONOLACTONE 25 MG/1
25 TABLET ORAL DAILY
Qty: 90 | Refills: 3 | Status: ACTIVE | COMMUNITY
Start: 2023-02-17 | End: 1900-01-01

## 2023-03-15 ENCOUNTER — NON-APPOINTMENT (OUTPATIENT)
Age: 73
End: 2023-03-15

## 2023-03-15 NOTE — HISTORY OF PRESENT ILLNESS
[FreeTextEntry1] : 72-year-old male with history of hemochromatosis, hypertension, atrial fibrillation, nonischemic cardiomyopathy, severe MR who underwent mitral valve repair, left atrial appendage occlusion, and cryo maze on 1/10/2023 at [facility].  His EF was noted to be markedly reduced postoperatively.\par Echo 1/18/2023 showed an EF of 25 to 30% with reduced RV function as well.  Echo November 2022 showed an EF of 50 to 55%.\par Postoperatively he was noted to have some accelerated junctional rhythm.  He was treated with amiodarone, beta-blocker and Eliquis.  He is on medications for reduced LV function as well.\par He is seen today with his wife.  He reports that he is slowly getting more energy and has no complaints.  He denies chest pain, palpitations, dizziness, or syncope.\par EKG 1/24/2023 shows sinus rhythm at 76 bpm.\par Cardiac cath prior to surgery showed mild coronary disease.

## 2023-03-15 NOTE — REASON FOR VISIT
[Arrhythmia/ECG Abnorrmalities] : arrhythmia/ECG abnormalities [FreeTextEntry3] : Dr. Atkins, Dr. Beatty

## 2023-03-15 NOTE — PHYSICAL EXAM
[Well Developed] : well developed [No Acute Distress] : no acute distress [No Carotid Bruit] : no carotid bruit [Normal S1, S2] : normal S1, S2 [No Murmur] : no murmur [Clear Lung Fields] : clear lung fields [Good Air Entry] : good air entry [Soft] : abdomen soft [No Respiratory Distress] : no respiratory distress  [Non Tender] : non-tender [Normal Gait] : normal gait [No Edema] : no edema [No Cyanosis] : no cyanosis [No Clubbing] : no clubbing [No Rash] : no rash [Moves all extremities] : moves all extremities [No Focal Deficits] : no focal deficits [Normal Speech] : normal speech [Alert and Oriented] : alert and oriented

## 2023-03-15 NOTE — REVIEW OF SYSTEMS
[Fever] : no fever [Chills] : no chills [Chest Discomfort] : no chest discomfort [Palpitations] : no palpitations [Orthopnea] : no orthopnea [Syncope] : no syncope [Cough] : no cough [Abdominal Pain] : no abdominal pain [Nausea] : no nausea [Joint Pain] : no joint pain [Dizziness] : no dizziness

## 2023-03-17 ENCOUNTER — APPOINTMENT (OUTPATIENT)
Dept: CARDIOTHORACIC SURGERY | Facility: CLINIC | Age: 73
End: 2023-03-17
Payer: MEDICARE

## 2023-03-17 VITALS
DIASTOLIC BLOOD PRESSURE: 63 MMHG | OXYGEN SATURATION: 98 % | WEIGHT: 242 LBS | SYSTOLIC BLOOD PRESSURE: 100 MMHG | HEART RATE: 72 BPM | BODY MASS INDEX: 33.88 KG/M2 | HEIGHT: 71 IN

## 2023-03-17 PROCEDURE — 99213 OFFICE O/P EST LOW 20 MIN: CPT

## 2023-03-23 ENCOUNTER — APPOINTMENT (OUTPATIENT)
Dept: HEART AND VASCULAR | Facility: CLINIC | Age: 73
End: 2023-03-23
Payer: MEDICARE

## 2023-03-23 VITALS
HEART RATE: 77 BPM | OXYGEN SATURATION: 97 % | RESPIRATION RATE: 16 BRPM | SYSTOLIC BLOOD PRESSURE: 100 MMHG | WEIGHT: 240 LBS | DIASTOLIC BLOOD PRESSURE: 70 MMHG | BODY MASS INDEX: 33.6 KG/M2 | HEIGHT: 71 IN

## 2023-03-23 PROCEDURE — 99214 OFFICE O/P EST MOD 30 MIN: CPT

## 2023-03-23 RX ORDER — FUROSEMIDE 40 MG/1
40 TABLET ORAL
Qty: 30 | Refills: 0 | Status: DISCONTINUED | COMMUNITY
Start: 2022-12-08 | End: 2023-03-23

## 2023-03-23 NOTE — ASSESSMENT
[FreeTextEntry1] : 72/M former smoker, Hx of hemochromatosis, HTN, AFib (on coumadin), NICM with severe MR s/p MV repair, CHERI occlusion, cryomaze ablation at [facility] on 1/10/23, with post of noted to have new reduction in LVEF from 55%--> 30% and was seen by HF service and his medication optimized post op. presents today for follow up. \par \par Cardiac Studies:\par LHC (1/9/23): No significant coronary disease\par CARLY (11/14/22): LVEF 50-55%, severely dilated LA, severe MR prolapse of posterior leaflet w/ PV systolic flow reversal, mitral regurgitation jet is directed toward septum, no LA/CHERI thrombus\par TTE 1/18: Severely reduced LV fx 25-30%, reduced RV function, biatrial enlargement, mitral anuloplasty ring with 5mmHg gradient, mod AR, dilated aortic root. \par \par TTE: 3/2023: EF 50%, LVEDD 4.2cms, severe LA dilation, RV mild reduced function, s/p MV repair, trace MR, Mild-mod TR, RVSP 34mmHG\par \par HF with improved EF (35%--> 50%), Stage C, NYHA class 2\par NICM\par euvolemic , cont lasix 40mg PRN\par cont entresto 24-26mg BID and coreg 3.125mg BID\par on fraxiga 10mg po daily\par will cont Annabella 25mg po daily\par follow up 3-4months\par ECHO as above with improved EF 50%\par no device\par EKG 1/2023: sinus narrow qrs 108msec\par \par

## 2023-03-23 NOTE — HISTORY OF PRESENT ILLNESS
[FreeTextEntry1] : 72/M former smoker, Hx of hemochromatosis, HTN, AFib (on coumadin), NICM with severe MR s/p MV repair, CHERI occlusion, cryomaze ablation at [facility] on 1/10/23, with post of noted to have new reduction in LVEF from 55%--> 30% and was seen by HF service and his medication optimized post op. presents today for follow up. \par \par Cardiac Studies:\par LHC (1/9/23): No significant coronary disease\par CARLY (11/14/22): LVEF 50-55%, severely dilated LA, severe MR prolapse of posterior leaflet w/ PV systolic flow reversal, mitral regurgitation jet is directed toward septum, no LA/CHERI thrombus\par TTE 1/18: Severely reduced LV fx 25-30%, reduced RV function, biatrial enlargement, mitral anuloplasty ring with 5mmHg gradient, mod AR, dilated aortic root. \par \par Doing well since last visit.  no SOB, leg swelling, Orthopnea or PND after m,aking lasix PRN. walks 1hr ~3miles  on level ground w/o SOB. climbs 1 flight of stairs. \par tolerating all meds w/o palpitatopn, dizziness. No syncope. \par

## 2023-03-27 LAB
ALBUMIN SERPL ELPH-MCNC: 4.4 G/DL
ALP BLD-CCNC: 81 U/L
ALT SERPL-CCNC: 13 U/L
ANION GAP SERPL CALC-SCNC: 21 MMOL/L
AST SERPL-CCNC: 22 U/L
BILIRUB SERPL-MCNC: 0.3 MG/DL
BUN SERPL-MCNC: 32 MG/DL
CALCIUM SERPL-MCNC: 9.8 MG/DL
CHLORIDE SERPL-SCNC: 103 MMOL/L
CO2 SERPL-SCNC: 18 MMOL/L
CREAT SERPL-MCNC: 1.28 MG/DL
EGFR: 59 ML/MIN/1.73M2
GLUCOSE SERPL-MCNC: 101 MG/DL
NT-PROBNP SERPL-MCNC: 301 PG/ML
POTASSIUM SERPL-SCNC: 5.2 MMOL/L
PROT SERPL-MCNC: 7.6 G/DL
SODIUM SERPL-SCNC: 141 MMOL/L

## 2023-03-27 RX ORDER — POTASSIUM CHLORIDE 1500 MG/1
20 TABLET, FILM COATED, EXTENDED RELEASE ORAL
Qty: 30 | Refills: 0 | Status: DISCONTINUED | COMMUNITY
Start: 2023-01-19 | End: 2023-03-27

## 2023-03-28 ENCOUNTER — APPOINTMENT (OUTPATIENT)
Dept: HEART AND VASCULAR | Facility: CLINIC | Age: 73
End: 2023-03-28
Payer: MEDICARE

## 2023-03-28 VITALS
TEMPERATURE: 98.7 F | HEIGHT: 71 IN | DIASTOLIC BLOOD PRESSURE: 70 MMHG | BODY MASS INDEX: 33.6 KG/M2 | WEIGHT: 240 LBS | SYSTOLIC BLOOD PRESSURE: 100 MMHG | HEART RATE: 74 BPM

## 2023-03-28 PROCEDURE — 99214 OFFICE O/P EST MOD 30 MIN: CPT

## 2023-03-29 NOTE — PHYSICAL EXAM
[Well Developed] : well developed [No Acute Distress] : no acute distress [No Carotid Bruit] : no carotid bruit [Normal S1, S2] : normal S1, S2 [No Murmur] : no murmur [Clear Lung Fields] : clear lung fields [Good Air Entry] : good air entry [No Respiratory Distress] : no respiratory distress  [Soft] : abdomen soft [Non Tender] : non-tender [Normal Gait] : normal gait [No Edema] : no edema [No Cyanosis] : no cyanosis [No Clubbing] : no clubbing [No Rash] : no rash [Moves all extremities] : moves all extremities [No Focal Deficits] : no focal deficits [Normal Speech] : normal speech [Alert and Oriented] : alert and oriented

## 2023-03-29 NOTE — HISTORY OF PRESENT ILLNESS
[FreeTextEntry1] : 73-year-old male with history of hemochromatosis, hypertension, atrial fibrillation, nonischemic cardiomyopathy, severe MR who underwent mitral valve repair, left atrial appendage occlusion, and cryo maze on 1/10/2023 at [facility].  His EF was noted to be markedly reduced postoperatively.\par Echo 1/18/2023 showed an EF of 25 to 30% with reduced RV function as well.  Echo November 2022 showed an EF of 50 to 55%.\par Postoperatively he was noted to have some accelerated junctional rhythm.  He was treated with amiodarone, beta-blocker and Eliquis.\par EKG 1/24/2023 shows sinus rhythm at 76 bpm.\par Cardiac cath prior to surgery showed mild coronary disease.\par More recent echo 3/8/2023 showed EF 50%, dilated LA, reduced RV function, mild AI, trace MR, mild-mod TR.\par He feels good and has no complaints.  He denies palpitations, dizziness, or syncope.  He is walking regularly and denies significant exertional dyspnea.  He remains on amiodarone.  He saw Dr. Beatty recently.

## 2023-04-08 NOTE — PHYSICAL EXAM
[Sclera] : the sclera and conjunctiva were normal [PERRL With Normal Accommodation] : pupils were equal in size, round, and reactive to light [Extraocular Movements] : extraocular movements were intact [Neck Cervical Mass (___cm)] : no neck mass was observed [Neck Appearance] : the appearance of the neck was normal [Jugular Venous Distention Increased] : there was no jugular-venous distention [Thyroid Diffuse Enlargement] : the thyroid was not enlarged [Thyroid Nodule] : there were no palpable thyroid nodules [Auscultation Breath Sounds / Voice Sounds] : lungs were clear to auscultation bilaterally [Heart Rate And Rhythm] : heart rate was normal and rhythm regular [Heart Sounds] : normal S1 and S2 [Heart Sounds Gallop] : no gallops [Murmurs] : no murmurs [Heart Sounds Pericardial Friction Rub] : no pericardial rub [Examination Of The Chest] : the chest was normal in appearance [Chest Visual Inspection Thoracic Asymmetry] : no chest asymmetry [Diminished Respiratory Excursion] : normal chest expansion [FreeTextEntry1] : sternotomy healing c/d/i [Right Carotid Bruit] : no bruit heard over the right carotid [Left Carotid Bruit] : no bruit heard over the left carotid [Right Femoral Bruit] : no bruit heard over the right femoral artery [Left Femoral Bruit] : no bruit heard over the left femoral artery [2+] : left 2+ [No Abnormalities] : the abdominal aorta was not enlarged and no bruit was heard [Bowel Sounds] : normal bowel sounds [Abdomen Soft] : soft [Abdomen Tenderness] : non-tender [Abdomen Mass (___ Cm)] : no abdominal mass palpated [Abnormal Walk] : normal gait [Nail Clubbing] : no clubbing  or cyanosis of the fingernails [Musculoskeletal - Swelling] : no joint swelling seen [Motor Tone] : muscle strength and tone were normal [Skin Color & Pigmentation] : normal skin color and pigmentation [Skin Turgor] : normal skin turgor [] : no rash [Deep Tendon Reflexes (DTR)] : deep tendon reflexes were 2+ and symmetric [Sensation] : the sensory exam was normal to light touch and pinprick [No Focal Deficits] : no focal deficits [Oriented To Time, Place, And Person] : oriented to person, place, and time [Impaired Insight] : insight and judgment were intact [Affect] : the affect was normal

## 2023-04-08 NOTE — HISTORY OF PRESENT ILLNESS
[FreeTextEntry1] : 72yo male, former smoker ( 25 years 1 ppd, quit 43 years ago) with a past medical history of hypertension, renal calculi, gout, hemochromatosis (managed by Dr.Jeffrey Gomez), atrial fibrillation (on coumadin), nonischemic cardiomyopathy and severe mitral regurgitation s/p MVRe, CHERI closure, cryomaze ablation 1/10/23. Discharged on POD#8 on BB/amiodarone, in addition to entresto/farxiga per CHF consult. Perioperatively was noted to have accelerated junctional rhythma. TTE which revealed EF of 25-30% and trace pericardial effusion. He presents today for a postop visit. Patient feels much improved without shorntess of breath, josefina chest pain, pnd, orthopnea, PRADEEP. \par \par TTE 3/8/23 showed mildly reduced RVEF, LVEF 50%, paradoxical septal motion, mild AI, s/p MVRe with trace MR, mild-mod TR. PASP 34mmHg.

## 2023-04-08 NOTE — ASSESSMENT
[FreeTextEntry1] : 72yo male, former smoker ( 25 years 1 ppd, quit 43 years ago) with a past medical history of hypertension, renal calculi, gout, hemochromatosis (managed by Dr.Jeffrey Gomez), atrial fibrillation, nonischemic cardiomyopathy and severe mitral regurgitation s/p MVRe, CHERI closure, cryomaze ablation 1/10/23. Discharged on POD#8 on BB/amiodarone, in addition to entresto/farxiga per CHF consult. TTE which revealed EF of 25-30% and trace pericardial effusion. NYHA I. TTE 3/8/23 showed mildly reduced RVEF, LVEF 50%, paradoxical septal motion, mild AI, s/p MVRe with trace MR, mild-mod TR. PASP 34mmHg. \par \par -continue GDMT including SGLT2i, neprlysin inh/ARB\par -continue amiodarte/bb per EP, f/u\par -appreciate CHF input\par -on NOAC\par -f/u with Dr. Atkins in 1mo\par -continue care per Dr. Corbett, referring cardiologist\par -cardiac rehab referral\par \par I personally performed the services described in the documentation and reviewed the documented recorded by the scribe in my presence; it accurately and completely records my words and actions.\par \par

## 2023-07-20 ENCOUNTER — APPOINTMENT (OUTPATIENT)
Dept: HEART AND VASCULAR | Facility: CLINIC | Age: 73
End: 2023-07-20

## 2023-09-05 ENCOUNTER — APPOINTMENT (OUTPATIENT)
Dept: HEART AND VASCULAR | Facility: CLINIC | Age: 73
End: 2023-09-05

## 2023-11-07 ENCOUNTER — APPOINTMENT (OUTPATIENT)
Dept: HEART AND VASCULAR | Facility: CLINIC | Age: 73
End: 2023-11-07
Payer: MEDICARE

## 2023-11-07 VITALS
DIASTOLIC BLOOD PRESSURE: 68 MMHG | TEMPERATURE: 98.4 F | BODY MASS INDEX: 33.6 KG/M2 | HEIGHT: 71 IN | SYSTOLIC BLOOD PRESSURE: 106 MMHG | WEIGHT: 240 LBS | HEART RATE: 74 BPM | OXYGEN SATURATION: 97 %

## 2023-11-07 PROCEDURE — 99214 OFFICE O/P EST MOD 30 MIN: CPT

## 2023-12-04 RX ORDER — AMIODARONE HYDROCHLORIDE 200 MG/1
200 TABLET ORAL DAILY
Qty: 30 | Refills: 0 | Status: DISCONTINUED | COMMUNITY
Start: 2023-01-19 | End: 2023-12-04

## 2024-05-07 ENCOUNTER — APPOINTMENT (OUTPATIENT)
Dept: HEART AND VASCULAR | Facility: CLINIC | Age: 74
End: 2024-05-07
Payer: MEDICARE

## 2024-05-07 ENCOUNTER — NON-APPOINTMENT (OUTPATIENT)
Age: 74
End: 2024-05-07

## 2024-05-07 VITALS
WEIGHT: 231 LBS | HEART RATE: 80 BPM | SYSTOLIC BLOOD PRESSURE: 110 MMHG | DIASTOLIC BLOOD PRESSURE: 60 MMHG | TEMPERATURE: 98.7 F | HEIGHT: 71 IN | BODY MASS INDEX: 32.34 KG/M2 | OXYGEN SATURATION: 97 %

## 2024-05-07 DIAGNOSIS — I48.20 CHRONIC ATRIAL FIBRILLATION, UNSP: ICD-10-CM

## 2024-05-07 DIAGNOSIS — I42.9 CARDIOMYOPATHY, UNSPECIFIED: ICD-10-CM

## 2024-05-07 PROCEDURE — 99213 OFFICE O/P EST LOW 20 MIN: CPT | Mod: 25

## 2024-05-07 PROCEDURE — 93000 ELECTROCARDIOGRAM COMPLETE: CPT

## 2024-05-22 PROBLEM — I42.9 CARDIOMYOPATHY: Status: ACTIVE | Noted: 2022-12-08

## 2024-05-22 PROBLEM — I48.20 CHRONIC ATRIAL FIBRILLATION: Status: ACTIVE | Noted: 2022-12-08
